# Patient Record
Sex: MALE | Race: WHITE | NOT HISPANIC OR LATINO | Employment: OTHER | URBAN - METROPOLITAN AREA
[De-identification: names, ages, dates, MRNs, and addresses within clinical notes are randomized per-mention and may not be internally consistent; named-entity substitution may affect disease eponyms.]

---

## 2018-01-03 ENCOUNTER — OFFICE VISIT CONVERTED (OUTPATIENT)
Dept: SURGERY | Facility: CLINIC | Age: 63
End: 2018-01-03
Attending: SURGERY

## 2018-12-12 ENCOUNTER — OFFICE VISIT CONVERTED (OUTPATIENT)
Dept: CARDIOLOGY | Facility: CLINIC | Age: 63
End: 2018-12-12
Attending: INTERNAL MEDICINE

## 2018-12-12 ENCOUNTER — CONVERSION ENCOUNTER (OUTPATIENT)
Dept: CARDIOLOGY | Facility: CLINIC | Age: 63
End: 2018-12-12

## 2018-12-17 ENCOUNTER — CONVERSION ENCOUNTER (OUTPATIENT)
Dept: CARDIOLOGY | Facility: CLINIC | Age: 63
End: 2018-12-17
Attending: INTERNAL MEDICINE

## 2018-12-24 RX ORDER — FUROSEMIDE 20 MG/1
20 TABLET ORAL EVERY OTHER DAY
COMMUNITY
End: 2019-01-02 | Stop reason: HOSPADM

## 2018-12-24 RX ORDER — ATORVASTATIN CALCIUM 40 MG/1
40 TABLET, FILM COATED ORAL DAILY
COMMUNITY
End: 2021-12-08 | Stop reason: SDUPTHER

## 2018-12-26 ENCOUNTER — PREP FOR SURGERY (OUTPATIENT)
Dept: OTHER | Facility: HOSPITAL | Age: 63
End: 2018-12-26

## 2018-12-26 ENCOUNTER — TELEPHONE (OUTPATIENT)
Dept: CARDIAC SURGERY | Facility: CLINIC | Age: 63
End: 2018-12-26

## 2018-12-26 ENCOUNTER — OFFICE VISIT (OUTPATIENT)
Dept: CARDIAC SURGERY | Facility: CLINIC | Age: 63
End: 2018-12-26

## 2018-12-26 ENCOUNTER — APPOINTMENT (OUTPATIENT)
Dept: CARDIOLOGY | Facility: HOSPITAL | Age: 63
End: 2018-12-26

## 2018-12-26 ENCOUNTER — HOSPITAL ENCOUNTER (INPATIENT)
Facility: HOSPITAL | Age: 63
LOS: 7 days | Discharge: HOME HEALTH | End: 2019-01-02
Attending: THORACIC SURGERY (CARDIOTHORACIC VASCULAR SURGERY) | Admitting: THORACIC SURGERY (CARDIOTHORACIC VASCULAR SURGERY)

## 2018-12-26 ENCOUNTER — APPOINTMENT (OUTPATIENT)
Dept: GENERAL RADIOLOGY | Facility: HOSPITAL | Age: 63
End: 2018-12-26

## 2018-12-26 ENCOUNTER — HOSPITAL ENCOUNTER (OUTPATIENT)
Facility: HOSPITAL | Age: 63
Setting detail: SURGERY ADMIT
End: 2018-12-26
Attending: THORACIC SURGERY (CARDIOTHORACIC VASCULAR SURGERY) | Admitting: THORACIC SURGERY (CARDIOTHORACIC VASCULAR SURGERY)

## 2018-12-26 VITALS
BODY MASS INDEX: 28.1 KG/M2 | DIASTOLIC BLOOD PRESSURE: 84 MMHG | OXYGEN SATURATION: 99 % | TEMPERATURE: 97.6 F | WEIGHT: 212 LBS | RESPIRATION RATE: 20 BRPM | HEIGHT: 73 IN | SYSTOLIC BLOOD PRESSURE: 150 MMHG | HEART RATE: 56 BPM

## 2018-12-26 DIAGNOSIS — I34.0 MITRAL VALVE INSUFFICIENCY, UNSPECIFIED ETIOLOGY: Primary | ICD-10-CM

## 2018-12-26 DIAGNOSIS — I34.0 NON-RHEUMATIC MITRAL REGURGITATION: Primary | ICD-10-CM

## 2018-12-26 DIAGNOSIS — I50.43 CHF (CONGESTIVE HEART FAILURE), NYHA CLASS III, ACUTE ON CHRONIC, COMBINED (HCC): ICD-10-CM

## 2018-12-26 DIAGNOSIS — Z98.890 S/P MVR (MITRAL VALVE REPAIR): ICD-10-CM

## 2018-12-26 DIAGNOSIS — R53.1 GENERALIZED WEAKNESS: Primary | ICD-10-CM

## 2018-12-26 DIAGNOSIS — I34.0 MITRAL VALVE INSUFFICIENCY, UNSPECIFIED ETIOLOGY: ICD-10-CM

## 2018-12-26 DIAGNOSIS — Z95.1 S/P CABG (CORONARY ARTERY BYPASS GRAFT): ICD-10-CM

## 2018-12-26 DIAGNOSIS — N18.30 CKD (CHRONIC KIDNEY DISEASE) STAGE 3, GFR 30-59 ML/MIN (HCC): ICD-10-CM

## 2018-12-26 DIAGNOSIS — I25.10 CORONARY ARTERY DISEASE INVOLVING NATIVE CORONARY ARTERY OF NATIVE HEART WITHOUT ANGINA PECTORIS: ICD-10-CM

## 2018-12-26 LAB
ABO GROUP BLD: NORMAL
ABO GROUP BLD: NORMAL
ALBUMIN SERPL-MCNC: 3.5 G/DL (ref 3.5–5.2)
ALBUMIN/GLOB SERPL: 1 G/DL
ALP SERPL-CCNC: 108 U/L (ref 39–117)
ALT SERPL W P-5'-P-CCNC: 18 U/L (ref 1–41)
ANION GAP SERPL CALCULATED.3IONS-SCNC: 10.8 MMOL/L
APTT PPP: 32.2 SECONDS (ref 22.7–35.4)
ARTERIAL PATENCY WRIST A: POSITIVE
AST SERPL-CCNC: 22 U/L (ref 1–40)
ATMOSPHERIC PRESS: 761.9 MMHG
BASE EXCESS BLDA CALC-SCNC: 4.1 MMOL/L (ref 0–2)
BASOPHILS # BLD AUTO: 0.04 10*3/MM3 (ref 0–0.2)
BASOPHILS NFR BLD AUTO: 0.4 % (ref 0–1.5)
BDY SITE: ABNORMAL
BH CV XLRA MEAS - DIST GSV CALF DIST LEFT: 0.25 CM
BH CV XLRA MEAS - DIST GSV CALF DIST RIGHT: 0.21 CM
BH CV XLRA MEAS - DIST GSV THIGH DIST LEFT: 0.34 CM
BH CV XLRA MEAS - DIST GSV THIGH DIST RIGHT: 0.33 CM
BH CV XLRA MEAS - GSV ANKLE DIST LEFT: 0.24 CM
BH CV XLRA MEAS - GSV ANKLE DIST RIGHT: 0.26 CM
BH CV XLRA MEAS - GSV KNEE DIST LEFT: 0.32 CM
BH CV XLRA MEAS - GSV KNEE DIST RIGHT: 0.33 CM
BH CV XLRA MEAS - GSV ORIGIN DIST LEFT: 0.63 CM
BH CV XLRA MEAS - GSV ORIGIN DIST RIGHT: 0.77 CM
BH CV XLRA MEAS - MID GSV CALF LEFT: 0.16 CM
BH CV XLRA MEAS - MID GSV CALF RIGHT: 0.19 CM
BH CV XLRA MEAS - MID GSV THIGH  LEFT: 0.37 CM
BH CV XLRA MEAS - MID GSV THIGH  RIGHT: 0.3 CM
BH CV XLRA MEAS - PROX GSV CALF DIST LEFT: 0.35 CM
BH CV XLRA MEAS - PROX GSV CALF DIST RIGHT: 0.19 CM
BH CV XLRA MEAS - PROX GSV THIGH  LEFT: 0.35 CM
BH CV XLRA MEAS - PROX GSV THIGH  RIGHT: 0.28 CM
BH CV XLRA MEAS LEFT DIST CCA EDV: -27.6 CM/SEC
BH CV XLRA MEAS LEFT DIST CCA PSV: -87.9 CM/SEC
BH CV XLRA MEAS LEFT DIST ICA EDV: -30.5 CM/SEC
BH CV XLRA MEAS LEFT DIST ICA PSV: -78 CM/SEC
BH CV XLRA MEAS LEFT ICA/CCA RATIO: 0.9
BH CV XLRA MEAS LEFT MID ICA EDV: -31.7 CM/SEC
BH CV XLRA MEAS LEFT MID ICA PSV: -86.8 CM/SEC
BH CV XLRA MEAS LEFT PROX CCA EDV: 22.9 CM/SEC
BH CV XLRA MEAS LEFT PROX CCA PSV: 89.1 CM/SEC
BH CV XLRA MEAS LEFT PROX ECA EDV: -9.4 CM/SEC
BH CV XLRA MEAS LEFT PROX ECA PSV: -52.6 CM/SEC
BH CV XLRA MEAS LEFT PROX ICA EDV: -27.1 CM/SEC
BH CV XLRA MEAS LEFT PROX ICA PSV: -58.9 CM/SEC
BH CV XLRA MEAS LEFT PROX SCLA PSV: 101 CM/SEC
BH CV XLRA MEAS LEFT VERTEBRAL A EDV: 14.9 CM/SEC
BH CV XLRA MEAS LEFT VERTEBRAL A PSV: 41.2 CM/SEC
BH CV XLRA MEAS RIGHT DIST CCA EDV: -31.7 CM/SEC
BH CV XLRA MEAS RIGHT DIST CCA PSV: -93.2 CM/SEC
BH CV XLRA MEAS RIGHT DIST ICA EDV: -31.3 CM/SEC
BH CV XLRA MEAS RIGHT DIST ICA PSV: -67.8 CM/SEC
BH CV XLRA MEAS RIGHT ICA/CCA RATIO: 0.7
BH CV XLRA MEAS RIGHT MID ICA EDV: -23.9 CM/SEC
BH CV XLRA MEAS RIGHT MID ICA PSV: -59 CM/SEC
BH CV XLRA MEAS RIGHT PROX CCA EDV: 19.9 CM/SEC
BH CV XLRA MEAS RIGHT PROX CCA PSV: 86.8 CM/SEC
BH CV XLRA MEAS RIGHT PROX ECA EDV: -14.7 CM/SEC
BH CV XLRA MEAS RIGHT PROX ECA PSV: -73.9 CM/SEC
BH CV XLRA MEAS RIGHT PROX ICA EDV: -19.6 CM/SEC
BH CV XLRA MEAS RIGHT PROX ICA PSV: -48.7 CM/SEC
BH CV XLRA MEAS RIGHT PROX SCLA PSV: 109 CM/SEC
BH CV XLRA MEAS RIGHT VERTEBRAL A EDV: 18.5 CM/SEC
BH CV XLRA MEAS RIGHT VERTEBRAL A PSV: 45.6 CM/SEC
BILIRUB SERPL-MCNC: 0.4 MG/DL (ref 0.1–1.2)
BILIRUB UR QL STRIP: NEGATIVE
BLD GP AB SCN SERPL QL: NEGATIVE
BUN BLD-MCNC: 24 MG/DL (ref 8–23)
BUN/CREAT SERPL: 18.5 (ref 7–25)
CALCIUM SPEC-SCNC: 9.1 MG/DL (ref 8.6–10.5)
CHLORIDE SERPL-SCNC: 107 MMOL/L (ref 98–107)
CHOLEST SERPL-MCNC: 108 MG/DL (ref 0–200)
CLARITY UR: CLEAR
CLOSE TME COLL+ADP + EPINEP PNL BLD: 99 % (ref 86–100)
CO2 SERPL-SCNC: 23.2 MMOL/L (ref 22–29)
COLOR UR: YELLOW
CREAT BLD-MCNC: 1.3 MG/DL (ref 0.76–1.27)
DEPRECATED RDW RBC AUTO: 41.7 FL (ref 37–54)
EOSINOPHIL # BLD AUTO: 0.36 10*3/MM3 (ref 0–0.7)
EOSINOPHIL NFR BLD AUTO: 3.7 % (ref 0.3–6.2)
ERYTHROCYTE [DISTWIDTH] IN BLOOD BY AUTOMATED COUNT: 12.9 % (ref 11.5–14.5)
GFR SERPL CREATININE-BSD FRML MDRD: 56 ML/MIN/1.73
GLOBULIN UR ELPH-MCNC: 3.4 GM/DL
GLUCOSE BLD-MCNC: 86 MG/DL (ref 65–99)
GLUCOSE UR STRIP-MCNC: NEGATIVE MG/DL
HBA1C MFR BLD: 4.9 % (ref 4.8–5.6)
HCO3 BLDA-SCNC: 28.3 MMOL/L (ref 22–28)
HCT VFR BLD AUTO: 40.2 % (ref 40.4–52.2)
HDLC SERPL-MCNC: 32 MG/DL (ref 40–60)
HGB BLD-MCNC: 13.8 G/DL (ref 13.7–17.6)
HGB UR QL STRIP.AUTO: NEGATIVE
IMM GRANULOCYTES # BLD AUTO: 0.03 10*3/MM3 (ref 0–0.03)
IMM GRANULOCYTES NFR BLD AUTO: 0.3 % (ref 0–0.5)
INR PPP: 1.08 (ref 0.9–1.1)
KETONES UR QL STRIP: NEGATIVE
LDLC SERPL CALC-MCNC: 46 MG/DL (ref 0–100)
LDLC/HDLC SERPL: 1.44 {RATIO}
LEFT ARM BP: NORMAL MMHG
LEUKOCYTE ESTERASE UR QL STRIP.AUTO: NEGATIVE
LYMPHOCYTES # BLD AUTO: 2.86 10*3/MM3 (ref 0.9–4.8)
LYMPHOCYTES NFR BLD AUTO: 29.5 % (ref 19.6–45.3)
MAGNESIUM SERPL-MCNC: 2.2 MG/DL (ref 1.6–2.4)
MCH RBC QN AUTO: 29.9 PG (ref 27–32.7)
MCHC RBC AUTO-ENTMCNC: 34.3 G/DL (ref 32.6–36.4)
MCV RBC AUTO: 87 FL (ref 79.8–96.2)
MODALITY: ABNORMAL
MONOCYTES # BLD AUTO: 0.94 10*3/MM3 (ref 0.2–1.2)
MONOCYTES NFR BLD AUTO: 9.7 % (ref 5–12)
NEUTROPHILS # BLD AUTO: 5.5 10*3/MM3 (ref 1.9–8.1)
NEUTROPHILS NFR BLD AUTO: 56.7 % (ref 42.7–76)
NITRITE UR QL STRIP: NEGATIVE
NT-PROBNP SERPL-MCNC: 208.9 PG/ML (ref 5–900)
PCO2 BLDA: 40 MM HG (ref 35–45)
PH BLDA: 7.46 PH UNITS (ref 7.35–7.45)
PH UR STRIP.AUTO: 6.5 [PH] (ref 5–8)
PLATELET # BLD AUTO: 237 10*3/MM3 (ref 140–500)
PMV BLD AUTO: 10.2 FL (ref 6–12)
PO2 BLDA: 74.9 MM HG (ref 80–100)
POTASSIUM BLD-SCNC: 3.8 MMOL/L (ref 3.5–5.2)
PROT SERPL-MCNC: 6.9 G/DL (ref 6–8.5)
PROT UR QL STRIP: NEGATIVE
PROTHROMBIN TIME: 13.8 SECONDS (ref 11.7–14.2)
RBC # BLD AUTO: 4.62 10*6/MM3 (ref 4.6–6)
RH BLD: POSITIVE
RH BLD: POSITIVE
RIGHT ARM BP: NORMAL MMHG
SAO2 % BLDCOA: 95.6 % (ref 92–99)
SODIUM BLD-SCNC: 141 MMOL/L (ref 136–145)
SP GR UR STRIP: 1.02 (ref 1–1.03)
T&S EXPIRATION DATE: NORMAL
TOTAL RATE: 18 BREATHS/MINUTE
TRIGL SERPL-MCNC: 150 MG/DL (ref 0–150)
UROBILINOGEN UR QL STRIP: NORMAL
VLDLC SERPL-MCNC: 30 MG/DL (ref 5–40)
WBC NRBC COR # BLD: 9.7 10*3/MM3 (ref 4.5–10.7)

## 2018-12-26 PROCEDURE — 85730 THROMBOPLASTIN TIME PARTIAL: CPT | Performed by: THORACIC SURGERY (CARDIOTHORACIC VASCULAR SURGERY)

## 2018-12-26 PROCEDURE — 80061 LIPID PANEL: CPT | Performed by: THORACIC SURGERY (CARDIOTHORACIC VASCULAR SURGERY)

## 2018-12-26 PROCEDURE — 80053 COMPREHEN METABOLIC PANEL: CPT | Performed by: THORACIC SURGERY (CARDIOTHORACIC VASCULAR SURGERY)

## 2018-12-26 PROCEDURE — 93005 ELECTROCARDIOGRAM TRACING: CPT | Performed by: THORACIC SURGERY (CARDIOTHORACIC VASCULAR SURGERY)

## 2018-12-26 PROCEDURE — 71046 X-RAY EXAM CHEST 2 VIEWS: CPT

## 2018-12-26 PROCEDURE — 85610 PROTHROMBIN TIME: CPT | Performed by: THORACIC SURGERY (CARDIOTHORACIC VASCULAR SURGERY)

## 2018-12-26 PROCEDURE — 99205 OFFICE O/P NEW HI 60 MIN: CPT | Performed by: THORACIC SURGERY (CARDIOTHORACIC VASCULAR SURGERY)

## 2018-12-26 PROCEDURE — 93010 ELECTROCARDIOGRAM REPORT: CPT | Performed by: INTERNAL MEDICINE

## 2018-12-26 PROCEDURE — 85576 BLOOD PLATELET AGGREGATION: CPT | Performed by: THORACIC SURGERY (CARDIOTHORACIC VASCULAR SURGERY)

## 2018-12-26 PROCEDURE — 93970 EXTREMITY STUDY: CPT

## 2018-12-26 PROCEDURE — 83036 HEMOGLOBIN GLYCOSYLATED A1C: CPT | Performed by: THORACIC SURGERY (CARDIOTHORACIC VASCULAR SURGERY)

## 2018-12-26 PROCEDURE — 82803 BLOOD GASES ANY COMBINATION: CPT

## 2018-12-26 PROCEDURE — 36600 WITHDRAWAL OF ARTERIAL BLOOD: CPT

## 2018-12-26 PROCEDURE — 86901 BLOOD TYPING SEROLOGIC RH(D): CPT | Performed by: THORACIC SURGERY (CARDIOTHORACIC VASCULAR SURGERY)

## 2018-12-26 PROCEDURE — 86901 BLOOD TYPING SEROLOGIC RH(D): CPT

## 2018-12-26 PROCEDURE — 81003 URINALYSIS AUTO W/O SCOPE: CPT | Performed by: THORACIC SURGERY (CARDIOTHORACIC VASCULAR SURGERY)

## 2018-12-26 PROCEDURE — 85025 COMPLETE CBC W/AUTO DIFF WBC: CPT | Performed by: THORACIC SURGERY (CARDIOTHORACIC VASCULAR SURGERY)

## 2018-12-26 PROCEDURE — 86920 COMPATIBILITY TEST SPIN: CPT

## 2018-12-26 PROCEDURE — 83735 ASSAY OF MAGNESIUM: CPT | Performed by: THORACIC SURGERY (CARDIOTHORACIC VASCULAR SURGERY)

## 2018-12-26 PROCEDURE — 86850 RBC ANTIBODY SCREEN: CPT | Performed by: THORACIC SURGERY (CARDIOTHORACIC VASCULAR SURGERY)

## 2018-12-26 PROCEDURE — 86900 BLOOD TYPING SEROLOGIC ABO: CPT | Performed by: THORACIC SURGERY (CARDIOTHORACIC VASCULAR SURGERY)

## 2018-12-26 PROCEDURE — 93880 EXTRACRANIAL BILAT STUDY: CPT

## 2018-12-26 PROCEDURE — 86900 BLOOD TYPING SEROLOGIC ABO: CPT

## 2018-12-26 PROCEDURE — 83880 ASSAY OF NATRIURETIC PEPTIDE: CPT | Performed by: THORACIC SURGERY (CARDIOTHORACIC VASCULAR SURGERY)

## 2018-12-26 RX ORDER — NITROGLYCERIN 0.4 MG/1
0.4 TABLET SUBLINGUAL
Status: CANCELLED | OUTPATIENT
Start: 2018-12-26

## 2018-12-26 RX ORDER — NITROGLYCERIN 0.4 MG/1
0.4 TABLET SUBLINGUAL
Status: DISCONTINUED | OUTPATIENT
Start: 2018-12-26 | End: 2018-12-27

## 2018-12-26 RX ORDER — ACETAMINOPHEN 325 MG/1
650 TABLET ORAL EVERY 4 HOURS PRN
Status: CANCELLED | OUTPATIENT
Start: 2018-12-26

## 2018-12-26 RX ORDER — CHLORHEXIDINE GLUCONATE 500 MG/1
1 CLOTH TOPICAL EVERY 12 HOURS
Status: CANCELLED | OUTPATIENT
Start: 2018-12-26 | End: 2018-12-27

## 2018-12-26 RX ORDER — TEMAZEPAM 15 MG/1
15 CAPSULE ORAL NIGHTLY PRN
Status: DISCONTINUED | OUTPATIENT
Start: 2018-12-26 | End: 2018-12-27

## 2018-12-26 RX ORDER — DIPHENHYDRAMINE HCL 25 MG
25 CAPSULE ORAL NIGHTLY PRN
Status: DISCONTINUED | OUTPATIENT
Start: 2018-12-26 | End: 2018-12-27

## 2018-12-26 RX ORDER — ALPRAZOLAM 0.25 MG/1
0.25 TABLET ORAL EVERY 8 HOURS PRN
Status: CANCELLED | OUTPATIENT
Start: 2018-12-26 | End: 2019-01-05

## 2018-12-26 RX ORDER — DIPHENHYDRAMINE HCL 25 MG
25 CAPSULE ORAL NIGHTLY PRN
Status: CANCELLED | OUTPATIENT
Start: 2018-12-26

## 2018-12-26 RX ORDER — CHLORHEXIDINE GLUCONATE 0.12 MG/ML
15 RINSE ORAL EVERY 12 HOURS SCHEDULED
Status: CANCELLED | OUTPATIENT
Start: 2018-12-26 | End: 2018-12-27

## 2018-12-26 RX ORDER — ACETAMINOPHEN 325 MG/1
650 TABLET ORAL EVERY 4 HOURS PRN
Status: DISCONTINUED | OUTPATIENT
Start: 2018-12-26 | End: 2018-12-27

## 2018-12-26 RX ORDER — CHLORHEXIDINE GLUCONATE 0.12 MG/ML
15 RINSE ORAL ONCE
Status: DISCONTINUED | OUTPATIENT
Start: 2018-12-26 | End: 2018-12-27 | Stop reason: HOSPADM

## 2018-12-26 RX ORDER — ALPRAZOLAM 0.25 MG/1
0.25 TABLET ORAL EVERY 8 HOURS PRN
Status: DISCONTINUED | OUTPATIENT
Start: 2018-12-26 | End: 2018-12-27

## 2018-12-26 RX ORDER — CHLORHEXIDINE GLUCONATE 500 MG/1
1 CLOTH TOPICAL EVERY 12 HOURS
Status: DISCONTINUED | OUTPATIENT
Start: 2018-12-26 | End: 2018-12-27

## 2018-12-26 RX ORDER — TEMAZEPAM 15 MG/1
15 CAPSULE ORAL NIGHTLY PRN
Status: CANCELLED | OUTPATIENT
Start: 2018-12-26 | End: 2019-01-05

## 2018-12-26 RX ORDER — CHLORHEXIDINE GLUCONATE 0.12 MG/ML
15 RINSE ORAL EVERY 12 HOURS SCHEDULED
Status: DISCONTINUED | OUTPATIENT
Start: 2018-12-26 | End: 2018-12-27

## 2018-12-26 RX ORDER — CHLORHEXIDINE GLUCONATE 500 MG/1
1 CLOTH TOPICAL EVERY 12 HOURS PRN
Status: DISCONTINUED | OUTPATIENT
Start: 2018-12-26 | End: 2018-12-27 | Stop reason: HOSPADM

## 2018-12-26 RX ADMIN — MUPIROCIN 1 APPLICATION: 20 OINTMENT TOPICAL at 21:49

## 2018-12-26 RX ADMIN — CHLORHEXIDINE GLUCONATE 15 ML: 1.2 RINSE ORAL at 21:48

## 2018-12-26 RX ADMIN — TEMAZEPAM 15 MG: 15 CAPSULE ORAL at 23:32

## 2018-12-26 NOTE — TELEPHONE ENCOUNTER
S/w Elizabeth in surgery scheduling to add CABG/MVR @ 0789 on 12/27/2018 for patient to [facility] schedule per Karin MAYA

## 2018-12-26 NOTE — PROGRESS NOTES
12/26/2018      Subjective:      Erick Jessica MD    Chief Complaint: Shortness of air and fatigue    History of Present Illness:       Dear Erick Ramirez MD and Colleagues,  It was nice to see Jw Read in consultation at your request. He is a 63 y.o. male with a history of mitral valve prolapse and mitral incompetence with single-vessel coronary disease who has developed class III symptoms of shortness of air. He denies angina. The ECHO that I reviewed personally shows severe mitral incompetence with a flail posterior leaflet.  There is a dilated LV with systolic function at 65% ejection fraction.  There is trace aortic incompetence with some aortic sclerosis.  The tricuspid is satisfactory. The Cardiac Cath that I reviewed personally shows an ostial lesion of the first diagonal branch of the LAD at about 60%.  The remainder the coronaries are normal.  There is mitral incompetence.    I reviewed a report of the CAT scan done that shows bilateral pleural effusions and fibrocalcific changes with granulomas that are small and warrants only follow-up.    His symptoms came on suddenly and have gotten worse over the last couple months.  He is a retired  and works on his farm and they noticed the symptoms while they're on a cruise.  He will be admitted today for surgery tomorrow.    His creatinine is 1.3 and he is not seen a nephrologist.  This is mildly reduced GFR and we'll recheck this today..    Patient Active Problem List   Diagnosis   • Non-rheumatic mitral regurgitation   • CKD (chronic kidney disease) stage 3, GFR 30-59 ml/min (CMS/HCC)   • Coronary artery disease involving native coronary artery of native heart without angina pectoris   • CHF (congestive heart failure), NYHA class III, acute on chronic, combined (CMS/HCC)       Past Medical History:   Diagnosis Date   • Chest pain due to CAD 12/2018   • CKD (chronic kidney disease), stage III (CMS/HCC)    • MCINTOSH (dyspnea on  exertion)    • Heart murmur    • Hyperlipidemia     Mild   • Hypertension    • Left atrial enlargement 12/24/2018    Noted on REINALDO   • Mild aortic valve regurgitation 12/24/2018    Noted on REINALDO   • Mitral valve prolapse 12/24/2018    Moderate to Severe Noted on REINALDO   • Mitral valve regurgitation 12/21/18-[facility]    Severe Noted on Cardiac Cath & REINALDO-12/24/18   • SOB (shortness of breath) 12/2018       Past Surgical History:   Procedure Laterality Date   • CARDIAC CATHETERIZATION Bilateral 12/21/2018-[facility]    w/L Ventriculography/Coronary Angiography--Single-Vessel CAD Noted w/Severe MVR   • CHOLECYSTECTOMY     • REINALDO  12/24/18-[facility]    Moderate-Severe MV Prolapse; Mild Calcific Change; L Atrial Enlargement; EF 65%; Mild AVR Noted; Severe MVR Noted       Allergies   Allergen Reactions   • Penicillins Unknown (See Comments)     Unknown           Current Outpatient Medications:   •  atorvastatin (LIPITOR) 40 MG tablet, Take 40 mg by mouth Daily., Disp: , Rfl:   •  furosemide (LASIX) 20 MG tablet, Take 20 mg by mouth Every Other Day., Disp: , Rfl:     Social History     Socioeconomic History   • Marital status:      Spouse name: Not on file   • Number of children: Not on file   • Years of education: Not on file   • Highest education level: Not on file   Social Needs   • Financial resource strain: Not on file   • Food insecurity - worry: Not on file   • Food insecurity - inability: Not on file   • Transportation needs - medical: Not on file   • Transportation needs - non-medical: Not on file   Occupational History   • Not on file   Tobacco Use   • Smoking status: Not on file   • Smokeless tobacco: Never Used   Substance and Sexual Activity   • Alcohol use: Not on file   • Drug use: No   • Sexual activity: Defer   Other Topics Concern   • Not on file   Social History Narrative   • Not on file       Family History   Problem Relation Age of Onset   • Valvular heart disease Father         In the 1970's           Review of  Systems:  Review of Systems   Constitutional: Positive for activity change and fatigue.   HENT: Negative.    Eyes: Negative.    Respiratory: Positive for cough, chest tightness and shortness of breath.    Cardiovascular: Negative.    Endocrine: Negative.    Genitourinary: Negative.    Musculoskeletal: Negative.    Skin: Negative.    Allergic/Immunologic: Negative.    Neurological: Negative.    Hematological: Negative.    Psychiatric/Behavioral: Negative.      Cardiovascular ROS: positive for - dyspnea on exertion, paroxysmal nocturnal dyspnea and shortness of breath  Physical Exam:    Vital Signs:  Weight: 96.2 kg (212 lb)   Body mass index is 27.97 kg/m².  Temp: 97.6 °F (36.4 °C)   Heart Rate: 56   BP: 150/84     Physical Exam   Constitutional: He is oriented to person, place, and time. He appears well-developed and well-nourished. No distress.   HENT:   Head: Normocephalic and atraumatic.   Right Ear: External ear normal.   Left Ear: External ear normal.   Nose: Nose normal.   Mouth/Throat: Oropharynx is clear and moist.   Eyes: Conjunctivae and EOM are normal. Pupils are equal, round, and reactive to light. Right eye exhibits no discharge. Left eye exhibits no discharge. No scleral icterus.   Neck: Normal range of motion. Neck supple. No JVD present. No tracheal deviation present. No thyromegaly present.   Cardiovascular: Normal rate, regular rhythm, intact distal pulses and normal pulses. PMI is displaced. Exam reveals no gallop and no friction rub.   Murmur heard.  High-pitched blowing holosystolic murmur is present with a grade of 4/6 at the apex radiating to the neck.  Pulses:       Carotid pulses are 2+ on the right side, and 2+ on the left side.       Radial pulses are 2+ on the right side, and 2+ on the left side.        Femoral pulses are 2+ on the right side, and 2+ on the left side.       Popliteal pulses are 2+ on the right side, and 2+ on the left side.        Dorsalis pedis pulses are 2+ on the right  side, and 2+ on the left side.        Posterior tibial pulses are 2+ on the right side, and 2+ on the left side.   Pulmonary/Chest: Effort normal and breath sounds normal. No stridor. No respiratory distress. He has no wheezes. He has no rales. He exhibits no tenderness.   Abdominal: Soft. Bowel sounds are normal. He exhibits no distension and no mass. There is no tenderness. There is no rebound and no guarding.   Musculoskeletal: Normal range of motion. He exhibits no edema, tenderness or deformity.   Lymphadenopathy:     He has no cervical adenopathy.   Neurological: He is alert and oriented to person, place, and time. He has normal reflexes. He displays normal reflexes. No cranial nerve deficit. He exhibits normal muscle tone. Coordination normal.   Skin: Skin is warm and dry. No rash noted. He is not diaphoretic. No erythema. No pallor.   Psychiatric: He has a normal mood and affect. His behavior is normal. Judgment and thought content normal.                  Recommendation/Plan:        he has developed class III symptoms of congestive heart failure the came on rather suddenly from a torn flail leaflet of the posterior leaflet of the mitral valve in a valve that has myxomatous changes.  He has an ostial diagonal lesion that will need a bypass.  Operation is advisable prolong life and relieve symptoms.  I think there is a 95% chance that this valve can be repaired either with resection of the flail leaflet or resuspension with Spencer-Luis Alberto tammie-cords.  He has a CT scan that shows granulomas and I think these can be followed up in 3-6 months with another CAT scan.  He has mild chronic kidney disease with mild elevation of his creatinine and a decrease in GFR and will follow-up with this in the hospital.    He'll be admitted today for surgery tomorrow.  I went through all the risks options and alternatives with the patient and his wife who is a nurse.          Thank you for allowing me to participate in his  care.    Regards,    Miguel Lobo MD

## 2018-12-26 NOTE — TELEPHONE ENCOUNTER
Patient is an office admit for chest pain, CAD and Severe Mitral Insuffiency. S/W Ita at [facility] department. Inpatient auth covers all services done while admitted including any surgical procedures per [facility]. Pending Ref # [id number]  Pending receipt of clinicals to fax # [phone]

## 2018-12-27 ENCOUNTER — ANESTHESIA EVENT (OUTPATIENT)
Dept: PERIOP | Facility: HOSPITAL | Age: 63
End: 2018-12-27

## 2018-12-27 ENCOUNTER — APPOINTMENT (OUTPATIENT)
Dept: GENERAL RADIOLOGY | Facility: HOSPITAL | Age: 63
End: 2018-12-27

## 2018-12-27 ENCOUNTER — ANESTHESIA (OUTPATIENT)
Dept: PERIOP | Facility: HOSPITAL | Age: 63
End: 2018-12-27

## 2018-12-27 ENCOUNTER — ANCILLARY PROCEDURE (OUTPATIENT)
Dept: PERIOP | Facility: HOSPITAL | Age: 63
End: 2018-12-27
Attending: ANESTHESIOLOGY

## 2018-12-27 LAB
ACT BLD: 103 SECONDS (ref 82–152)
ACT BLD: 136 SECONDS (ref 82–152)
ACT BLD: 296 SECONDS (ref 82–152)
ACT BLD: 318 SECONDS (ref 82–152)
ACT BLD: 422 SECONDS (ref 82–152)
ACT BLD: 461 SECONDS (ref 82–152)
ALBUMIN SERPL-MCNC: 3.5 G/DL (ref 3.5–5.2)
ALBUMIN SERPL-MCNC: 4.4 G/DL (ref 3.5–5.2)
ALBUMIN SERPL-MCNC: 4.4 G/DL (ref 3.5–5.2)
ANION GAP SERPL CALCULATED.3IONS-SCNC: 11.3 MMOL/L
ANION GAP SERPL CALCULATED.3IONS-SCNC: 11.9 MMOL/L
ANION GAP SERPL CALCULATED.3IONS-SCNC: 8.8 MMOL/L
ANION GAP SERPL CALCULATED.3IONS-SCNC: 9.6 MMOL/L
APTT PPP: 31.7 SECONDS (ref 22.7–35.4)
APTT PPP: 32.1 SECONDS (ref 22.7–35.4)
ARTERIAL PATENCY WRIST A: ABNORMAL
ARTERIAL PATENCY WRIST A: ABNORMAL
ATMOSPHERIC PRESS: 751.2 MMHG
ATMOSPHERIC PRESS: 754.4 MMHG
BASE EXCESS BLDA CALC-SCNC: -1 MMOL/L (ref -5–5)
BASE EXCESS BLDA CALC-SCNC: -1.1 MMOL/L (ref 0–2)
BASE EXCESS BLDA CALC-SCNC: -1.8 MMOL/L (ref 0–2)
BASE EXCESS BLDA CALC-SCNC: -2 MMOL/L (ref -5–5)
BASE EXCESS BLDA CALC-SCNC: -3 MMOL/L (ref -5–5)
BASE EXCESS BLDA CALC-SCNC: 0 MMOL/L (ref -5–5)
BASE EXCESS BLDA CALC-SCNC: 1 MMOL/L (ref -5–5)
BASOPHILS # BLD AUTO: 0.01 10*3/MM3 (ref 0–0.2)
BASOPHILS # BLD AUTO: 0.04 10*3/MM3 (ref 0–0.2)
BASOPHILS NFR BLD AUTO: 0.1 % (ref 0–1.5)
BASOPHILS NFR BLD AUTO: 0.4 % (ref 0–1.5)
BDY SITE: ABNORMAL
BDY SITE: ABNORMAL
BUN BLD-MCNC: 17 MG/DL (ref 8–23)
BUN BLD-MCNC: 17 MG/DL (ref 8–23)
BUN BLD-MCNC: 18 MG/DL (ref 8–23)
BUN BLD-MCNC: 19 MG/DL (ref 8–23)
BUN/CREAT SERPL: 12.6 (ref 7–25)
BUN/CREAT SERPL: 13.1 (ref 7–25)
BUN/CREAT SERPL: 13.5 (ref 7–25)
BUN/CREAT SERPL: 15.9 (ref 7–25)
CA-I BLD-MCNC: 4.6 MG/DL (ref 4.6–5.4)
CA-I SERPL ISE-MCNC: 1.16 MMOL/L (ref 1.15–1.35)
CALCIUM SPEC-SCNC: 7.9 MG/DL (ref 8.6–10.5)
CALCIUM SPEC-SCNC: 8.2 MG/DL (ref 8.6–10.5)
CALCIUM SPEC-SCNC: 8.6 MG/DL (ref 8.6–10.5)
CALCIUM SPEC-SCNC: 9.3 MG/DL (ref 8.6–10.5)
CHLORIDE SERPL-SCNC: 107 MMOL/L (ref 98–107)
CHLORIDE SERPL-SCNC: 109 MMOL/L (ref 98–107)
CHLORIDE SERPL-SCNC: 110 MMOL/L (ref 98–107)
CHLORIDE SERPL-SCNC: 110 MMOL/L (ref 98–107)
CO2 BLDA-SCNC: 24 MMOL/L (ref 24–29)
CO2 BLDA-SCNC: 25 MMOL/L (ref 24–29)
CO2 BLDA-SCNC: 27 MMOL/L (ref 24–29)
CO2 BLDA-SCNC: 27 MMOL/L (ref 24–29)
CO2 BLDA-SCNC: 28 MMOL/L (ref 24–29)
CO2 SERPL-SCNC: 21.2 MMOL/L (ref 22–29)
CO2 SERPL-SCNC: 22.1 MMOL/L (ref 22–29)
CO2 SERPL-SCNC: 22.7 MMOL/L (ref 22–29)
CO2 SERPL-SCNC: 23.4 MMOL/L (ref 22–29)
CREAT BLD-MCNC: 1.13 MG/DL (ref 0.76–1.27)
CREAT BLD-MCNC: 1.26 MG/DL (ref 0.76–1.27)
CREAT BLD-MCNC: 1.35 MG/DL (ref 0.76–1.27)
CREAT BLD-MCNC: 1.45 MG/DL (ref 0.76–1.27)
DEPRECATED RDW RBC AUTO: 41.7 FL (ref 37–54)
DEPRECATED RDW RBC AUTO: 42 FL (ref 37–54)
DEPRECATED RDW RBC AUTO: 43.1 FL (ref 37–54)
EOSINOPHIL # BLD AUTO: 0.07 10*3/MM3 (ref 0–0.7)
EOSINOPHIL # BLD AUTO: 0.43 10*3/MM3 (ref 0–0.7)
EOSINOPHIL NFR BLD AUTO: 0.4 % (ref 0.3–6.2)
EOSINOPHIL NFR BLD AUTO: 3.9 % (ref 0.3–6.2)
ERYTHROCYTE [DISTWIDTH] IN BLOOD BY AUTOMATED COUNT: 13 % (ref 11.5–14.5)
ERYTHROCYTE [DISTWIDTH] IN BLOOD BY AUTOMATED COUNT: 13.1 % (ref 11.5–14.5)
ERYTHROCYTE [DISTWIDTH] IN BLOOD BY AUTOMATED COUNT: 13.1 % (ref 11.5–14.5)
FIBRINOGEN PPP-MCNC: 302 MG/DL (ref 219–464)
GFR SERPL CREATININE-BSD FRML MDRD: 49 ML/MIN/1.73
GFR SERPL CREATININE-BSD FRML MDRD: 54 ML/MIN/1.73
GFR SERPL CREATININE-BSD FRML MDRD: 58 ML/MIN/1.73
GFR SERPL CREATININE-BSD FRML MDRD: 66 ML/MIN/1.73
GLUCOSE BLD-MCNC: 138 MG/DL (ref 65–99)
GLUCOSE BLD-MCNC: 139 MG/DL (ref 65–99)
GLUCOSE BLD-MCNC: 158 MG/DL (ref 65–99)
GLUCOSE BLD-MCNC: 92 MG/DL (ref 65–99)
GLUCOSE BLDC GLUCOMTR-MCNC: 101 MG/DL (ref 70–130)
GLUCOSE BLDC GLUCOMTR-MCNC: 122 MG/DL (ref 70–130)
GLUCOSE BLDC GLUCOMTR-MCNC: 125 MG/DL (ref 70–130)
GLUCOSE BLDC GLUCOMTR-MCNC: 130 MG/DL (ref 70–130)
GLUCOSE BLDC GLUCOMTR-MCNC: 138 MG/DL (ref 70–130)
GLUCOSE BLDC GLUCOMTR-MCNC: 141 MG/DL (ref 70–130)
GLUCOSE BLDC GLUCOMTR-MCNC: 158 MG/DL (ref 70–130)
GLUCOSE BLDC GLUCOMTR-MCNC: 161 MG/DL (ref 70–130)
HCO3 BLDA-SCNC: 23 MMOL/L (ref 22–26)
HCO3 BLDA-SCNC: 23.8 MMOL/L (ref 22–26)
HCO3 BLDA-SCNC: 24.6 MMOL/L (ref 22–28)
HCO3 BLDA-SCNC: 24.8 MMOL/L (ref 22–28)
HCO3 BLDA-SCNC: 25.8 MMOL/L (ref 22–26)
HCO3 BLDA-SCNC: 25.9 MMOL/L (ref 22–26)
HCO3 BLDA-SCNC: 26.1 MMOL/L (ref 22–26)
HCT VFR BLD AUTO: 35.6 % (ref 40.4–52.2)
HCT VFR BLD AUTO: 39.4 % (ref 40.4–52.2)
HCT VFR BLD AUTO: 44.6 % (ref 40.4–52.2)
HCT VFR BLDA CALC: 29 % (ref 38–51)
HCT VFR BLDA CALC: 31 % (ref 38–51)
HCT VFR BLDA CALC: 31 % (ref 38–51)
HCT VFR BLDA CALC: 33 % (ref 38–51)
HCT VFR BLDA CALC: 39 % (ref 38–51)
HGB BLD-MCNC: 12 G/DL (ref 13.7–17.6)
HGB BLD-MCNC: 13.3 G/DL (ref 13.7–17.6)
HGB BLD-MCNC: 14.6 G/DL (ref 13.7–17.6)
HGB BLDA-MCNC: 10.5 G/DL (ref 12–17)
HGB BLDA-MCNC: 10.5 G/DL (ref 12–17)
HGB BLDA-MCNC: 11.2 G/DL (ref 12–17)
HGB BLDA-MCNC: 13.3 G/DL (ref 12–17)
HGB BLDA-MCNC: 9.9 G/DL (ref 12–17)
HOROWITZ INDEX BLD+IHG-RTO: 100 %
HOROWITZ INDEX BLD+IHG-RTO: 40 %
IMM GRANULOCYTES # BLD AUTO: 0.05 10*3/MM3 (ref 0–0.03)
IMM GRANULOCYTES # BLD AUTO: 0.2 10*3/MM3 (ref 0–0.03)
IMM GRANULOCYTES NFR BLD AUTO: 0.4 % (ref 0–0.5)
IMM GRANULOCYTES NFR BLD AUTO: 1.2 % (ref 0–0.5)
INR PPP: 1.03 (ref 0.9–1.1)
INR PPP: 1.41 (ref 0.9–1.1)
LYMPHOCYTES # BLD AUTO: 1.07 10*3/MM3 (ref 0.9–4.8)
LYMPHOCYTES # BLD AUTO: 3.27 10*3/MM3 (ref 0.9–4.8)
LYMPHOCYTES NFR BLD AUTO: 29.4 % (ref 19.6–45.3)
LYMPHOCYTES NFR BLD AUTO: 6.2 % (ref 19.6–45.3)
MAGNESIUM SERPL-MCNC: 2.5 MG/DL (ref 1.6–2.4)
MAGNESIUM SERPL-MCNC: 2.6 MG/DL (ref 1.6–2.4)
MAGNESIUM SERPL-MCNC: 2.8 MG/DL (ref 1.6–2.4)
MCH RBC QN AUTO: 29.3 PG (ref 27–32.7)
MCH RBC QN AUTO: 29.4 PG (ref 27–32.7)
MCH RBC QN AUTO: 29.9 PG (ref 27–32.7)
MCHC RBC AUTO-ENTMCNC: 32.7 G/DL (ref 32.6–36.4)
MCHC RBC AUTO-ENTMCNC: 33.7 G/DL (ref 32.6–36.4)
MCHC RBC AUTO-ENTMCNC: 33.8 G/DL (ref 32.6–36.4)
MCV RBC AUTO: 87 FL (ref 79.8–96.2)
MCV RBC AUTO: 87 FL (ref 79.8–96.2)
MCV RBC AUTO: 91.4 FL (ref 79.8–96.2)
MODALITY: ABNORMAL
MODALITY: ABNORMAL
MONOCYTES # BLD AUTO: 0.3 10*3/MM3 (ref 0.2–1.2)
MONOCYTES # BLD AUTO: 0.9 10*3/MM3 (ref 0.2–1.2)
MONOCYTES NFR BLD AUTO: 1.7 % (ref 5–12)
MONOCYTES NFR BLD AUTO: 8.1 % (ref 5–12)
NEUTROPHILS # BLD AUTO: 15.82 10*3/MM3 (ref 1.9–8.1)
NEUTROPHILS # BLD AUTO: 6.44 10*3/MM3 (ref 1.9–8.1)
NEUTROPHILS NFR BLD AUTO: 57.8 % (ref 42.7–76)
NEUTROPHILS NFR BLD AUTO: 91.6 % (ref 42.7–76)
O2 A-A PPRESDIFF RESPIRATORY: 0.3 MMHG
O2 A-A PPRESDIFF RESPIRATORY: 0.4 MMHG
PCO2 BLDA: 37.1 MM HG (ref 35–45)
PCO2 BLDA: 44.9 MM HG (ref 35–45)
PCO2 BLDA: 45.8 MM HG (ref 35–45)
PCO2 BLDA: 47.5 MM HG (ref 35–45)
PCO2 BLDA: 50.5 MM HG (ref 35–45)
PCO2 BLDA: 52.4 MM HG (ref 35–45)
PCO2 BLDA: 52.9 MM HG (ref 35–45)
PEEP RESPIRATORY: 7.5 CM[H2O]
PEEP RESPIRATORY: 7.5 CM[H2O]
PH BLDA: 7.26 PH UNITS (ref 7.35–7.6)
PH BLDA: 7.3 PH UNITS (ref 7.35–7.6)
PH BLDA: 7.32 PH UNITS (ref 7.35–7.45)
PH BLDA: 7.32 PH UNITS (ref 7.35–7.6)
PH BLDA: 7.35 PH UNITS (ref 7.35–7.45)
PH BLDA: 7.36 PH UNITS (ref 7.35–7.6)
PH BLDA: 7.4 PH UNITS (ref 7.35–7.6)
PHOSPHATE SERPL-MCNC: 1.4 MG/DL (ref 2.5–4.5)
PHOSPHATE SERPL-MCNC: 2.1 MG/DL (ref 2.5–4.5)
PHOSPHATE SERPL-MCNC: 3.2 MG/DL (ref 2.5–4.5)
PLATELET # BLD AUTO: 137 10*3/MM3 (ref 140–500)
PLATELET # BLD AUTO: 145 10*3/MM3 (ref 140–500)
PLATELET # BLD AUTO: 243 10*3/MM3 (ref 140–500)
PMV BLD AUTO: 10.1 FL (ref 6–12)
PMV BLD AUTO: 10.2 FL (ref 6–12)
PMV BLD AUTO: 9.9 FL (ref 6–12)
PO2 BLDA: 212.2 MM HG (ref 80–100)
PO2 BLDA: 423 MMHG (ref 80–105)
PO2 BLDA: 444 MMHG (ref 80–105)
PO2 BLDA: 451 MMHG (ref 80–105)
PO2 BLDA: 55 MMHG (ref 80–105)
PO2 BLDA: 82 MMHG (ref 80–105)
PO2 BLDA: 86.8 MM HG (ref 80–100)
POTASSIUM BLD-SCNC: 3.8 MMOL/L (ref 3.5–5.2)
POTASSIUM BLD-SCNC: 3.8 MMOL/L (ref 3.5–5.2)
POTASSIUM BLD-SCNC: 4.6 MMOL/L (ref 3.5–5.2)
POTASSIUM BLD-SCNC: 5 MMOL/L (ref 3.5–5.2)
POTASSIUM BLD-SCNC: 5.2 MMOL/L (ref 3.5–5.2)
POTASSIUM BLDA-SCNC: 3.6 MMOL/L (ref 3.5–4.9)
POTASSIUM BLDA-SCNC: 4 MMOL/L (ref 3.5–4.9)
POTASSIUM BLDA-SCNC: 4.6 MMOL/L (ref 3.5–4.9)
POTASSIUM BLDA-SCNC: 4.7 MMOL/L (ref 3.5–4.9)
POTASSIUM BLDA-SCNC: 4.9 MMOL/L (ref 3.5–4.9)
PROTHROMBIN TIME: 13.3 SECONDS (ref 11.7–14.2)
PROTHROMBIN TIME: 17 SECONDS (ref 11.7–14.2)
RBC # BLD AUTO: 4.09 10*6/MM3 (ref 4.6–6)
RBC # BLD AUTO: 4.53 10*6/MM3 (ref 4.6–6)
RBC # BLD AUTO: 4.88 10*6/MM3 (ref 4.6–6)
SAO2 % BLDA: 100 % (ref 95–98)
SAO2 % BLDA: 83 % (ref 95–98)
SAO2 % BLDA: 96 % (ref 95–98)
SAO2 % BLDCOA: 96 % (ref 92–99)
SAO2 % BLDCOA: 99.7 % (ref 92–99)
SET MECH RESP RATE: 12
SODIUM BLD-SCNC: 139 MMOL/L (ref 136–145)
SODIUM BLD-SCNC: 140 MMOL/L (ref 136–145)
SODIUM BLD-SCNC: 144 MMOL/L (ref 136–145)
SODIUM BLD-SCNC: 144 MMOL/L (ref 136–145)
TOTAL RATE: 13 BREATHS/MINUTE
TOTAL RATE: 24 BREATHS/MINUTE
VENTILATOR MODE: ABNORMAL
VENTILATOR MODE: ABNORMAL
VT ON VENT VENT: 750 ML
WBC NRBC COR # BLD: 11.13 10*3/MM3 (ref 4.5–10.7)
WBC NRBC COR # BLD: 17.27 10*3/MM3 (ref 4.5–10.7)
WBC NRBC COR # BLD: 21 10*3/MM3 (ref 4.5–10.7)

## 2018-12-27 PROCEDURE — 94799 UNLISTED PULMONARY SVC/PX: CPT

## 2018-12-27 PROCEDURE — 25010000002 PHENYLEPHRINE PER 1 ML: Performed by: ANESTHESIOLOGY

## 2018-12-27 PROCEDURE — 25010000002 PAPAVERINE PER 60 MG: Performed by: THORACIC SURGERY (CARDIOTHORACIC VASCULAR SURGERY)

## 2018-12-27 PROCEDURE — A4648 IMPLANTABLE TISSUE MARKER: HCPCS | Performed by: THORACIC SURGERY (CARDIOTHORACIC VASCULAR SURGERY)

## 2018-12-27 PROCEDURE — 88305 TISSUE EXAM BY PATHOLOGIST: CPT | Performed by: THORACIC SURGERY (CARDIOTHORACIC VASCULAR SURGERY)

## 2018-12-27 PROCEDURE — 85025 COMPLETE CBC W/AUTO DIFF WBC: CPT | Performed by: THORACIC SURGERY (CARDIOTHORACIC VASCULAR SURGERY)

## 2018-12-27 PROCEDURE — 63710000001 INSULIN LISPRO (HUMAN) PER 5 UNITS: Performed by: THORACIC SURGERY (CARDIOTHORACIC VASCULAR SURGERY)

## 2018-12-27 PROCEDURE — 93005 ELECTROCARDIOGRAM TRACING: CPT | Performed by: THORACIC SURGERY (CARDIOTHORACIC VASCULAR SURGERY)

## 2018-12-27 PROCEDURE — 82803 BLOOD GASES ANY COMBINATION: CPT

## 2018-12-27 PROCEDURE — 25010000002 MIDAZOLAM PER 1 MG: Performed by: ANESTHESIOLOGY

## 2018-12-27 PROCEDURE — 25010000002 MAGNESIUM SULFATE PER 500 MG OF MAGNESIUM: Performed by: ANESTHESIOLOGY

## 2018-12-27 PROCEDURE — P9041 ALBUMIN (HUMAN),5%, 50ML: HCPCS | Performed by: THORACIC SURGERY (CARDIOTHORACIC VASCULAR SURGERY)

## 2018-12-27 PROCEDURE — 25010000002 PROPOFOL 10 MG/ML EMULSION: Performed by: ANESTHESIOLOGY

## 2018-12-27 PROCEDURE — 25010000002 HEPARIN (PORCINE) PER 1000 UNITS: Performed by: ANESTHESIOLOGY

## 2018-12-27 PROCEDURE — 33510 CABG VEIN SINGLE: CPT | Performed by: SPECIALIST/TECHNOLOGIST, OTHER

## 2018-12-27 PROCEDURE — C1751 CATH, INF, PER/CENT/MIDLINE: HCPCS | Performed by: ANESTHESIOLOGY

## 2018-12-27 PROCEDURE — B246ZZ4 ULTRASONOGRAPHY OF RIGHT AND LEFT HEART, TRANSESOPHAGEAL: ICD-10-PCS | Performed by: THORACIC SURGERY (CARDIOTHORACIC VASCULAR SURGERY)

## 2018-12-27 PROCEDURE — C1729 CATH, DRAINAGE: HCPCS | Performed by: THORACIC SURGERY (CARDIOTHORACIC VASCULAR SURGERY)

## 2018-12-27 PROCEDURE — 85384 FIBRINOGEN ACTIVITY: CPT | Performed by: THORACIC SURGERY (CARDIOTHORACIC VASCULAR SURGERY)

## 2018-12-27 PROCEDURE — 25010000002 ONDANSETRON PER 1 MG: Performed by: ANESTHESIOLOGY

## 2018-12-27 PROCEDURE — 33427 REPAIR OF MITRAL VALVE: CPT | Performed by: THORACIC SURGERY (CARDIOTHORACIC VASCULAR SURGERY)

## 2018-12-27 PROCEDURE — 33508 ENDOSCOPIC VEIN HARVEST: CPT | Performed by: SPECIALIST/TECHNOLOGIST, OTHER

## 2018-12-27 PROCEDURE — 94002 VENT MGMT INPAT INIT DAY: CPT

## 2018-12-27 PROCEDURE — 82947 ASSAY GLUCOSE BLOOD QUANT: CPT

## 2018-12-27 PROCEDURE — 25010000002 ALBUMIN HUMAN 5% PER 50 ML: Performed by: THORACIC SURGERY (CARDIOTHORACIC VASCULAR SURGERY)

## 2018-12-27 PROCEDURE — 02UG0JZ SUPPLEMENT MITRAL VALVE WITH SYNTHETIC SUBSTITUTE, OPEN APPROACH: ICD-10-PCS | Performed by: THORACIC SURGERY (CARDIOTHORACIC VASCULAR SURGERY)

## 2018-12-27 PROCEDURE — 25010000002 VANCOMYCIN 1 G RECONSTITUTED SOLUTION: Performed by: ANESTHESIOLOGY

## 2018-12-27 PROCEDURE — 25010000003 PROTAMINE SULFATE PER 10 MG: Performed by: THORACIC SURGERY (CARDIOTHORACIC VASCULAR SURGERY)

## 2018-12-27 PROCEDURE — 25010000002 FUROSEMIDE PER 20 MG

## 2018-12-27 PROCEDURE — 85347 COAGULATION TIME ACTIVATED: CPT

## 2018-12-27 PROCEDURE — 93010 ELECTROCARDIOGRAM REPORT: CPT | Performed by: INTERNAL MEDICINE

## 2018-12-27 PROCEDURE — 33510 CABG VEIN SINGLE: CPT | Performed by: THORACIC SURGERY (CARDIOTHORACIC VASCULAR SURGERY)

## 2018-12-27 PROCEDURE — 85027 COMPLETE CBC AUTOMATED: CPT | Performed by: THORACIC SURGERY (CARDIOTHORACIC VASCULAR SURGERY)

## 2018-12-27 PROCEDURE — 80069 RENAL FUNCTION PANEL: CPT | Performed by: THORACIC SURGERY (CARDIOTHORACIC VASCULAR SURGERY)

## 2018-12-27 PROCEDURE — 02BG0ZZ EXCISION OF MITRAL VALVE, OPEN APPROACH: ICD-10-PCS | Performed by: THORACIC SURGERY (CARDIOTHORACIC VASCULAR SURGERY)

## 2018-12-27 PROCEDURE — 25010000003 POTASSIUM CHLORIDE PER 2 MEQ: Performed by: THORACIC SURGERY (CARDIOTHORACIC VASCULAR SURGERY)

## 2018-12-27 PROCEDURE — 25010000002 MORPHINE (PF) 10 MG/ML SOLUTION: Performed by: THORACIC SURGERY (CARDIOTHORACIC VASCULAR SURGERY)

## 2018-12-27 PROCEDURE — 80048 BASIC METABOLIC PNL TOTAL CA: CPT | Performed by: THORACIC SURGERY (CARDIOTHORACIC VASCULAR SURGERY)

## 2018-12-27 PROCEDURE — 33427 REPAIR OF MITRAL VALVE: CPT | Performed by: SPECIALIST/TECHNOLOGIST, OTHER

## 2018-12-27 PROCEDURE — 25010000002 HEPARIN (PORCINE) PER 1000 UNITS: Performed by: THORACIC SURGERY (CARDIOTHORACIC VASCULAR SURGERY)

## 2018-12-27 PROCEDURE — 5A1221Z PERFORMANCE OF CARDIAC OUTPUT, CONTINUOUS: ICD-10-PCS | Performed by: THORACIC SURGERY (CARDIOTHORACIC VASCULAR SURGERY)

## 2018-12-27 PROCEDURE — 85018 HEMOGLOBIN: CPT

## 2018-12-27 PROCEDURE — 82962 GLUCOSE BLOOD TEST: CPT

## 2018-12-27 PROCEDURE — 021009W BYPASS CORONARY ARTERY, ONE ARTERY FROM AORTA WITH AUTOLOGOUS VENOUS TISSUE, OPEN APPROACH: ICD-10-PCS | Performed by: THORACIC SURGERY (CARDIOTHORACIC VASCULAR SURGERY)

## 2018-12-27 PROCEDURE — 25010000002 HEPARIN (PORCINE) PER 1000 UNITS

## 2018-12-27 PROCEDURE — 85014 HEMATOCRIT: CPT

## 2018-12-27 PROCEDURE — 25010000002 VANCOMYCIN 10 G RECONSTITUTED SOLUTION: Performed by: THORACIC SURGERY (CARDIOTHORACIC VASCULAR SURGERY)

## 2018-12-27 PROCEDURE — 25010000002 FENTANYL CITRATE (PF) 100 MCG/2ML SOLUTION: Performed by: ANESTHESIOLOGY

## 2018-12-27 PROCEDURE — 83735 ASSAY OF MAGNESIUM: CPT | Performed by: THORACIC SURGERY (CARDIOTHORACIC VASCULAR SURGERY)

## 2018-12-27 PROCEDURE — 25010000002 ALBUMIN HUMAN 25% PER 50 ML

## 2018-12-27 PROCEDURE — 33508 ENDOSCOPIC VEIN HARVEST: CPT | Performed by: THORACIC SURGERY (CARDIOTHORACIC VASCULAR SURGERY)

## 2018-12-27 PROCEDURE — 25010000002 MAGNESIUM SULFATE IN D5W 1G/100ML (PREMIX) 1-5 GM/100ML-% SOLUTION: Performed by: THORACIC SURGERY (CARDIOTHORACIC VASCULAR SURGERY)

## 2018-12-27 PROCEDURE — 93318 ECHO TRANSESOPHAGEAL INTRAOP: CPT | Performed by: ANESTHESIOLOGY

## 2018-12-27 PROCEDURE — 82330 ASSAY OF CALCIUM: CPT | Performed by: THORACIC SURGERY (CARDIOTHORACIC VASCULAR SURGERY)

## 2018-12-27 PROCEDURE — 06BQ4ZZ EXCISION OF LEFT SAPHENOUS VEIN, PERCUTANEOUS ENDOSCOPIC APPROACH: ICD-10-PCS | Performed by: THORACIC SURGERY (CARDIOTHORACIC VASCULAR SURGERY)

## 2018-12-27 PROCEDURE — 25010000002 METOCLOPRAMIDE PER 10 MG: Performed by: THORACIC SURGERY (CARDIOTHORACIC VASCULAR SURGERY)

## 2018-12-27 PROCEDURE — C1713 ANCHOR/SCREW BN/BN,TIS/BN: HCPCS | Performed by: THORACIC SURGERY (CARDIOTHORACIC VASCULAR SURGERY)

## 2018-12-27 PROCEDURE — P9047 ALBUMIN (HUMAN), 25%, 50ML: HCPCS

## 2018-12-27 PROCEDURE — 85730 THROMBOPLASTIN TIME PARTIAL: CPT | Performed by: THORACIC SURGERY (CARDIOTHORACIC VASCULAR SURGERY)

## 2018-12-27 PROCEDURE — 85610 PROTHROMBIN TIME: CPT | Performed by: THORACIC SURGERY (CARDIOTHORACIC VASCULAR SURGERY)

## 2018-12-27 PROCEDURE — 71045 X-RAY EXAM CHEST 1 VIEW: CPT

## 2018-12-27 DEVICE — IMPLANTABLE DEVICE: Type: IMPLANTABLE DEVICE | Site: HEART | Status: FUNCTIONAL

## 2018-12-27 DEVICE — SS SUTURE, 4 PER SLEEVE
Type: IMPLANTABLE DEVICE | Site: STERNUM | Status: FUNCTIONAL
Brand: MYO/WIRE II

## 2018-12-27 RX ORDER — POTASSIUM CHLORIDE 1.5 G/1.77G
40 POWDER, FOR SOLUTION ORAL AS NEEDED
Status: DISCONTINUED | OUTPATIENT
Start: 2018-12-27 | End: 2019-01-02 | Stop reason: HOSPADM

## 2018-12-27 RX ORDER — NALOXONE HCL 0.4 MG/ML
0.4 VIAL (ML) INJECTION
Status: DISCONTINUED | OUTPATIENT
Start: 2018-12-27 | End: 2019-01-02 | Stop reason: HOSPADM

## 2018-12-27 RX ORDER — ACETAMINOPHEN 325 MG/1
650 TABLET ORAL EVERY 4 HOURS PRN
Status: DISCONTINUED | OUTPATIENT
Start: 2018-12-27 | End: 2019-01-02 | Stop reason: HOSPADM

## 2018-12-27 RX ORDER — MORPHINE SULFATE 2 MG/ML
4 INJECTION, SOLUTION INTRAMUSCULAR; INTRAVENOUS
Status: DISCONTINUED | OUTPATIENT
Start: 2018-12-27 | End: 2019-01-02 | Stop reason: HOSPADM

## 2018-12-27 RX ORDER — DOPAMINE HYDROCHLORIDE 160 MG/100ML
2-20 INJECTION, SOLUTION INTRAVENOUS CONTINUOUS PRN
Status: DISCONTINUED | OUTPATIENT
Start: 2018-12-27 | End: 2018-12-28

## 2018-12-27 RX ORDER — BISACODYL 5 MG/1
10 TABLET, DELAYED RELEASE ORAL DAILY PRN
Status: DISCONTINUED | OUTPATIENT
Start: 2018-12-27 | End: 2019-01-02 | Stop reason: HOSPADM

## 2018-12-27 RX ORDER — NICOTINE POLACRILEX 4 MG
15 LOZENGE BUCCAL
Status: DISCONTINUED | OUTPATIENT
Start: 2018-12-27 | End: 2019-01-02 | Stop reason: HOSPADM

## 2018-12-27 RX ORDER — POTASSIUM CHLORIDE 7.45 MG/ML
10 INJECTION INTRAVENOUS
Status: DISCONTINUED | OUTPATIENT
Start: 2018-12-27 | End: 2019-01-02 | Stop reason: HOSPADM

## 2018-12-27 RX ORDER — HEPARIN SODIUM 5000 [USP'U]/ML
INJECTION, SOLUTION INTRAVENOUS; SUBCUTANEOUS AS NEEDED
Status: DISCONTINUED | OUTPATIENT
Start: 2018-12-27 | End: 2018-12-27 | Stop reason: HOSPADM

## 2018-12-27 RX ORDER — NITROGLYCERIN 20 MG/100ML
INJECTION INTRAVENOUS CONTINUOUS PRN
Status: DISCONTINUED | OUTPATIENT
Start: 2018-12-27 | End: 2018-12-27 | Stop reason: SURG

## 2018-12-27 RX ORDER — MAGNESIUM SULFATE HEPTAHYDRATE 500 MG/ML
INJECTION, SOLUTION INTRAMUSCULAR; INTRAVENOUS AS NEEDED
Status: DISCONTINUED | OUTPATIENT
Start: 2018-12-27 | End: 2018-12-27 | Stop reason: SURG

## 2018-12-27 RX ORDER — POTASSIUM CHLORIDE 29.8 MG/ML
20 INJECTION INTRAVENOUS
Status: COMPLETED | OUTPATIENT
Start: 2018-12-27 | End: 2018-12-27

## 2018-12-27 RX ORDER — VECURONIUM BROMIDE 1 MG/ML
INJECTION, POWDER, LYOPHILIZED, FOR SOLUTION INTRAVENOUS AS NEEDED
Status: DISCONTINUED | OUTPATIENT
Start: 2018-12-27 | End: 2018-12-27

## 2018-12-27 RX ORDER — AMINOCAPROIC ACID 250 MG/ML
INJECTION, SOLUTION INTRAVENOUS AS NEEDED
Status: DISCONTINUED | OUTPATIENT
Start: 2018-12-27 | End: 2018-12-27 | Stop reason: SURG

## 2018-12-27 RX ORDER — SUFENTANIL CITRATE 50 UG/ML
INJECTION EPIDURAL; INTRAVENOUS AS NEEDED
Status: DISCONTINUED | OUTPATIENT
Start: 2018-12-27 | End: 2018-12-27 | Stop reason: SURG

## 2018-12-27 RX ORDER — POTASSIUM CHLORIDE 29.8 MG/ML
20 INJECTION INTRAVENOUS
Status: DISCONTINUED | OUTPATIENT
Start: 2018-12-27 | End: 2019-01-02 | Stop reason: HOSPADM

## 2018-12-27 RX ORDER — MAGNESIUM SULFATE HEPTAHYDRATE 500 MG/ML
INJECTION, SOLUTION INTRAMUSCULAR; INTRAVENOUS AS NEEDED
Status: DISCONTINUED | OUTPATIENT
Start: 2018-12-27 | End: 2018-12-27

## 2018-12-27 RX ORDER — ATORVASTATIN CALCIUM 20 MG/1
40 TABLET, FILM COATED ORAL NIGHTLY
Status: DISCONTINUED | OUTPATIENT
Start: 2018-12-27 | End: 2019-01-02 | Stop reason: HOSPADM

## 2018-12-27 RX ORDER — PROPOFOL 10 MG/ML
VIAL (ML) INTRAVENOUS CONTINUOUS PRN
Status: DISCONTINUED | OUTPATIENT
Start: 2018-12-27 | End: 2018-12-27 | Stop reason: SURG

## 2018-12-27 RX ORDER — VANCOMYCIN HYDROCHLORIDE 1 G/20ML
INJECTION, POWDER, LYOPHILIZED, FOR SOLUTION INTRAVENOUS AS NEEDED
Status: DISCONTINUED | OUTPATIENT
Start: 2018-12-27 | End: 2018-12-27 | Stop reason: SURG

## 2018-12-27 RX ORDER — ONDANSETRON 2 MG/ML
INJECTION INTRAMUSCULAR; INTRAVENOUS AS NEEDED
Status: DISCONTINUED | OUTPATIENT
Start: 2018-12-27 | End: 2018-12-27 | Stop reason: SURG

## 2018-12-27 RX ORDER — ACETAMINOPHEN 650 MG/1
650 SUPPOSITORY RECTAL EVERY 4 HOURS PRN
Status: DISCONTINUED | OUTPATIENT
Start: 2018-12-27 | End: 2019-01-02 | Stop reason: HOSPADM

## 2018-12-27 RX ORDER — PROTAMINE SULFATE 10 MG/ML
INJECTION, SOLUTION INTRAVENOUS AS NEEDED
Status: DISCONTINUED | OUTPATIENT
Start: 2018-12-27 | End: 2018-12-27 | Stop reason: HOSPADM

## 2018-12-27 RX ORDER — MAGNESIUM SULFATE 1 G/100ML
1 INJECTION INTRAVENOUS EVERY 8 HOURS
Status: DISCONTINUED | OUTPATIENT
Start: 2018-12-27 | End: 2018-12-28

## 2018-12-27 RX ORDER — ALPRAZOLAM 0.25 MG/1
0.25 TABLET ORAL EVERY 8 HOURS PRN
Status: DISCONTINUED | OUTPATIENT
Start: 2018-12-27 | End: 2019-01-02 | Stop reason: HOSPADM

## 2018-12-27 RX ORDER — MIDAZOLAM HYDROCHLORIDE 1 MG/ML
INJECTION INTRAMUSCULAR; INTRAVENOUS AS NEEDED
Status: DISCONTINUED | OUTPATIENT
Start: 2018-12-27 | End: 2018-12-27 | Stop reason: SURG

## 2018-12-27 RX ORDER — POTASSIUM CHLORIDE 750 MG/1
40 CAPSULE, EXTENDED RELEASE ORAL AS NEEDED
Status: DISCONTINUED | OUTPATIENT
Start: 2018-12-27 | End: 2019-01-02 | Stop reason: HOSPADM

## 2018-12-27 RX ORDER — PROPOFOL 10 MG/ML
VIAL (ML) INTRAVENOUS AS NEEDED
Status: DISCONTINUED | OUTPATIENT
Start: 2018-12-27 | End: 2018-12-27 | Stop reason: SURG

## 2018-12-27 RX ORDER — ASPIRIN 81 MG/1
81 TABLET ORAL DAILY
Status: DISCONTINUED | OUTPATIENT
Start: 2018-12-28 | End: 2019-01-02 | Stop reason: HOSPADM

## 2018-12-27 RX ORDER — CHLORHEXIDINE GLUCONATE 0.12 MG/ML
15 RINSE ORAL EVERY 12 HOURS
Status: DISCONTINUED | OUTPATIENT
Start: 2018-12-27 | End: 2018-12-28

## 2018-12-27 RX ORDER — FENTANYL CITRATE 50 UG/ML
INJECTION, SOLUTION INTRAMUSCULAR; INTRAVENOUS AS NEEDED
Status: DISCONTINUED | OUTPATIENT
Start: 2018-12-27 | End: 2018-12-27 | Stop reason: SURG

## 2018-12-27 RX ORDER — NITROGLYCERIN 5 MG/ML
INJECTION, SOLUTION INTRAVENOUS AS NEEDED
Status: DISCONTINUED | OUTPATIENT
Start: 2018-12-27 | End: 2018-12-27 | Stop reason: SURG

## 2018-12-27 RX ORDER — METOCLOPRAMIDE HYDROCHLORIDE 5 MG/ML
10 INJECTION INTRAMUSCULAR; INTRAVENOUS EVERY 6 HOURS
Status: DISCONTINUED | OUTPATIENT
Start: 2018-12-27 | End: 2018-12-28

## 2018-12-27 RX ORDER — PROMETHAZINE HYDROCHLORIDE 25 MG/1
12.5 TABLET ORAL EVERY 6 HOURS PRN
Status: DISCONTINUED | OUTPATIENT
Start: 2018-12-27 | End: 2019-01-02 | Stop reason: HOSPADM

## 2018-12-27 RX ORDER — NITROGLYCERIN 20 MG/100ML
5-200 INJECTION INTRAVENOUS
Status: DISCONTINUED | OUTPATIENT
Start: 2018-12-27 | End: 2018-12-28

## 2018-12-27 RX ORDER — DEXTROSE MONOHYDRATE 25 G/50ML
25 INJECTION, SOLUTION INTRAVENOUS
Status: DISCONTINUED | OUTPATIENT
Start: 2018-12-27 | End: 2019-01-02 | Stop reason: HOSPADM

## 2018-12-27 RX ORDER — VECURONIUM BROMIDE 1 MG/ML
INJECTION, POWDER, LYOPHILIZED, FOR SOLUTION INTRAVENOUS AS NEEDED
Status: DISCONTINUED | OUTPATIENT
Start: 2018-12-27 | End: 2018-12-27 | Stop reason: SURG

## 2018-12-27 RX ORDER — EPHEDRINE SULFATE 50 MG/ML
INJECTION, SOLUTION INTRAVENOUS AS NEEDED
Status: DISCONTINUED | OUTPATIENT
Start: 2018-12-27 | End: 2018-12-27 | Stop reason: SURG

## 2018-12-27 RX ORDER — PANTOPRAZOLE SODIUM 40 MG/1
40 TABLET, DELAYED RELEASE ORAL DAILY
Status: DISCONTINUED | OUTPATIENT
Start: 2018-12-28 | End: 2019-01-02 | Stop reason: HOSPADM

## 2018-12-27 RX ORDER — PROMETHAZINE HYDROCHLORIDE 25 MG/ML
12.5 INJECTION, SOLUTION INTRAMUSCULAR; INTRAVENOUS EVERY 6 HOURS PRN
Status: DISCONTINUED | OUTPATIENT
Start: 2018-12-27 | End: 2019-01-02 | Stop reason: HOSPADM

## 2018-12-27 RX ORDER — PAPAVERINE HYDROCHLORIDE 30 MG/ML
INJECTION INTRAMUSCULAR; INTRAVENOUS AS NEEDED
Status: DISCONTINUED | OUTPATIENT
Start: 2018-12-27 | End: 2018-12-27 | Stop reason: HOSPADM

## 2018-12-27 RX ORDER — SODIUM CHLORIDE 9 MG/ML
INJECTION, SOLUTION INTRAVENOUS CONTINUOUS PRN
Status: DISCONTINUED | OUTPATIENT
Start: 2018-12-27 | End: 2018-12-27 | Stop reason: SURG

## 2018-12-27 RX ORDER — MEPERIDINE HYDROCHLORIDE 25 MG/ML
25 INJECTION INTRAMUSCULAR; INTRAVENOUS; SUBCUTANEOUS EVERY 4 HOURS PRN
Status: ACTIVE | OUTPATIENT
Start: 2018-12-27 | End: 2018-12-27

## 2018-12-27 RX ORDER — HEPARIN SODIUM 1000 [USP'U]/ML
INJECTION, SOLUTION INTRAVENOUS; SUBCUTANEOUS AS NEEDED
Status: DISCONTINUED | OUTPATIENT
Start: 2018-12-27 | End: 2018-12-27 | Stop reason: SURG

## 2018-12-27 RX ORDER — MILRINONE LACTATE 0.2 MG/ML
.25-.75 INJECTION, SOLUTION INTRAVENOUS CONTINUOUS PRN
Status: DISCONTINUED | OUTPATIENT
Start: 2018-12-27 | End: 2018-12-28

## 2018-12-27 RX ORDER — SODIUM CHLORIDE 9 MG/ML
30 INJECTION, SOLUTION INTRAVENOUS CONTINUOUS PRN
Status: DISCONTINUED | OUTPATIENT
Start: 2018-12-27 | End: 2019-01-02 | Stop reason: HOSPADM

## 2018-12-27 RX ORDER — BISACODYL 10 MG
10 SUPPOSITORY, RECTAL RECTAL DAILY PRN
Status: DISCONTINUED | OUTPATIENT
Start: 2018-12-28 | End: 2019-01-02 | Stop reason: HOSPADM

## 2018-12-27 RX ORDER — CYCLOBENZAPRINE HCL 10 MG
10 TABLET ORAL EVERY 8 HOURS PRN
Status: DISCONTINUED | OUTPATIENT
Start: 2018-12-28 | End: 2019-01-02 | Stop reason: HOSPADM

## 2018-12-27 RX ORDER — LIDOCAINE HYDROCHLORIDE 40 MG/ML
SOLUTION TOPICAL AS NEEDED
Status: DISCONTINUED | OUTPATIENT
Start: 2018-12-27 | End: 2018-12-27 | Stop reason: SURG

## 2018-12-27 RX ORDER — SODIUM CHLORIDE 9 MG/ML
30 INJECTION, SOLUTION INTRAVENOUS CONTINUOUS
Status: DISCONTINUED | OUTPATIENT
Start: 2018-12-27 | End: 2019-01-02 | Stop reason: HOSPADM

## 2018-12-27 RX ORDER — SODIUM CHLORIDE 0.9 % (FLUSH) 0.9 %
30 SYRINGE (ML) INJECTION ONCE AS NEEDED
Status: DISCONTINUED | OUTPATIENT
Start: 2018-12-27 | End: 2019-01-02 | Stop reason: HOSPADM

## 2018-12-27 RX ORDER — MIDAZOLAM HYDROCHLORIDE 1 MG/ML
2 INJECTION INTRAMUSCULAR; INTRAVENOUS
Status: DISCONTINUED | OUTPATIENT
Start: 2018-12-27 | End: 2018-12-28

## 2018-12-27 RX ORDER — NICARDIPINE HYDROCHLORIDE 2.5 MG/ML
INJECTION INTRAVENOUS AS NEEDED
Status: DISCONTINUED | OUTPATIENT
Start: 2018-12-27 | End: 2018-12-27 | Stop reason: SURG

## 2018-12-27 RX ORDER — FUROSEMIDE 10 MG/ML
40 INJECTION INTRAMUSCULAR; INTRAVENOUS EVERY 6 HOURS PRN
Status: DISCONTINUED | OUTPATIENT
Start: 2018-12-27 | End: 2019-01-02 | Stop reason: HOSPADM

## 2018-12-27 RX ORDER — OXYCODONE HYDROCHLORIDE 5 MG/1
10 TABLET ORAL EVERY 4 HOURS PRN
Status: DISCONTINUED | OUTPATIENT
Start: 2018-12-27 | End: 2019-01-02 | Stop reason: HOSPADM

## 2018-12-27 RX ORDER — LIDOCAINE HYDROCHLORIDE 20 MG/ML
INJECTION, SOLUTION INFILTRATION; PERINEURAL AS NEEDED
Status: DISCONTINUED | OUTPATIENT
Start: 2018-12-27 | End: 2018-12-27 | Stop reason: SURG

## 2018-12-27 RX ORDER — ONDANSETRON 2 MG/ML
4 INJECTION INTRAMUSCULAR; INTRAVENOUS EVERY 6 HOURS PRN
Status: DISCONTINUED | OUTPATIENT
Start: 2018-12-27 | End: 2019-01-02 | Stop reason: HOSPADM

## 2018-12-27 RX ORDER — NOREPINEPHRINE BIT/0.9 % NACL 8 MG/250ML
.02-.3 INFUSION BOTTLE (ML) INTRAVENOUS CONTINUOUS PRN
Status: DISCONTINUED | OUTPATIENT
Start: 2018-12-27 | End: 2018-12-28

## 2018-12-27 RX ORDER — ALBUMIN, HUMAN INJ 5% 5 %
1500 SOLUTION INTRAVENOUS AS NEEDED
Status: DISPENSED | OUTPATIENT
Start: 2018-12-27 | End: 2018-12-28

## 2018-12-27 RX ORDER — MORPHINE SULFATE 2 MG/ML
1 INJECTION, SOLUTION INTRAMUSCULAR; INTRAVENOUS EVERY 4 HOURS PRN
Status: DISCONTINUED | OUTPATIENT
Start: 2018-12-27 | End: 2019-01-02 | Stop reason: HOSPADM

## 2018-12-27 RX ORDER — SENNA AND DOCUSATE SODIUM 50; 8.6 MG/1; MG/1
2 TABLET, FILM COATED ORAL NIGHTLY
Status: DISCONTINUED | OUTPATIENT
Start: 2018-12-28 | End: 2019-01-02 | Stop reason: HOSPADM

## 2018-12-27 RX ORDER — HYDROCODONE BITARTRATE AND ACETAMINOPHEN 5; 325 MG/1; MG/1
2 TABLET ORAL EVERY 4 HOURS PRN
Status: DISCONTINUED | OUTPATIENT
Start: 2018-12-27 | End: 2019-01-02 | Stop reason: HOSPADM

## 2018-12-27 RX ADMIN — EPHEDRINE SULFATE 20 MG: 50 INJECTION INTRAMUSCULAR; INTRAVENOUS; SUBCUTANEOUS at 07:10

## 2018-12-27 RX ADMIN — CHLORHEXIDINE GLUCONATE 15 ML: 1.2 RINSE ORAL at 23:10

## 2018-12-27 RX ADMIN — Medication 1 MG: at 06:53

## 2018-12-27 RX ADMIN — LIDOCAINE HYDROCHLORIDE 1 EACH: 40 SOLUTION TOPICAL at 06:58

## 2018-12-27 RX ADMIN — SUFENTANIL CITRATE 50 MCG: 50 INJECTION, SOLUTION EPIDURAL; INTRAVENOUS at 08:00

## 2018-12-27 RX ADMIN — ATORVASTATIN CALCIUM 40 MG: 20 TABLET, FILM COATED ORAL at 20:06

## 2018-12-27 RX ADMIN — METOCLOPRAMIDE 10 MG: 5 INJECTION, SOLUTION INTRAMUSCULAR; INTRAVENOUS at 23:10

## 2018-12-27 RX ADMIN — MAGNESIUM SULFATE HEPTAHYDRATE 1 G: 1 INJECTION, SOLUTION INTRAVENOUS at 12:16

## 2018-12-27 RX ADMIN — SODIUM CHLORIDE: 9 INJECTION, SOLUTION INTRAVENOUS at 06:35

## 2018-12-27 RX ADMIN — METOPROLOL TARTRATE 12.5 MG: 25 TABLET ORAL at 05:43

## 2018-12-27 RX ADMIN — EPHEDRINE SULFATE 20 MG: 50 INJECTION INTRAMUSCULAR; INTRAVENOUS; SUBCUTANEOUS at 07:00

## 2018-12-27 RX ADMIN — EPHEDRINE SULFATE 10 MG: 50 INJECTION INTRAMUSCULAR; INTRAVENOUS; SUBCUTANEOUS at 07:25

## 2018-12-27 RX ADMIN — SODIUM CHLORIDE 5 MG/HR: 9 INJECTION, SOLUTION INTRAVENOUS at 10:00

## 2018-12-27 RX ADMIN — POTASSIUM CHLORIDE 20 MEQ: 29.8 INJECTION, SOLUTION INTRAVENOUS at 14:44

## 2018-12-27 RX ADMIN — MAGNESIUM SULFATE HEPTAHYDRATE 2 G: 500 INJECTION, SOLUTION INTRAMUSCULAR; INTRAVENOUS at 09:01

## 2018-12-27 RX ADMIN — AMINOCAPROIC ACID 10 G: 250 INJECTION, SOLUTION INTRAVENOUS at 07:29

## 2018-12-27 RX ADMIN — PHENYLEPHRINE HYDROCHLORIDE 100 MCG: 10 INJECTION INTRAVENOUS at 06:56

## 2018-12-27 RX ADMIN — PHENYLEPHRINE HYDROCHLORIDE 100 MCG: 10 INJECTION INTRAVENOUS at 07:10

## 2018-12-27 RX ADMIN — ALBUMIN (HUMAN) 250 ML: 12.5 SOLUTION INTRAVENOUS at 13:33

## 2018-12-27 RX ADMIN — OXYCODONE HYDROCHLORIDE 10 MG: 5 TABLET ORAL at 15:51

## 2018-12-27 RX ADMIN — EPHEDRINE SULFATE 20 MG: 50 INJECTION INTRAMUSCULAR; INTRAVENOUS; SUBCUTANEOUS at 07:16

## 2018-12-27 RX ADMIN — PROPOFOL 50 MCG/KG/MIN: 10 INJECTION, EMULSION INTRAVENOUS at 07:54

## 2018-12-27 RX ADMIN — PROPOFOL 80 MG: 10 INJECTION, EMULSION INTRAVENOUS at 07:40

## 2018-12-27 RX ADMIN — ALBUMIN (HUMAN) 250 ML: 12.5 SOLUTION INTRAVENOUS at 12:31

## 2018-12-27 RX ADMIN — SUFENTANIL CITRATE 50 MCG: 50 INJECTION, SOLUTION EPIDURAL; INTRAVENOUS at 07:40

## 2018-12-27 RX ADMIN — MORPHINE SULFATE 1 MG: 10 INJECTION INTRAVENOUS at 20:54

## 2018-12-27 RX ADMIN — VECURONIUM BROMIDE 5 MG: 1 INJECTION, POWDER, LYOPHILIZED, FOR SOLUTION INTRAVENOUS at 08:00

## 2018-12-27 RX ADMIN — NITROGLYCERIN 0.25 MCG/KG/MIN: 20 INJECTION INTRAVENOUS at 07:50

## 2018-12-27 RX ADMIN — INSULIN LISPRO 2 UNITS: 100 INJECTION, SOLUTION INTRAVENOUS; SUBCUTANEOUS at 20:13

## 2018-12-27 RX ADMIN — NITROGLYCERIN 100 MCG: 5 INJECTION, SOLUTION INTRAVENOUS at 07:58

## 2018-12-27 RX ADMIN — SUFENTANIL CITRATE 50 MCG: 50 INJECTION, SOLUTION EPIDURAL; INTRAVENOUS at 06:53

## 2018-12-27 RX ADMIN — AMINOCAPROIC ACID 10 G: 250 INJECTION, SOLUTION INTRAVENOUS at 09:32

## 2018-12-27 RX ADMIN — HEPARIN SODIUM 29000 UNITS: 1000 INJECTION, SOLUTION INTRAVENOUS; SUBCUTANEOUS at 07:53

## 2018-12-27 RX ADMIN — PHENYLEPHRINE HYDROCHLORIDE 50 MCG: 10 INJECTION INTRAVENOUS at 10:19

## 2018-12-27 RX ADMIN — MUPIROCIN 10 APPLICATION: 20 OINTMENT TOPICAL at 20:06

## 2018-12-27 RX ADMIN — ACETAMINOPHEN 650 MG: 325 TABLET, FILM COATED ORAL at 17:30

## 2018-12-27 RX ADMIN — SODIUM CHLORIDE 0.4 MCG/KG/HR: 900 INJECTION, SOLUTION INTRAVENOUS at 07:20

## 2018-12-27 RX ADMIN — NICARDIPINE HYDROCHLORIDE 0.1 MG: 25 INJECTION INTRAVENOUS at 09:36

## 2018-12-27 RX ADMIN — FENTANYL CITRATE 50 MCG: 50 INJECTION INTRAMUSCULAR; INTRAVENOUS at 06:43

## 2018-12-27 RX ADMIN — NITROGLYCERIN 200 MCG: 5 INJECTION, SOLUTION INTRAVENOUS at 07:48

## 2018-12-27 RX ADMIN — VANCOMYCIN HYDROCHLORIDE 1500 MG: 10 INJECTION, POWDER, LYOPHILIZED, FOR SOLUTION INTRAVENOUS at 18:38

## 2018-12-27 RX ADMIN — CYCLOBENZAPRINE 10 MG: 10 TABLET, FILM COATED ORAL at 17:36

## 2018-12-27 RX ADMIN — MORPHINE SULFATE 2 MG: 10 INJECTION INTRAVENOUS at 15:15

## 2018-12-27 RX ADMIN — SODIUM CHLORIDE 0.25 MCG/KG/MIN: 0.9 INJECTION, SOLUTION INTRAVENOUS at 08:31

## 2018-12-27 RX ADMIN — PHENYLEPHRINE HYDROCHLORIDE 100 MCG: 10 INJECTION INTRAVENOUS at 07:00

## 2018-12-27 RX ADMIN — NITROGLYCERIN 100 MCG: 5 INJECTION, SOLUTION INTRAVENOUS at 07:41

## 2018-12-27 RX ADMIN — SUGAMMADEX 400 MG: 100 INJECTION, SOLUTION INTRAVENOUS at 10:43

## 2018-12-27 RX ADMIN — VECURONIUM BROMIDE 5 MG: 1 INJECTION, POWDER, LYOPHILIZED, FOR SOLUTION INTRAVENOUS at 09:01

## 2018-12-27 RX ADMIN — LIDOCAINE HYDROCHLORIDE 60 MG: 20 INJECTION, SOLUTION INFILTRATION; PERINEURAL at 06:53

## 2018-12-27 RX ADMIN — Medication 2 MG: at 06:43

## 2018-12-27 RX ADMIN — PHENYLEPHRINE HYDROCHLORIDE 1 MCG/KG/MIN: 10 INJECTION, SOLUTION INTRAMUSCULAR; INTRAVENOUS; SUBCUTANEOUS at 08:10

## 2018-12-27 RX ADMIN — NICARDIPINE HYDROCHLORIDE 0.2 MG: 25 INJECTION INTRAVENOUS at 10:00

## 2018-12-27 RX ADMIN — MORPHINE SULFATE 2 MG: 10 INJECTION INTRAVENOUS at 14:53

## 2018-12-27 RX ADMIN — NITROGLYCERIN 100 MCG: 5 INJECTION, SOLUTION INTRAVENOUS at 07:50

## 2018-12-27 RX ADMIN — VANCOMYCIN HYDROCHLORIDE 1.5 G: 1 INJECTION, POWDER, LYOPHILIZED, FOR SOLUTION INTRAVENOUS at 07:17

## 2018-12-27 RX ADMIN — EPHEDRINE SULFATE 10 MG: 50 INJECTION INTRAMUSCULAR; INTRAVENOUS; SUBCUTANEOUS at 06:56

## 2018-12-27 RX ADMIN — SUFENTANIL CITRATE 50 MCG: 50 INJECTION, SOLUTION EPIDURAL; INTRAVENOUS at 09:02

## 2018-12-27 RX ADMIN — FENTANYL CITRATE 50 MCG: 50 INJECTION INTRAMUSCULAR; INTRAVENOUS at 06:46

## 2018-12-27 RX ADMIN — ALBUMIN (HUMAN) 500 ML: 12.5 SOLUTION INTRAVENOUS at 11:05

## 2018-12-27 RX ADMIN — PROPOFOL 120 MG: 10 INJECTION, EMULSION INTRAVENOUS at 06:53

## 2018-12-27 RX ADMIN — ONDANSETRON 4 MG: 2 INJECTION INTRAMUSCULAR; INTRAVENOUS at 09:21

## 2018-12-27 RX ADMIN — OXYCODONE HYDROCHLORIDE 10 MG: 5 TABLET ORAL at 20:07

## 2018-12-27 RX ADMIN — PHENYLEPHRINE HYDROCHLORIDE 100 MCG: 10 INJECTION INTRAVENOUS at 07:25

## 2018-12-27 RX ADMIN — VECURONIUM BROMIDE 10 MG: 1 INJECTION, POWDER, LYOPHILIZED, FOR SOLUTION INTRAVENOUS at 06:53

## 2018-12-27 RX ADMIN — NITROGLYCERIN 100 MCG: 5 INJECTION, SOLUTION INTRAVENOUS at 07:54

## 2018-12-27 NOTE — ANESTHESIA PREPROCEDURE EVALUATION
Anesthesia Evaluation     Patient summary reviewed and Nursing notes reviewed                Airway   Mallampati: II  TM distance: <3 FB  Neck ROM: full  Possible difficult intubation  Dental - normal exam     Pulmonary - normal exam   (+) shortness of breath,   Cardiovascular - normal exam    (+) hypertension, valvular problems/murmurs MR, AI and MVP, CAD, CHF, MCINTOSH, murmur, hyperlipidemia,       Neuro/Psych  GI/Hepatic/Renal/Endo    (+)   renal disease CRI,     Musculoskeletal     Abdominal  - normal exam   Substance History      OB/GYN          Other                      Anesthesia Plan    ASA 4     general   (Art line/cordis/SGC/REINALDO)  intravenous induction   Anesthetic plan, all risks, benefits, and alternatives have been provided, discussed and informed consent has been obtained with: patient.

## 2018-12-27 NOTE — ANESTHESIA PROCEDURE NOTES
Central Line      Patient location during procedure: OR  Start time: 12/27/2018 7:07 AM  Stop Time:12/27/2018 7:13 AM  Indications: vascular access  Staff  Anesthesiologist: Nasir Shaw MD  Preanesthetic Checklist  Completed: patient identified, site marked, surgical consent, pre-op evaluation, timeout performed, IV checked, risks and benefits discussed and monitors and equipment checked  Central Line Prep  Sterile Tech:cap, gloves, gown, mask and sterile barriers  Prep: chloraprep  Patient monitoring: blood pressure monitoring, continuous pulse oximetry and EKG  Central Line Procedure  Laterality:right  Location:internal jugular  Catheter Type:[place]-Vee, Cordis and single lumen  Catheter Size:9 Fr  Guidance:landmark technique  Assessment  Post procedure:biopatch applied, line sutured and occlusive dressing applied  Assessement:blood return through all ports, free fluid flow and Alejandro Test  Complications:no  Patient Tolerance:patient tolerated the procedure well with no apparent complications

## 2018-12-27 NOTE — ANESTHESIA PROCEDURE NOTES
Arterial Line      Patient location during procedure: OR  Start time: 12/27/2018 6:46 AM  Stop Time:12/27/2018 6:48 AM       Line placed for hemodynamic monitoring.  Performed By   Anesthesiologist: Nasir Shaw MD  Preanesthetic Checklist  Completed: patient identified, site marked, surgical consent, pre-op evaluation, timeout performed, IV checked, risks and benefits discussed and monitors and equipment checked  Arterial Line Prep   Sterile Tech: cap, gloves and mask  Prep: ChloraPrep  Patient monitoring: blood pressure monitoring, continuous pulse oximetry and EKG  Arterial Line Procedure   Laterality:left  Location:  radial artery  Catheter size: 20 G   Guidance: palpation technique  Number of attempts: 1  Successful placement: yes          Post Assessment   Dressing Type: biopatch applied, occlusive dressing applied, secured with tape and wrist guard applied.   Complications no  Circ/Move/Sens Assessment: normal and unchanged.   Patient Tolerance: patient tolerated the procedure well with no apparent complications

## 2018-12-27 NOTE — ANESTHESIA PROCEDURE NOTES
ANESTHESIA INTUBATION  Urgency: elective    Airway not difficult    General Information and Staff    Patient location during procedure: OR  Anesthesiologist: Nasir Shaw MD    Indications and Patient Condition  Indications for airway management: airway protection    Preoxygenated: yes  MILS maintained throughout  Mask difficulty assessment: 1 - vent by mask    Final Airway Details  Final airway type: endotracheal airway      Successful airway: ETT  Cuffed: yes   Successful intubation technique: direct laryngoscopy  Endotracheal tube insertion site: oral  Blade: Cachorro  Blade size: 3  ETT size (mm): 8.0  Cormack-Lehane Classification: grade IIa - partial view of glottis  Placement verified by: chest auscultation and capnometry   Measured from: lips  ETT to lips (cm): 22  Number of attempts at approach: 1

## 2018-12-27 NOTE — ANESTHESIA POSTPROCEDURE EVALUATION
Patient: Jw Lazcano    Procedure Summary     Date:  12/27/18 Room / Location:  [facility] OR [address] [place] [address] MAIN OR    Anesthesia Start:  0635 Anesthesia Stop:  1048    Procedure:  REINALDO STERNOTOMY CORONARY ARTERY BYPASS GRAFT TIMES 1 USING LEFT INTERNAL MAMMARY ARTERY AND LEFT GREATER SAPHENOUS VEIN GRAFT PER ENDOSCOPIC VEIN HARVESTING, MITRAL VALVE REPAIR AND PRP (N/A Chest) Diagnosis:       Mitral valve insufficiency, unspecified etiology      (Mitral valve insufficiency, unspecified etiology [I34.0])    Surgeon:  Miguel Lobo MD Provider:  Nasir Shaw MD    Anesthesia Type:  general ASA Status:  4          Anesthesia Type: general  Last vitals  BP   139/91 (12/27/18 0543)   Temp   36.7 °C (98.1 °F) (12/27/18 0350)   Pulse   78 (12/27/18 1043)   Resp   12 (12/27/18 1043)     SpO2   100 % (12/27/18 1043)     Post Anesthesia Care and Evaluation    Patient location during evaluation: PACU  Patient participation: complete - patient cannot participate  Level of consciousness: obtunded/minimal responses  Pain management: adequate  Airway patency: patent  Anesthetic complications: No anesthetic complications  PONV Status: NA  Cardiovascular status: acceptable  Respiratory status: acceptable, ETT, intubated and ventilator  Hydration status: acceptable

## 2018-12-27 NOTE — ANESTHESIA PROCEDURE NOTES
Procedure Performed: Emergent/Open-Heart Anesthesia REINALDO     Start Time:        End Time:        General Procedure Information  REINALDO Placed for monitoring purposes only -- This is not a diagnostic REINALDO

## 2018-12-28 ENCOUNTER — APPOINTMENT (OUTPATIENT)
Dept: GENERAL RADIOLOGY | Facility: HOSPITAL | Age: 63
End: 2018-12-28

## 2018-12-28 LAB
ANION GAP SERPL CALCULATED.3IONS-SCNC: 12 MMOL/L
BASOPHILS # BLD AUTO: 0.01 10*3/MM3 (ref 0–0.2)
BASOPHILS NFR BLD AUTO: 0.1 % (ref 0–1.5)
BUN BLD-MCNC: 21 MG/DL (ref 8–23)
BUN/CREAT SERPL: 17.2 (ref 7–25)
CALCIUM SPEC-SCNC: 8.4 MG/DL (ref 8.6–10.5)
CHLORIDE SERPL-SCNC: 114 MMOL/L (ref 98–107)
CO2 SERPL-SCNC: 20 MMOL/L (ref 22–29)
CREAT BLD-MCNC: 1.22 MG/DL (ref 0.76–1.27)
CYTO UR: NORMAL
DEPRECATED RDW RBC AUTO: 43.2 FL (ref 37–54)
EOSINOPHIL # BLD AUTO: 0.01 10*3/MM3 (ref 0–0.7)
EOSINOPHIL NFR BLD AUTO: 0.1 % (ref 0.3–6.2)
ERYTHROCYTE [DISTWIDTH] IN BLOOD BY AUTOMATED COUNT: 13.2 % (ref 11.5–14.5)
GFR SERPL CREATININE-BSD FRML MDRD: 60 ML/MIN/1.73
GLUCOSE BLD-MCNC: 144 MG/DL (ref 65–99)
GLUCOSE BLDC GLUCOMTR-MCNC: 106 MG/DL (ref 70–130)
GLUCOSE BLDC GLUCOMTR-MCNC: 116 MG/DL (ref 70–130)
GLUCOSE BLDC GLUCOMTR-MCNC: 117 MG/DL (ref 70–130)
GLUCOSE BLDC GLUCOMTR-MCNC: 134 MG/DL (ref 70–130)
HCT VFR BLD AUTO: 32.9 % (ref 40.4–52.2)
HGB BLD-MCNC: 10.9 G/DL (ref 13.7–17.6)
IMM GRANULOCYTES # BLD AUTO: 0.06 10*3/MM3 (ref 0–0.03)
IMM GRANULOCYTES NFR BLD AUTO: 0.4 % (ref 0–0.5)
INR PPP: 1.34 (ref 0.9–1.1)
LAB AP CASE REPORT: NORMAL
LYMPHOCYTES # BLD AUTO: 1.35 10*3/MM3 (ref 0.9–4.8)
LYMPHOCYTES NFR BLD AUTO: 8.4 % (ref 19.6–45.3)
MAGNESIUM SERPL-MCNC: 2.5 MG/DL (ref 1.6–2.4)
MCH RBC QN AUTO: 29.5 PG (ref 27–32.7)
MCHC RBC AUTO-ENTMCNC: 33.1 G/DL (ref 32.6–36.4)
MCV RBC AUTO: 88.9 FL (ref 79.8–96.2)
MONOCYTES # BLD AUTO: 1.35 10*3/MM3 (ref 0.2–1.2)
MONOCYTES NFR BLD AUTO: 8.4 % (ref 5–12)
NEUTROPHILS # BLD AUTO: 13.26 10*3/MM3 (ref 1.9–8.1)
NEUTROPHILS NFR BLD AUTO: 83 % (ref 42.7–76)
PATH REPORT.FINAL DX SPEC: NORMAL
PATH REPORT.GROSS SPEC: NORMAL
PLATELET # BLD AUTO: 140 10*3/MM3 (ref 140–500)
PMV BLD AUTO: 10.4 FL (ref 6–12)
POTASSIUM BLD-SCNC: 4.4 MMOL/L (ref 3.5–5.2)
PROTHROMBIN TIME: 16.4 SECONDS (ref 11.7–14.2)
RBC # BLD AUTO: 3.7 10*6/MM3 (ref 4.6–6)
SODIUM BLD-SCNC: 146 MMOL/L (ref 136–145)
WBC NRBC COR # BLD: 15.98 10*3/MM3 (ref 4.5–10.7)

## 2018-12-28 PROCEDURE — 97162 PT EVAL MOD COMPLEX 30 MIN: CPT

## 2018-12-28 PROCEDURE — P9041 ALBUMIN (HUMAN),5%, 50ML: HCPCS | Performed by: THORACIC SURGERY (CARDIOTHORACIC VASCULAR SURGERY)

## 2018-12-28 PROCEDURE — 25010000002 ENOXAPARIN PER 10 MG: Performed by: THORACIC SURGERY (CARDIOTHORACIC VASCULAR SURGERY)

## 2018-12-28 PROCEDURE — 93005 ELECTROCARDIOGRAM TRACING: CPT | Performed by: THORACIC SURGERY (CARDIOTHORACIC VASCULAR SURGERY)

## 2018-12-28 PROCEDURE — 82962 GLUCOSE BLOOD TEST: CPT

## 2018-12-28 PROCEDURE — 93010 ELECTROCARDIOGRAM REPORT: CPT | Performed by: INTERNAL MEDICINE

## 2018-12-28 PROCEDURE — 25010000002 METOCLOPRAMIDE PER 10 MG: Performed by: THORACIC SURGERY (CARDIOTHORACIC VASCULAR SURGERY)

## 2018-12-28 PROCEDURE — 83735 ASSAY OF MAGNESIUM: CPT | Performed by: THORACIC SURGERY (CARDIOTHORACIC VASCULAR SURGERY)

## 2018-12-28 PROCEDURE — 25010000002 FUROSEMIDE PER 20 MG: Performed by: NURSE PRACTITIONER

## 2018-12-28 PROCEDURE — 25010000002 VANCOMYCIN 10 G RECONSTITUTED SOLUTION: Performed by: THORACIC SURGERY (CARDIOTHORACIC VASCULAR SURGERY)

## 2018-12-28 PROCEDURE — 94799 UNLISTED PULMONARY SVC/PX: CPT

## 2018-12-28 PROCEDURE — 85610 PROTHROMBIN TIME: CPT | Performed by: THORACIC SURGERY (CARDIOTHORACIC VASCULAR SURGERY)

## 2018-12-28 PROCEDURE — 85025 COMPLETE CBC W/AUTO DIFF WBC: CPT | Performed by: THORACIC SURGERY (CARDIOTHORACIC VASCULAR SURGERY)

## 2018-12-28 PROCEDURE — 97110 THERAPEUTIC EXERCISES: CPT

## 2018-12-28 PROCEDURE — 71045 X-RAY EXAM CHEST 1 VIEW: CPT

## 2018-12-28 PROCEDURE — 80048 BASIC METABOLIC PNL TOTAL CA: CPT | Performed by: THORACIC SURGERY (CARDIOTHORACIC VASCULAR SURGERY)

## 2018-12-28 PROCEDURE — 25010000002 ALBUMIN HUMAN 5% PER 50 ML: Performed by: THORACIC SURGERY (CARDIOTHORACIC VASCULAR SURGERY)

## 2018-12-28 RX ORDER — DEXTROSE MONOHYDRATE 25 G/50ML
25 INJECTION, SOLUTION INTRAVENOUS
Status: DISCONTINUED | OUTPATIENT
Start: 2018-12-28 | End: 2019-01-02 | Stop reason: HOSPADM

## 2018-12-28 RX ORDER — POTASSIUM CHLORIDE 750 MG/1
20 CAPSULE, EXTENDED RELEASE ORAL DAILY
Status: DISCONTINUED | OUTPATIENT
Start: 2018-12-28 | End: 2019-01-02 | Stop reason: HOSPADM

## 2018-12-28 RX ORDER — AMIODARONE HYDROCHLORIDE 200 MG/1
400 TABLET ORAL EVERY 12 HOURS SCHEDULED
Status: DISCONTINUED | OUTPATIENT
Start: 2018-12-28 | End: 2018-12-30

## 2018-12-28 RX ORDER — NICOTINE POLACRILEX 4 MG
15 LOZENGE BUCCAL
Status: DISCONTINUED | OUTPATIENT
Start: 2018-12-28 | End: 2019-01-02 | Stop reason: HOSPADM

## 2018-12-28 RX ORDER — FUROSEMIDE 10 MG/ML
40 INJECTION INTRAMUSCULAR; INTRAVENOUS ONCE
Status: COMPLETED | OUTPATIENT
Start: 2018-12-28 | End: 2018-12-28

## 2018-12-28 RX ADMIN — MUPIROCIN: 20 OINTMENT TOPICAL at 21:00

## 2018-12-28 RX ADMIN — AMIODARONE HYDROCHLORIDE 400 MG: 200 TABLET ORAL at 11:50

## 2018-12-28 RX ADMIN — HYDROCODONE BITARTRATE AND ACETAMINOPHEN 2 TABLET: 5; 325 TABLET ORAL at 20:07

## 2018-12-28 RX ADMIN — AMIODARONE HYDROCHLORIDE 400 MG: 200 TABLET ORAL at 20:07

## 2018-12-28 RX ADMIN — HYDROCODONE BITARTRATE AND ACETAMINOPHEN 2 TABLET: 5; 325 TABLET ORAL at 11:47

## 2018-12-28 RX ADMIN — OXYCODONE HYDROCHLORIDE 10 MG: 5 TABLET ORAL at 05:06

## 2018-12-28 RX ADMIN — VANCOMYCIN HYDROCHLORIDE 1500 MG: 10 INJECTION, POWDER, LYOPHILIZED, FOR SOLUTION INTRAVENOUS at 18:41

## 2018-12-28 RX ADMIN — ACETAMINOPHEN 650 MG: 325 TABLET, FILM COATED ORAL at 23:47

## 2018-12-28 RX ADMIN — POTASSIUM CHLORIDE 20 MEQ: 750 CAPSULE, EXTENDED RELEASE ORAL at 15:58

## 2018-12-28 RX ADMIN — OXYCODONE HYDROCHLORIDE 10 MG: 5 TABLET ORAL at 23:47

## 2018-12-28 RX ADMIN — MUPIROCIN 10 APPLICATION: 20 OINTMENT TOPICAL at 08:28

## 2018-12-28 RX ADMIN — ATORVASTATIN CALCIUM 40 MG: 20 TABLET, FILM COATED ORAL at 20:07

## 2018-12-28 RX ADMIN — HYDROCODONE BITARTRATE AND ACETAMINOPHEN 2 TABLET: 5; 325 TABLET ORAL at 15:55

## 2018-12-28 RX ADMIN — FUROSEMIDE 40 MG: 10 INJECTION, SOLUTION INTRAMUSCULAR; INTRAVENOUS at 08:27

## 2018-12-28 RX ADMIN — SENNOSIDES AND DOCUSATE SODIUM 2 TABLET: 8.6; 5 TABLET ORAL at 20:07

## 2018-12-28 RX ADMIN — ASPIRIN 81 MG: 81 TABLET, DELAYED RELEASE ORAL at 08:27

## 2018-12-28 RX ADMIN — CYCLOBENZAPRINE 10 MG: 10 TABLET, FILM COATED ORAL at 23:47

## 2018-12-28 RX ADMIN — CYCLOBENZAPRINE 10 MG: 10 TABLET, FILM COATED ORAL at 15:55

## 2018-12-28 RX ADMIN — PANTOPRAZOLE SODIUM 40 MG: 40 TABLET, DELAYED RELEASE ORAL at 08:28

## 2018-12-28 RX ADMIN — HYDROCODONE BITARTRATE AND ACETAMINOPHEN 2 TABLET: 5; 325 TABLET ORAL at 08:05

## 2018-12-28 RX ADMIN — ALBUMIN (HUMAN) 250 ML: 12.5 SOLUTION INTRAVENOUS at 00:01

## 2018-12-28 RX ADMIN — ENOXAPARIN SODIUM 40 MG: 40 INJECTION SUBCUTANEOUS at 17:50

## 2018-12-28 RX ADMIN — VANCOMYCIN HYDROCHLORIDE 1500 MG: 10 INJECTION, POWDER, LYOPHILIZED, FOR SOLUTION INTRAVENOUS at 06:04

## 2018-12-28 RX ADMIN — CYCLOBENZAPRINE 10 MG: 10 TABLET, FILM COATED ORAL at 08:05

## 2018-12-28 RX ADMIN — METOCLOPRAMIDE 10 MG: 5 INJECTION, SOLUTION INTRAMUSCULAR; INTRAVENOUS at 05:10

## 2018-12-29 ENCOUNTER — APPOINTMENT (OUTPATIENT)
Dept: GENERAL RADIOLOGY | Facility: HOSPITAL | Age: 63
End: 2018-12-29

## 2018-12-29 LAB
ANION GAP SERPL CALCULATED.3IONS-SCNC: 10.8 MMOL/L
BUN BLD-MCNC: 26 MG/DL (ref 8–23)
BUN/CREAT SERPL: 25 (ref 7–25)
CALCIUM SPEC-SCNC: 8.9 MG/DL (ref 8.6–10.5)
CHLORIDE SERPL-SCNC: 110 MMOL/L (ref 98–107)
CO2 SERPL-SCNC: 23.2 MMOL/L (ref 22–29)
CREAT BLD-MCNC: 1.04 MG/DL (ref 0.76–1.27)
DEPRECATED RDW RBC AUTO: 46.9 FL (ref 37–54)
ERYTHROCYTE [DISTWIDTH] IN BLOOD BY AUTOMATED COUNT: 13.6 % (ref 11.5–14.5)
GFR SERPL CREATININE-BSD FRML MDRD: 73 ML/MIN/1.73
GLUCOSE BLD-MCNC: 120 MG/DL (ref 65–99)
GLUCOSE BLDC GLUCOMTR-MCNC: 120 MG/DL (ref 70–130)
GLUCOSE BLDC GLUCOMTR-MCNC: 131 MG/DL (ref 70–130)
GLUCOSE BLDC GLUCOMTR-MCNC: 132 MG/DL (ref 70–130)
GLUCOSE BLDC GLUCOMTR-MCNC: 94 MG/DL (ref 70–130)
HCT VFR BLD AUTO: 35.2 % (ref 40.4–52.2)
HGB BLD-MCNC: 11.1 G/DL (ref 13.7–17.6)
MCH RBC QN AUTO: 29.8 PG (ref 27–32.7)
MCHC RBC AUTO-ENTMCNC: 31.5 G/DL (ref 32.6–36.4)
MCV RBC AUTO: 94.6 FL (ref 79.8–96.2)
PLATELET # BLD AUTO: 143 10*3/MM3 (ref 140–500)
PMV BLD AUTO: 10.2 FL (ref 6–12)
POTASSIUM BLD-SCNC: 3.8 MMOL/L (ref 3.5–5.2)
RBC # BLD AUTO: 3.72 10*6/MM3 (ref 4.6–6)
SODIUM BLD-SCNC: 144 MMOL/L (ref 136–145)
WBC NRBC COR # BLD: 18.08 10*3/MM3 (ref 4.5–10.7)

## 2018-12-29 PROCEDURE — 93010 ELECTROCARDIOGRAM REPORT: CPT | Performed by: INTERNAL MEDICINE

## 2018-12-29 PROCEDURE — 97110 THERAPEUTIC EXERCISES: CPT

## 2018-12-29 PROCEDURE — 25010000002 FUROSEMIDE PER 20 MG: Performed by: NURSE PRACTITIONER

## 2018-12-29 PROCEDURE — 82962 GLUCOSE BLOOD TEST: CPT

## 2018-12-29 PROCEDURE — 93005 ELECTROCARDIOGRAM TRACING: CPT | Performed by: THORACIC SURGERY (CARDIOTHORACIC VASCULAR SURGERY)

## 2018-12-29 PROCEDURE — 99024 POSTOP FOLLOW-UP VISIT: CPT | Performed by: THORACIC SURGERY (CARDIOTHORACIC VASCULAR SURGERY)

## 2018-12-29 PROCEDURE — 80048 BASIC METABOLIC PNL TOTAL CA: CPT | Performed by: THORACIC SURGERY (CARDIOTHORACIC VASCULAR SURGERY)

## 2018-12-29 PROCEDURE — 71045 X-RAY EXAM CHEST 1 VIEW: CPT

## 2018-12-29 PROCEDURE — 25010000002 ENOXAPARIN PER 10 MG: Performed by: THORACIC SURGERY (CARDIOTHORACIC VASCULAR SURGERY)

## 2018-12-29 PROCEDURE — 85027 COMPLETE CBC AUTOMATED: CPT | Performed by: THORACIC SURGERY (CARDIOTHORACIC VASCULAR SURGERY)

## 2018-12-29 RX ORDER — LISINOPRIL 5 MG/1
5 TABLET ORAL
Status: DISCONTINUED | OUTPATIENT
Start: 2018-12-29 | End: 2019-01-02 | Stop reason: HOSPADM

## 2018-12-29 RX ORDER — FUROSEMIDE 10 MG/ML
40 INJECTION INTRAMUSCULAR; INTRAVENOUS ONCE
Status: COMPLETED | OUTPATIENT
Start: 2018-12-29 | End: 2018-12-29

## 2018-12-29 RX ORDER — FUROSEMIDE 40 MG/1
40 TABLET ORAL DAILY
Status: DISCONTINUED | OUTPATIENT
Start: 2018-12-30 | End: 2018-12-30

## 2018-12-29 RX ORDER — AMLODIPINE BESYLATE 5 MG/1
5 TABLET ORAL
Status: DISCONTINUED | OUTPATIENT
Start: 2018-12-29 | End: 2018-12-29

## 2018-12-29 RX ADMIN — HYDROCODONE BITARTRATE AND ACETAMINOPHEN 2 TABLET: 5; 325 TABLET ORAL at 23:39

## 2018-12-29 RX ADMIN — AMIODARONE HYDROCHLORIDE 400 MG: 200 TABLET ORAL at 09:09

## 2018-12-29 RX ADMIN — CYCLOBENZAPRINE 10 MG: 10 TABLET, FILM COATED ORAL at 07:45

## 2018-12-29 RX ADMIN — ATORVASTATIN CALCIUM 40 MG: 20 TABLET, FILM COATED ORAL at 21:06

## 2018-12-29 RX ADMIN — SENNOSIDES AND DOCUSATE SODIUM 2 TABLET: 8.6; 5 TABLET ORAL at 21:06

## 2018-12-29 RX ADMIN — POTASSIUM CHLORIDE 20 MEQ: 750 CAPSULE, EXTENDED RELEASE ORAL at 09:09

## 2018-12-29 RX ADMIN — ACETAMINOPHEN 650 MG: 325 TABLET, FILM COATED ORAL at 10:17

## 2018-12-29 RX ADMIN — HYDROCODONE BITARTRATE AND ACETAMINOPHEN 2 TABLET: 5; 325 TABLET ORAL at 17:24

## 2018-12-29 RX ADMIN — PANTOPRAZOLE SODIUM 40 MG: 40 TABLET, DELAYED RELEASE ORAL at 09:09

## 2018-12-29 RX ADMIN — FUROSEMIDE 40 MG: 10 INJECTION, SOLUTION INTRAMUSCULAR; INTRAVENOUS at 09:09

## 2018-12-29 RX ADMIN — LISINOPRIL 5 MG: 5 TABLET ORAL at 10:31

## 2018-12-29 RX ADMIN — OXYCODONE HYDROCHLORIDE 10 MG: 5 TABLET ORAL at 10:17

## 2018-12-29 RX ADMIN — OXYCODONE HYDROCHLORIDE 10 MG: 5 TABLET ORAL at 03:48

## 2018-12-29 RX ADMIN — MUPIROCIN 10 APPLICATION: 20 OINTMENT TOPICAL at 09:09

## 2018-12-29 RX ADMIN — ENOXAPARIN SODIUM 40 MG: 40 INJECTION SUBCUTANEOUS at 17:24

## 2018-12-29 RX ADMIN — ASPIRIN 81 MG: 81 TABLET, DELAYED RELEASE ORAL at 09:09

## 2018-12-29 RX ADMIN — ACETAMINOPHEN 650 MG: 325 TABLET, FILM COATED ORAL at 03:49

## 2018-12-29 RX ADMIN — MUPIROCIN 10 APPLICATION: 20 OINTMENT TOPICAL at 21:06

## 2018-12-29 RX ADMIN — AMIODARONE HYDROCHLORIDE 400 MG: 200 TABLET ORAL at 21:06

## 2018-12-30 ENCOUNTER — APPOINTMENT (OUTPATIENT)
Dept: GENERAL RADIOLOGY | Facility: HOSPITAL | Age: 63
End: 2018-12-30

## 2018-12-30 LAB
ABO + RH BLD: NORMAL
ABO + RH BLD: NORMAL
ANION GAP SERPL CALCULATED.3IONS-SCNC: 10.5 MMOL/L
BH BB BLOOD EXPIRATION DATE: NORMAL
BH BB BLOOD EXPIRATION DATE: NORMAL
BH BB BLOOD TYPE BARCODE: 5100
BH BB BLOOD TYPE BARCODE: 5100
BH BB DISPENSE STATUS: NORMAL
BH BB DISPENSE STATUS: NORMAL
BH BB PRODUCT CODE: NORMAL
BH BB PRODUCT CODE: NORMAL
BH BB UNIT NUMBER: NORMAL
BH BB UNIT NUMBER: NORMAL
BUN BLD-MCNC: 27 MG/DL (ref 8–23)
BUN/CREAT SERPL: 23.5 (ref 7–25)
CALCIUM SPEC-SCNC: 8.5 MG/DL (ref 8.6–10.5)
CHLORIDE SERPL-SCNC: 105 MMOL/L (ref 98–107)
CO2 SERPL-SCNC: 24.5 MMOL/L (ref 22–29)
CREAT BLD-MCNC: 1.15 MG/DL (ref 0.76–1.27)
CROSSMATCH INTERPRETATION: NORMAL
CROSSMATCH INTERPRETATION: NORMAL
DEPRECATED RDW RBC AUTO: 46.4 FL (ref 37–54)
ERYTHROCYTE [DISTWIDTH] IN BLOOD BY AUTOMATED COUNT: 13.5 % (ref 11.5–14.5)
GFR SERPL CREATININE-BSD FRML MDRD: 65 ML/MIN/1.73
GLUCOSE BLD-MCNC: 101 MG/DL (ref 65–99)
GLUCOSE BLDC GLUCOMTR-MCNC: 96 MG/DL (ref 70–130)
GLUCOSE BLDC GLUCOMTR-MCNC: 97 MG/DL (ref 70–130)
GLUCOSE BLDC GLUCOMTR-MCNC: 99 MG/DL (ref 70–130)
GLUCOSE BLDC GLUCOMTR-MCNC: 99 MG/DL (ref 70–130)
HCT VFR BLD AUTO: 35 % (ref 40.4–52.2)
HGB BLD-MCNC: 10.9 G/DL (ref 13.7–17.6)
MCH RBC QN AUTO: 29.5 PG (ref 27–32.7)
MCHC RBC AUTO-ENTMCNC: 31.1 G/DL (ref 32.6–36.4)
MCV RBC AUTO: 94.6 FL (ref 79.8–96.2)
PLATELET # BLD AUTO: 160 10*3/MM3 (ref 140–500)
PMV BLD AUTO: 10 FL (ref 6–12)
POTASSIUM BLD-SCNC: 3.8 MMOL/L (ref 3.5–5.2)
RBC # BLD AUTO: 3.7 10*6/MM3 (ref 4.6–6)
SODIUM BLD-SCNC: 140 MMOL/L (ref 136–145)
UNIT  ABO: NORMAL
UNIT  ABO: NORMAL
UNIT  RH: NORMAL
UNIT  RH: NORMAL
WBC NRBC COR # BLD: 16.29 10*3/MM3 (ref 4.5–10.7)

## 2018-12-30 PROCEDURE — 85027 COMPLETE CBC AUTOMATED: CPT | Performed by: THORACIC SURGERY (CARDIOTHORACIC VASCULAR SURGERY)

## 2018-12-30 PROCEDURE — 93005 ELECTROCARDIOGRAM TRACING: CPT | Performed by: THORACIC SURGERY (CARDIOTHORACIC VASCULAR SURGERY)

## 2018-12-30 PROCEDURE — 71046 X-RAY EXAM CHEST 2 VIEWS: CPT

## 2018-12-30 PROCEDURE — 25010000002 FUROSEMIDE PER 20 MG: Performed by: NURSE PRACTITIONER

## 2018-12-30 PROCEDURE — 25010000002 ENOXAPARIN PER 10 MG: Performed by: THORACIC SURGERY (CARDIOTHORACIC VASCULAR SURGERY)

## 2018-12-30 PROCEDURE — 82962 GLUCOSE BLOOD TEST: CPT

## 2018-12-30 PROCEDURE — 80048 BASIC METABOLIC PNL TOTAL CA: CPT | Performed by: THORACIC SURGERY (CARDIOTHORACIC VASCULAR SURGERY)

## 2018-12-30 PROCEDURE — 93010 ELECTROCARDIOGRAM REPORT: CPT | Performed by: INTERNAL MEDICINE

## 2018-12-30 PROCEDURE — 99024 POSTOP FOLLOW-UP VISIT: CPT | Performed by: THORACIC SURGERY (CARDIOTHORACIC VASCULAR SURGERY)

## 2018-12-30 PROCEDURE — 97110 THERAPEUTIC EXERCISES: CPT

## 2018-12-30 RX ORDER — FUROSEMIDE 10 MG/ML
40 INJECTION INTRAMUSCULAR; INTRAVENOUS ONCE
Status: COMPLETED | OUTPATIENT
Start: 2018-12-30 | End: 2018-12-30

## 2018-12-30 RX ORDER — AMIODARONE HYDROCHLORIDE 200 MG/1
200 TABLET ORAL EVERY 12 HOURS SCHEDULED
Status: DISCONTINUED | OUTPATIENT
Start: 2018-12-30 | End: 2018-12-31

## 2018-12-30 RX ORDER — WARFARIN SODIUM 2 MG/1
2 TABLET ORAL
Status: DISCONTINUED | OUTPATIENT
Start: 2018-12-30 | End: 2019-01-02 | Stop reason: HOSPADM

## 2018-12-30 RX ORDER — FUROSEMIDE 40 MG/1
40 TABLET ORAL DAILY
Status: DISCONTINUED | OUTPATIENT
Start: 2018-12-31 | End: 2019-01-02 | Stop reason: HOSPADM

## 2018-12-30 RX ADMIN — ASPIRIN 81 MG: 81 TABLET, DELAYED RELEASE ORAL at 08:31

## 2018-12-30 RX ADMIN — MUPIROCIN 10 APPLICATION: 20 OINTMENT TOPICAL at 08:32

## 2018-12-30 RX ADMIN — METOPROLOL TARTRATE 25 MG: 25 TABLET ORAL at 20:26

## 2018-12-30 RX ADMIN — FUROSEMIDE 40 MG: 10 INJECTION, SOLUTION INTRAMUSCULAR; INTRAVENOUS at 08:31

## 2018-12-30 RX ADMIN — AMIODARONE HYDROCHLORIDE 200 MG: 200 TABLET ORAL at 13:48

## 2018-12-30 RX ADMIN — LISINOPRIL 5 MG: 5 TABLET ORAL at 11:55

## 2018-12-30 RX ADMIN — ENOXAPARIN SODIUM 40 MG: 40 INJECTION SUBCUTANEOUS at 17:23

## 2018-12-30 RX ADMIN — AMIODARONE HYDROCHLORIDE 200 MG: 200 TABLET ORAL at 20:26

## 2018-12-30 RX ADMIN — POTASSIUM CHLORIDE 20 MEQ: 750 CAPSULE, EXTENDED RELEASE ORAL at 08:32

## 2018-12-30 RX ADMIN — SENNOSIDES AND DOCUSATE SODIUM 2 TABLET: 8.6; 5 TABLET ORAL at 20:26

## 2018-12-30 RX ADMIN — HYDROCODONE BITARTRATE AND ACETAMINOPHEN 2 TABLET: 5; 325 TABLET ORAL at 18:57

## 2018-12-30 RX ADMIN — WARFARIN SODIUM 2 MG: 2 TABLET ORAL at 17:23

## 2018-12-30 RX ADMIN — CYCLOBENZAPRINE 10 MG: 10 TABLET, FILM COATED ORAL at 16:16

## 2018-12-30 RX ADMIN — METOPROLOL TARTRATE 12.5 MG: 25 TABLET ORAL at 08:32

## 2018-12-30 RX ADMIN — HYDROCODONE BITARTRATE AND ACETAMINOPHEN 2 TABLET: 5; 325 TABLET ORAL at 04:15

## 2018-12-30 RX ADMIN — ATORVASTATIN CALCIUM 40 MG: 20 TABLET, FILM COATED ORAL at 20:25

## 2018-12-30 RX ADMIN — MUPIROCIN 1 APPLICATION: 20 OINTMENT TOPICAL at 20:26

## 2018-12-30 RX ADMIN — HYDROCODONE BITARTRATE AND ACETAMINOPHEN 1 TABLET: 5; 325 TABLET ORAL at 13:53

## 2018-12-30 RX ADMIN — PANTOPRAZOLE SODIUM 40 MG: 40 TABLET, DELAYED RELEASE ORAL at 08:31

## 2018-12-30 RX ADMIN — HYDROCODONE BITARTRATE AND ACETAMINOPHEN 2 TABLET: 5; 325 TABLET ORAL at 09:58

## 2018-12-31 LAB
ANION GAP SERPL CALCULATED.3IONS-SCNC: 9 MMOL/L
BUN BLD-MCNC: 30 MG/DL (ref 8–23)
BUN/CREAT SERPL: 24.6 (ref 7–25)
CALCIUM SPEC-SCNC: 8.9 MG/DL (ref 8.6–10.5)
CHLORIDE SERPL-SCNC: 99 MMOL/L (ref 98–107)
CO2 SERPL-SCNC: 27 MMOL/L (ref 22–29)
CREAT BLD-MCNC: 1.22 MG/DL (ref 0.76–1.27)
DEPRECATED RDW RBC AUTO: 46 FL (ref 37–54)
ERYTHROCYTE [DISTWIDTH] IN BLOOD BY AUTOMATED COUNT: 13.4 % (ref 11.5–14.5)
GFR SERPL CREATININE-BSD FRML MDRD: 60 ML/MIN/1.73
GLUCOSE BLD-MCNC: 90 MG/DL (ref 65–99)
GLUCOSE BLDC GLUCOMTR-MCNC: 100 MG/DL (ref 70–130)
GLUCOSE BLDC GLUCOMTR-MCNC: 66 MG/DL (ref 70–130)
GLUCOSE BLDC GLUCOMTR-MCNC: 73 MG/DL (ref 70–130)
HCT VFR BLD AUTO: 33.7 % (ref 40.4–52.2)
HGB BLD-MCNC: 10.6 G/DL (ref 13.7–17.6)
INR PPP: 1.17 (ref 0.9–1.1)
MCH RBC QN AUTO: 29.3 PG (ref 27–32.7)
MCHC RBC AUTO-ENTMCNC: 31.5 G/DL (ref 32.6–36.4)
MCV RBC AUTO: 93.1 FL (ref 79.8–96.2)
PLATELET # BLD AUTO: 204 10*3/MM3 (ref 140–500)
PMV BLD AUTO: 9.9 FL (ref 6–12)
POTASSIUM BLD-SCNC: 3.5 MMOL/L (ref 3.5–5.2)
POTASSIUM BLD-SCNC: 3.9 MMOL/L (ref 3.5–5.2)
PROTHROMBIN TIME: 14.7 SECONDS (ref 11.7–14.2)
RBC # BLD AUTO: 3.62 10*6/MM3 (ref 4.6–6)
SODIUM BLD-SCNC: 135 MMOL/L (ref 136–145)
WBC NRBC COR # BLD: 11.42 10*3/MM3 (ref 4.5–10.7)

## 2018-12-31 PROCEDURE — 85027 COMPLETE CBC AUTOMATED: CPT | Performed by: THORACIC SURGERY (CARDIOTHORACIC VASCULAR SURGERY)

## 2018-12-31 PROCEDURE — 25010000002 ENOXAPARIN PER 10 MG: Performed by: THORACIC SURGERY (CARDIOTHORACIC VASCULAR SURGERY)

## 2018-12-31 PROCEDURE — 80048 BASIC METABOLIC PNL TOTAL CA: CPT | Performed by: THORACIC SURGERY (CARDIOTHORACIC VASCULAR SURGERY)

## 2018-12-31 PROCEDURE — 82962 GLUCOSE BLOOD TEST: CPT

## 2018-12-31 PROCEDURE — 97110 THERAPEUTIC EXERCISES: CPT

## 2018-12-31 PROCEDURE — 99255 IP/OBS CONSLTJ NEW/EST HI 80: CPT | Performed by: INTERNAL MEDICINE

## 2018-12-31 PROCEDURE — 84132 ASSAY OF SERUM POTASSIUM: CPT | Performed by: THORACIC SURGERY (CARDIOTHORACIC VASCULAR SURGERY)

## 2018-12-31 PROCEDURE — 85610 PROTHROMBIN TIME: CPT | Performed by: THORACIC SURGERY (CARDIOTHORACIC VASCULAR SURGERY)

## 2018-12-31 RX ADMIN — POTASSIUM CHLORIDE 40 MEQ: 750 CAPSULE, EXTENDED RELEASE ORAL at 06:08

## 2018-12-31 RX ADMIN — FUROSEMIDE 40 MG: 40 TABLET ORAL at 09:43

## 2018-12-31 RX ADMIN — HYDROCODONE BITARTRATE AND ACETAMINOPHEN 2 TABLET: 5; 325 TABLET ORAL at 12:54

## 2018-12-31 RX ADMIN — MUPIROCIN 10 APPLICATION: 20 OINTMENT TOPICAL at 21:13

## 2018-12-31 RX ADMIN — POTASSIUM CHLORIDE 40 MEQ: 750 CAPSULE, EXTENDED RELEASE ORAL at 09:43

## 2018-12-31 RX ADMIN — HYDROCODONE BITARTRATE AND ACETAMINOPHEN 2 TABLET: 5; 325 TABLET ORAL at 17:44

## 2018-12-31 RX ADMIN — PANTOPRAZOLE SODIUM 40 MG: 40 TABLET, DELAYED RELEASE ORAL at 09:43

## 2018-12-31 RX ADMIN — ENOXAPARIN SODIUM 40 MG: 40 INJECTION SUBCUTANEOUS at 17:44

## 2018-12-31 RX ADMIN — LISINOPRIL 5 MG: 5 TABLET ORAL at 09:43

## 2018-12-31 RX ADMIN — SENNOSIDES AND DOCUSATE SODIUM 2 TABLET: 8.6; 5 TABLET ORAL at 21:13

## 2018-12-31 RX ADMIN — MUPIROCIN 10 APPLICATION: 20 OINTMENT TOPICAL at 09:43

## 2018-12-31 RX ADMIN — WARFARIN SODIUM 2 MG: 2 TABLET ORAL at 17:44

## 2018-12-31 RX ADMIN — HYDROCODONE BITARTRATE AND ACETAMINOPHEN 2 TABLET: 5; 325 TABLET ORAL at 03:47

## 2018-12-31 RX ADMIN — ATORVASTATIN CALCIUM 40 MG: 20 TABLET, FILM COATED ORAL at 21:13

## 2018-12-31 RX ADMIN — HYDROCODONE BITARTRATE AND ACETAMINOPHEN 2 TABLET: 5; 325 TABLET ORAL at 22:27

## 2018-12-31 RX ADMIN — ASPIRIN 81 MG: 81 TABLET, DELAYED RELEASE ORAL at 09:43

## 2019-01-01 LAB
ANION GAP SERPL CALCULATED.3IONS-SCNC: 11.6 MMOL/L
BUN BLD-MCNC: 22 MG/DL (ref 8–23)
BUN/CREAT SERPL: 20.6 (ref 7–25)
CALCIUM SPEC-SCNC: 9.1 MG/DL (ref 8.6–10.5)
CHLORIDE SERPL-SCNC: 101 MMOL/L (ref 98–107)
CO2 SERPL-SCNC: 25.4 MMOL/L (ref 22–29)
CREAT BLD-MCNC: 1.07 MG/DL (ref 0.76–1.27)
GFR SERPL CREATININE-BSD FRML MDRD: 70 ML/MIN/1.73
GLUCOSE BLD-MCNC: 92 MG/DL (ref 65–99)
INR PPP: 1.12 (ref 0.9–1.1)
POTASSIUM BLD-SCNC: 3.7 MMOL/L (ref 3.5–5.2)
PROTHROMBIN TIME: 14.2 SECONDS (ref 11.7–14.2)
SODIUM BLD-SCNC: 138 MMOL/L (ref 136–145)

## 2019-01-01 PROCEDURE — 97110 THERAPEUTIC EXERCISES: CPT

## 2019-01-01 PROCEDURE — 80048 BASIC METABOLIC PNL TOTAL CA: CPT | Performed by: THORACIC SURGERY (CARDIOTHORACIC VASCULAR SURGERY)

## 2019-01-01 PROCEDURE — 85610 PROTHROMBIN TIME: CPT | Performed by: THORACIC SURGERY (CARDIOTHORACIC VASCULAR SURGERY)

## 2019-01-01 PROCEDURE — 25010000002 ENOXAPARIN PER 10 MG: Performed by: THORACIC SURGERY (CARDIOTHORACIC VASCULAR SURGERY)

## 2019-01-01 RX ORDER — BUMETANIDE 0.25 MG/ML
1 INJECTION INTRAMUSCULAR; INTRAVENOUS ONCE
Status: COMPLETED | OUTPATIENT
Start: 2019-01-01 | End: 2019-01-01

## 2019-01-01 RX ORDER — POTASSIUM CHLORIDE 750 MG/1
20 CAPSULE, EXTENDED RELEASE ORAL ONCE
Status: DISCONTINUED | OUTPATIENT
Start: 2019-01-01 | End: 2019-01-02 | Stop reason: HOSPADM

## 2019-01-01 RX ORDER — BUMETANIDE 0.25 MG/ML
0.5 INJECTION INTRAMUSCULAR; INTRAVENOUS ONCE
Status: DISCONTINUED | OUTPATIENT
Start: 2019-01-01 | End: 2019-01-01

## 2019-01-01 RX ADMIN — ENOXAPARIN SODIUM 40 MG: 40 INJECTION SUBCUTANEOUS at 17:58

## 2019-01-01 RX ADMIN — ASPIRIN 81 MG: 81 TABLET, DELAYED RELEASE ORAL at 09:21

## 2019-01-01 RX ADMIN — WARFARIN SODIUM 2 MG: 2 TABLET ORAL at 17:58

## 2019-01-01 RX ADMIN — HYDROCODONE BITARTRATE AND ACETAMINOPHEN 2 TABLET: 5; 325 TABLET ORAL at 17:58

## 2019-01-01 RX ADMIN — HYDROCODONE BITARTRATE AND ACETAMINOPHEN 2 TABLET: 5; 325 TABLET ORAL at 13:37

## 2019-01-01 RX ADMIN — SENNOSIDES AND DOCUSATE SODIUM 2 TABLET: 8.6; 5 TABLET ORAL at 20:20

## 2019-01-01 RX ADMIN — PANTOPRAZOLE SODIUM 40 MG: 40 TABLET, DELAYED RELEASE ORAL at 09:21

## 2019-01-01 RX ADMIN — MAGNESIUM HYDROXIDE 10 ML: 2400 SUSPENSION ORAL at 18:02

## 2019-01-01 RX ADMIN — HYDROCODONE BITARTRATE AND ACETAMINOPHEN 2 TABLET: 5; 325 TABLET ORAL at 03:41

## 2019-01-01 RX ADMIN — LISINOPRIL 5 MG: 5 TABLET ORAL at 09:21

## 2019-01-01 RX ADMIN — ATORVASTATIN CALCIUM 40 MG: 20 TABLET, FILM COATED ORAL at 20:20

## 2019-01-01 RX ADMIN — POTASSIUM CHLORIDE 20 MEQ: 750 CAPSULE, EXTENDED RELEASE ORAL at 09:21

## 2019-01-01 RX ADMIN — MUPIROCIN 10 APPLICATION: 20 OINTMENT TOPICAL at 09:21

## 2019-01-01 RX ADMIN — BUMETANIDE 1 MG: 0.25 INJECTION INTRAMUSCULAR; INTRAVENOUS at 12:00

## 2019-01-01 RX ADMIN — HYDROCODONE BITARTRATE AND ACETAMINOPHEN 2 TABLET: 5; 325 TABLET ORAL at 09:21

## 2019-01-01 RX ADMIN — MUPIROCIN 1 APPLICATION: 20 OINTMENT TOPICAL at 20:20

## 2019-01-01 NOTE — PLAN OF CARE
Problem: Patient Care Overview  Goal: Plan of Care Review  Outcome: Ongoing (interventions implemented as appropriate)   01/01/19 2706   Coping/Psychosocial   Plan of Care Reviewed With patient   Plan of Care Review   Progress improving   OTHER   Outcome Summary AV wires removed. VSS throughout shift. remains in junctional rhythm. coumadin continued. pain controlled with PO pain medication. will continue to monitor.      Goal: Individualization and Mutuality  Outcome: Ongoing (interventions implemented as appropriate)    Goal: Discharge Needs Assessment  Outcome: Ongoing (interventions implemented as appropriate)      Problem: Cardiac Surgery (Adult)  Goal: Signs and Symptoms of Listed Potential Problems Will be Absent, Minimized or Managed (Cardiac Surgery)  Outcome: Ongoing (interventions implemented as appropriate)    Goal: Anesthesia/Sedation Recovery  Outcome: Ongoing (interventions implemented as appropriate)      Problem: Cardiac: Heart Failure (Adult)  Goal: Signs and Symptoms of Listed Potential Problems Will be Absent, Minimized or Managed (Cardiac: Heart Failure)  Outcome: Ongoing (interventions implemented as appropriate)      Problem: Skin Injury Risk (Adult)  Goal: Identify Related Risk Factors and Signs and Symptoms  Outcome: Ongoing (interventions implemented as appropriate)    Goal: Skin Health and Integrity  Outcome: Ongoing (interventions implemented as appropriate)      Problem: Fall Risk (Adult)  Goal: Identify Related Risk Factors and Signs and Symptoms  Outcome: Ongoing (interventions implemented as appropriate)

## 2019-01-01 NOTE — PLAN OF CARE
Problem: Patient Care Overview  Goal: Plan of Care Review   01/01/19 0947   Coping/Psychosocial   Plan of Care Reviewed With patient   Plan of Care Review   Progress improving   OTHER   Outcome Summary Pt demos improved ambulation distance today, and continues to improve functional strength and mobility. Consider trial of steps tomorrow.

## 2019-01-01 NOTE — THERAPY TREATMENT NOTE
Acute Care - Physical Therapy Treatment Note  [facility]     Patient Name: Jw Lazcano  : 1955  MRN: [id number]  Today's Date: 2019  Onset of Illness/Injury or Date of Surgery: 18          Admit Date: 2018    Visit Dx:    ICD-10-CM ICD-9-CM   1. Generalized weakness R53.1 780.79   2. Mitral valve insufficiency, unspecified etiology I34.0 424.0     Patient Active Problem List   Diagnosis   • Non-rheumatic mitral regurgitation   • CKD (chronic kidney disease) stage 3, GFR 30-59 ml/min (CMS/[facility])   • Coronary artery disease involving native coronary artery of native heart without angina pectoris   • CHF (congestive heart failure), NYHA class III, acute on chronic, combined (CMS/HCC)   • Mitral valve insufficiency   • Mitral incompetence       Therapy Treatment    Rehabilitation Treatment Summary     Row Name 1934             Treatment Time/Intention    Discipline  physical therapist  -CA      Document Type  therapy note (daily note)  -CA      Subjective Information  no complaints  -CA      Mode of Treatment  physical therapy  -CA      Patient/Family Observations  UIC, NAD  -CA      Patient Effort  good  -CA      Existing Precautions/Restrictions  cardiac;fall;sternal  -CA      Recorded by [CA] Nicole Greenberg, PT 1992      Row Name 19             Cognitive Assessment/Intervention- PT/OT    Orientation Status (Cognition)  oriented x 4  -CA      Follows Commands (Cognition)  WFL  -CA      Recorded by [CA] Nicole Greenberg, PT 19      Row Name 1914             Bed Mobility Assessment/Treatment    Comment (Bed Mobility)  NT, UIC start of session, BR end of session  -CA      Recorded by [CA] Nicole Greenberg, PT 1900      Row Name 1984             Sit-Stand Transfer    Sit-Stand [place] (Transfers)  stand by assist;contact guard;verbal cues  -CA      Recorded by [CA] Nicole Greenberg, PT 1988      Row Name 19  0945             Stand-Sit Transfer    Stand-Sit [place] (Transfers)  stand by assist;verbal cues  -CA      Recorded by [CA] Nicole Greenberg, PT 01/01/19 0947      Row Name 01/01/19 0945             Gait/Stairs Assessment/Training    [place] Level (Gait)  contact guard;verbal cues  -CA      Distance in Feet (Gait)  250  -CA      Pattern (Gait)  step-through  -CA      Deviations/Abnormal Patterns (Gait)  base of support, narrow;esa decreased;gait speed decreased  -CA      Bilateral Gait Deviations  forward flexed posture  -CA      Recorded by [CA] Nicole Greenberg, PT 01/01/19 0947      Row Name 01/01/19 0945             Positioning and Restraints    Pre-Treatment Position  sitting in chair/recliner  -CA      Post Treatment Position  bathroom  -CA      Bathroom  sitting;call light within reach;encouraged to call for assist;with family/caregiver notified nsg aide  -CA      Recorded by [CA] Nicole Greenberg, PT 01/01/19 0947      Row Name 01/01/19 0945             Pain Scale: Numbers Pre/Post-Treatment    Pain Scale: Numbers, Pretreatment  4/10  -CA      Pain Location - Orientation  incisional  -CA      Pain Location  chest  -CA      Pain Intervention(s)  Repositioned;Ambulation/increased activity  -CA      Recorded by [CA] Nicole Greenberg, PT 01/01/19 0947      Row Name                Wound 12/27/18 0931 chest incision    Wound - Properties Group Date first assessed: 12/27/18 [SR] Time first assessed: 0931 [SR] Location: chest [SR] Type: incision [SR] Recorded by:  [SR] Fabienne Flores RN 12/27/18 0931    Row Name                Wound 12/27/18 0931 Left leg incision    Wound - Properties Group Date first assessed: 12/27/18 [SR] Time first assessed: 0931 [SR] Side: Left [SR] Location: leg [SR] Type: incision [SR] Recorded by:  [SR] Fabienne Flores RN 12/27/18 0931      User Key  (r) = Recorded By, (t) = Taken By, (c) = Cosigned By    Initials Name Effective Dates Discipline    SR Fabienne Flores RN 06/16/16 -   Nurse    Nicole Saldivar, PT 06/13/18 -  PT          Wound 12/27/18 0931 chest incision (Active)   Dressing Appearance open to air 1/1/2019  8:46 AM   Closure Approximated 1/1/2019  8:46 AM   Base dry;clean 1/1/2019  8:46 AM   Drainage Amount none 1/1/2019  8:46 AM   Dressing Care, Wound open to air 12/31/2018  8:00 PM       Wound 12/27/18 0931 Left leg incision (Active)   Dressing Appearance open to air 1/1/2019  8:46 AM   Closure Liquid skin adhesive 1/1/2019  8:46 AM   Base dry;pink 1/1/2019  8:46 AM   Drainage Amount none 1/1/2019  8:46 AM           Physical Therapy Education     Title: PT OT SLP Therapies (Done)     Topic: Physical Therapy (Done)     Point: Mobility training (Done)     Learning Progress Summary           Patient Acceptance, E, VU by CA at 1/1/2019  9:47 AM    Acceptance, E, VU by MA at 12/31/2018 10:19 AM    Acceptance, E, VU by MA at 12/31/2018 10:18 AM    Acceptance, E, VU by MA at 12/30/2018  9:50 AM    Acceptance, E,TB,D, VU,NR by  at 12/29/2018  3:55 PM    Acceptance, E,TB,D, VU,NR by  at 12/28/2018 10:00 AM                   Point: Home exercise program (Done)     Learning Progress Summary           Patient Acceptance, E, VU by CA at 1/1/2019  9:47 AM    Acceptance, E, VU by MA at 12/31/2018 10:19 AM    Acceptance, E,TB,D, VU,NR by  at 12/29/2018  3:55 PM    Acceptance, E,TB,D, VU,NR by  at 12/28/2018 10:00 AM                   Point: Body mechanics (Done)     Learning Progress Summary           Patient Acceptance, E, VU by CA at 1/1/2019  9:47 AM    Acceptance, E, VU by MA at 12/31/2018 10:19 AM    Acceptance, E, VU by MA at 12/31/2018 10:18 AM    Acceptance, E, VU by MA at 12/30/2018  9:50 AM    Acceptance, E,TB,D, VU,NR by  at 12/29/2018  3:55 PM    Acceptance, E,TB,D, VU,NR by  at 12/28/2018 10:00 AM                   Point: Precautions (Done)     Learning Progress Summary           Patient AcceptanceNOEMY VU by CA at 1/1/2019  9:47 AM    AcceptanceNOEMY VU by MA at  12/31/2018 10:19 AM    Acceptance, E, VU by MA at 12/31/2018 10:18 AM    Acceptance, E, VU by MA at 12/30/2018  9:50 AM    Acceptance, E,TB,D, VU,NR by  at 12/29/2018  3:55 PM    Acceptance, E,TB,D, VU,NR by  at 12/28/2018 10:00 AM                               User Key     Initials Effective Dates Name Provider Type Discipline     04/03/18 -  Jasmina Neely, PT Physical Therapist PT    MA 04/03/18 -  Mai Rodriguez, PT Physical Therapist PT    CA 06/13/18 -  Nicole Greenberg, PT Physical Therapist PT                PT Recommendation and Plan     Plan of Care Reviewed With: patient  Progress: improving  Outcome Summary: Pt natanael improved ambulation distance today, and continues to improve functional strength and mobility. Consider trial of steps tomorrow.  Outcome Measures     Row Name 01/01/19 0900 12/31/18 1000 12/30/18 0900       How much help from another person do you currently need...    Turning from your back to your side while in flat bed without using bedrails?  3  -CA  3  -MA  3  -MA    Moving from lying on back to sitting on the side of a flat bed without bedrails?  3  -CA  3  -MA  3  -MA    Moving to and from a bed to a chair (including a wheelchair)?  3  -CA  3  -MA  3  -MA    Standing up from a chair using your arms (e.g., wheelchair, bedside chair)?  3  -CA  3  -MA  3  -MA    Climbing 3-5 steps with a railing?  3  -CA  2  -MA  2  -MA    To walk in hospital room?  3  -CA  3  -MA  3  -MA    AM-PAC 6 Clicks Score  18  -CA  17  -MA  17  -MA       Functional Assessment    Outcome Measure Options  AM-PAC 6 Clicks Basic Mobility (PT)  -CA  AM-PAC 6 Clicks Basic Mobility (PT)  -MA  AM-PAC 6 Clicks Basic Mobility (PT)  -MA    Row Name 12/29/18 1500             How much help from another person do you currently need...    Turning from your back to your side while in flat bed without using bedrails?  3  -CH      Moving from lying on back to sitting on the side of a flat bed without bedrails?  3   -CH      Moving to and from a bed to a chair (including a wheelchair)?  3  -CH      Standing up from a chair using your arms (e.g., wheelchair, bedside chair)?  3  -CH      Climbing 3-5 steps with a railing?  1  -CH      To walk in hospital room?  3  -CH      AM-PAC 6 Clicks Score  16  -CH         Functional Assessment    Outcome Measure Options  AM-PAC 6 Clicks Basic Mobility (PT)  -CH        User Key  (r) = Recorded By, (t) = Taken By, (c) = Cosigned By    Initials Name Provider Type     Jasmina Neely, PT Physical Therapist    Mai Wang, PT Physical Therapist    Nicole Saldivar, PT Physical Therapist         Time Calculation:   PT Charges     Row Name 01/01/19 0948             Time Calculation    Start Time  0933  -CA      Stop Time  0945  -CA      Time Calculation (min)  12 min  -CA      PT Received On  01/01/19  -CA      PT - Next Appointment  01/02/19  -CA         Time Calculation- PT    Total Timed Code Minutes- PT  12 minute(s)  -CA        User Key  (r) = Recorded By, (t) = Taken By, (c) = Cosigned By    Initials Name Provider Type    Nicole Saldivar, PT Physical Therapist        Therapy Suggested Charges     Code   Minutes Charges    None           Therapy Charges for Today     Code Description Service Date Service Provider Modifiers Qty    [id number]  PT THER PROC EA 15 MIN 1/1/2019 Nicole Greenberg, PT GP 1          PT G-Codes  Outcome Measure Options: AM-PAC 6 Clicks Basic Mobility (PT)  AM-PAC 6 Clicks Score: 18    Nicole Greenberg, RINKU  1/1/2019

## 2019-01-01 NOTE — PROGRESS NOTES
LOS: 6 days   Patient Care Team:  Erick Jessica MD as PCP - General (Family Medicine)    Chief Complaint: post op    Subjective:  Symptoms:  No shortness of breath.    Diet:  Adequate intake.    Activity level: Returning to normal.    Pain:  He reports no pain.          Vital Signs  Temp:  [97.4 °F (36.3 °C)-98.4 °F (36.9 °C)] 98.4 °F (36.9 °C)  Heart Rate:  [57-72] 57  Resp:  [16] 16  BP: (116-133)/(62-91) 123/74  Body mass index is 28.63 kg/m².    Intake/Output Summary (Last 24 hours) at 1/1/2019 0841  Last data filed at 1/1/2019 0838  Gross per 24 hour   Intake 598 ml   Output 1450 ml   Net -852 ml     I/O this shift:  In: 240 [P.O.:240]  Out: 275 [Urine:275]            12/29/18  0703 12/30/18  0500 01/01/19  0647   Weight: 97.5 kg (215 lb) 98.5 kg (217 lb 3.2 oz) 98.4 kg (217 lb)         Objective    Results Review:        WBC WBC   Date Value Ref Range Status   12/31/2018 11.42 (H) 4.50 - 10.70 10*3/mm3 Final   12/30/2018 16.29 (H) 4.50 - 10.70 10*3/mm3 Final      HGB Hemoglobin   Date Value Ref Range Status   12/31/2018 10.6 (L) 13.7 - 17.6 g/dL Final   12/30/2018 10.9 (L) 13.7 - 17.6 g/dL Final      HCT Hematocrit   Date Value Ref Range Status   12/31/2018 33.7 (L) 40.4 - 52.2 % Final   12/30/2018 35.0 (L) 40.4 - 52.2 % Final      Platelets Platelets   Date Value Ref Range Status   12/31/2018 204 140 - 500 10*3/mm3 Final   12/30/2018 160 140 - 500 10*3/mm3 Final        PT/INR:    Protime   Date Value Ref Range Status   01/01/2019 14.2 11.7 - 14.2 Seconds Final   12/31/2018 14.7 (H) 11.7 - 14.2 Seconds Final   /  INR   Date Value Ref Range Status   01/01/2019 1.12 (H) 0.90 - 1.10 Final   12/31/2018 1.17 (H) 0.90 - 1.10 Final       Sodium Sodium   Date Value Ref Range Status   01/01/2019 138 136 - 145 mmol/L Final   12/31/2018 135 (L) 136 - 145 mmol/L Final   12/30/2018 140 136 - 145 mmol/L Final      Potassium Potassium   Date Value Ref Range Status   01/01/2019 3.7 3.5 - 5.2 mmol/L Final   12/31/2018  3.9 3.5 - 5.2 mmol/L Final   12/31/2018 3.5 3.5 - 5.2 mmol/L Final   12/30/2018 3.8 3.5 - 5.2 mmol/L Final      Chloride Chloride   Date Value Ref Range Status   01/01/2019 101 98 - 107 mmol/L Final   12/31/2018 99 98 - 107 mmol/L Final   12/30/2018 105 98 - 107 mmol/L Final      Bicarbonate CO2   Date Value Ref Range Status   01/01/2019 25.4 22.0 - 29.0 mmol/L Final   12/31/2018 27.0 22.0 - 29.0 mmol/L Final   12/30/2018 24.5 22.0 - 29.0 mmol/L Final      BUN BUN   Date Value Ref Range Status   01/01/2019 22 8 - 23 mg/dL Final   12/31/2018 30 (H) 8 - 23 mg/dL Final   12/30/2018 27 (H) 8 - 23 mg/dL Final      Creatinine Creatinine   Date Value Ref Range Status   01/01/2019 1.07 0.76 - 1.27 mg/dL Final   12/31/2018 1.22 0.76 - 1.27 mg/dL Final   12/30/2018 1.15 0.76 - 1.27 mg/dL Final      Calcium Calcium   Date Value Ref Range Status   01/01/2019 9.1 8.6 - 10.5 mg/dL Final   12/31/2018 8.9 8.6 - 10.5 mg/dL Final   12/30/2018 8.5 (L) 8.6 - 10.5 mg/dL Final      Magnesium No results found for: MG         aspirin 81 mg Oral Daily   atorvastatin 40 mg Oral Nightly   enoxaparin 40 mg Subcutaneous Daily   furosemide 40 mg Oral Daily   lisinopril 5 mg Oral Q24H   mupirocin  Each Nare BID   pantoprazole 40 mg Oral Daily   potassium chloride 20 mEq Oral Daily   sennosides-docusate sodium 2 tablet Oral Nightly   warfarin 2 mg Oral Daily       Sodium chloride 30 mL/hr   Sodium chloride 30 mL/hr           Patient Active Problem List   Diagnosis Code   • Non-rheumatic mitral regurgitation I34.0   • CKD (chronic kidney disease) stage 3, GFR 30-59 ml/min (CMS/HCC) N18.3   • Coronary artery disease involving native coronary artery of native heart without angina pectoris I25.10   • CHF (congestive heart failure), NYHA class III, acute on chronic, combined (CMS/[facility]) I50.43   • Mitral valve insufficiency I34.0   • Mitral incompetence I34.0       Assessment & Plan    -Severe mitral incompetence-s/p Mitral valve repair, CABGx1, left svg  EVH- POD#5 [place]  -CAD  -Dilated cardiomyopathy, EF 50%  -Moderate pulmonary hypertension  -CHF class III, started lisinopril today  -Post op anemia- expected acute blood loss  -leukocytosis- probable reactive    EP consulted for junctional rhythm, they are hopeful his sinus node will recover. Holding beta blocker and amiodarone.  Continue with coumadin.  Looks great.  Once rhythm improves he will be ready for discharge.    Karin Garcia, ZULMA  01/01/19  8:41 AM

## 2019-01-02 ENCOUNTER — APPOINTMENT (OUTPATIENT)
Dept: CARDIOLOGY | Facility: HOSPITAL | Age: 64
End: 2019-01-02

## 2019-01-02 VITALS
BODY MASS INDEX: 28.63 KG/M2 | TEMPERATURE: 97.9 F | SYSTOLIC BLOOD PRESSURE: 112 MMHG | DIASTOLIC BLOOD PRESSURE: 94 MMHG | RESPIRATION RATE: 18 BRPM | WEIGHT: 216 LBS | HEART RATE: 58 BPM | HEIGHT: 73 IN | OXYGEN SATURATION: 96 %

## 2019-01-02 LAB
ANION GAP SERPL CALCULATED.3IONS-SCNC: 13.5 MMOL/L
BASOPHILS # BLD AUTO: 0.05 10*3/MM3 (ref 0–0.2)
BASOPHILS NFR BLD AUTO: 0.4 % (ref 0–1.5)
BUN BLD-MCNC: 21 MG/DL (ref 8–23)
BUN/CREAT SERPL: 16.9 (ref 7–25)
CALCIUM SPEC-SCNC: 9.2 MG/DL (ref 8.6–10.5)
CHLORIDE SERPL-SCNC: 100 MMOL/L (ref 98–107)
CO2 SERPL-SCNC: 25.5 MMOL/L (ref 22–29)
CREAT BLD-MCNC: 1.24 MG/DL (ref 0.76–1.27)
DEPRECATED RDW RBC AUTO: 45.6 FL (ref 37–54)
EOSINOPHIL # BLD AUTO: 0.57 10*3/MM3 (ref 0–0.7)
EOSINOPHIL NFR BLD AUTO: 4.6 % (ref 0.3–6.2)
ERYTHROCYTE [DISTWIDTH] IN BLOOD BY AUTOMATED COUNT: 13.5 % (ref 11.5–14.5)
GFR SERPL CREATININE-BSD FRML MDRD: 59 ML/MIN/1.73
GLUCOSE BLD-MCNC: 98 MG/DL (ref 65–99)
HCT VFR BLD AUTO: 39.1 % (ref 40.4–52.2)
HGB BLD-MCNC: 12.4 G/DL (ref 13.7–17.6)
IMM GRANULOCYTES # BLD AUTO: 0.09 10*3/MM3 (ref 0–0.03)
IMM GRANULOCYTES NFR BLD AUTO: 0.7 % (ref 0–0.5)
INR PPP: 1.19 (ref 0.9–1.1)
LYMPHOCYTES # BLD AUTO: 2.22 10*3/MM3 (ref 0.9–4.8)
LYMPHOCYTES NFR BLD AUTO: 17.7 % (ref 19.6–45.3)
MCH RBC QN AUTO: 29.5 PG (ref 27–32.7)
MCHC RBC AUTO-ENTMCNC: 31.7 G/DL (ref 32.6–36.4)
MCV RBC AUTO: 93.1 FL (ref 79.8–96.2)
MONOCYTES # BLD AUTO: 1.31 10*3/MM3 (ref 0.2–1.2)
MONOCYTES NFR BLD AUTO: 10.5 % (ref 5–12)
NEUTROPHILS # BLD AUTO: 8.28 10*3/MM3 (ref 1.9–8.1)
NEUTROPHILS NFR BLD AUTO: 66.1 % (ref 42.7–76)
PLATELET # BLD AUTO: 339 10*3/MM3 (ref 140–500)
PMV BLD AUTO: 9.3 FL (ref 6–12)
POTASSIUM BLD-SCNC: 3.7 MMOL/L (ref 3.5–5.2)
PROTHROMBIN TIME: 14.9 SECONDS (ref 11.7–14.2)
RBC # BLD AUTO: 4.2 10*6/MM3 (ref 4.6–6)
SODIUM BLD-SCNC: 139 MMOL/L (ref 136–145)
WBC NRBC COR # BLD: 12.52 10*3/MM3 (ref 4.5–10.7)

## 2019-01-02 PROCEDURE — 99232 SBSQ HOSP IP/OBS MODERATE 35: CPT | Performed by: NURSE PRACTITIONER

## 2019-01-02 PROCEDURE — 94799 UNLISTED PULMONARY SVC/PX: CPT

## 2019-01-02 PROCEDURE — 85610 PROTHROMBIN TIME: CPT | Performed by: THORACIC SURGERY (CARDIOTHORACIC VASCULAR SURGERY)

## 2019-01-02 PROCEDURE — 97110 THERAPEUTIC EXERCISES: CPT

## 2019-01-02 PROCEDURE — 25010000002 ONDANSETRON PER 1 MG: Performed by: THORACIC SURGERY (CARDIOTHORACIC VASCULAR SURGERY)

## 2019-01-02 PROCEDURE — 85025 COMPLETE CBC W/AUTO DIFF WBC: CPT | Performed by: NURSE PRACTITIONER

## 2019-01-02 PROCEDURE — 80048 BASIC METABOLIC PNL TOTAL CA: CPT | Performed by: THORACIC SURGERY (CARDIOTHORACIC VASCULAR SURGERY)

## 2019-01-02 PROCEDURE — 0296T HC EXT ECG > 48HR TO 21 DAY RCRD W/CONECT INTL RCRD: CPT

## 2019-01-02 RX ORDER — ASPIRIN 81 MG/1
81 TABLET ORAL DAILY
Qty: 90 TABLET | Refills: 0 | Status: SHIPPED | OUTPATIENT
Start: 2019-01-03 | End: 2019-04-03

## 2019-01-02 RX ORDER — LISINOPRIL 5 MG/1
5 TABLET ORAL
Qty: 30 TABLET | Refills: 3 | Status: SHIPPED | OUTPATIENT
Start: 2019-01-03 | End: 2021-12-08 | Stop reason: ALTCHOICE

## 2019-01-02 RX ORDER — FUROSEMIDE 40 MG/1
40 TABLET ORAL DAILY
Qty: 30 TABLET | Refills: 0 | Status: SHIPPED | OUTPATIENT
Start: 2019-01-03 | End: 2021-12-08 | Stop reason: ALTCHOICE

## 2019-01-02 RX ORDER — CYCLOBENZAPRINE HCL 10 MG
10 TABLET ORAL EVERY 8 HOURS PRN
Qty: 90 TABLET | Refills: 0 | Status: SHIPPED | OUTPATIENT
Start: 2019-01-02 | End: 2019-02-14

## 2019-01-02 RX ORDER — HYDROCODONE BITARTRATE AND ACETAMINOPHEN 5; 325 MG/1; MG/1
1 TABLET ORAL EVERY 4 HOURS PRN
Qty: 60 TABLET | Refills: 0 | Status: SHIPPED | OUTPATIENT
Start: 2019-01-02 | End: 2019-01-06

## 2019-01-02 RX ORDER — POTASSIUM CHLORIDE 750 MG/1
20 CAPSULE, EXTENDED RELEASE ORAL DAILY
Qty: 30 CAPSULE | Refills: 0 | Status: SHIPPED | OUTPATIENT
Start: 2019-01-03 | End: 2019-02-14

## 2019-01-02 RX ORDER — PANTOPRAZOLE SODIUM 40 MG/1
40 TABLET, DELAYED RELEASE ORAL DAILY
Qty: 30 TABLET | Refills: 3 | Status: SHIPPED | OUTPATIENT
Start: 2019-01-03 | End: 2019-02-14

## 2019-01-02 RX ORDER — WARFARIN SODIUM 2 MG/1
TABLET ORAL
Qty: 30 TABLET | Refills: 2 | Status: SHIPPED | OUTPATIENT
Start: 2019-01-02 | End: 2019-02-14

## 2019-01-02 RX ADMIN — FUROSEMIDE 40 MG: 40 TABLET ORAL at 08:47

## 2019-01-02 RX ADMIN — ASPIRIN 81 MG: 81 TABLET, DELAYED RELEASE ORAL at 08:47

## 2019-01-02 RX ADMIN — LISINOPRIL 5 MG: 5 TABLET ORAL at 08:47

## 2019-01-02 RX ADMIN — POTASSIUM CHLORIDE 20 MEQ: 750 CAPSULE, EXTENDED RELEASE ORAL at 08:46

## 2019-01-02 RX ADMIN — PANTOPRAZOLE SODIUM 40 MG: 40 TABLET, DELAYED RELEASE ORAL at 08:47

## 2019-01-02 RX ADMIN — HYDROCODONE BITARTRATE AND ACETAMINOPHEN 1 TABLET: 5; 325 TABLET ORAL at 07:17

## 2019-01-02 RX ADMIN — ONDANSETRON HYDROCHLORIDE 4 MG: 2 SOLUTION INTRAMUSCULAR; INTRAVENOUS at 03:25

## 2019-01-02 RX ADMIN — MUPIROCIN 10 APPLICATION: 20 OINTMENT TOPICAL at 08:47

## 2019-01-02 NOTE — DISCHARGE SUMMARY
Date of Admission:   Date of Discharge:  1/2/2019    Discharge Diagnosis:   -Severe mitral incompetence-s/p Mitral valve repair, CABGx1, left svg EVH-   -CAD  -Dilated cardiomyopathy, EF 50%  -Moderate pulmonary hypertension  -CHF class III, started lisinopril today  -Post op anemia- expected acute blood loss  -leukocytosis  -post op junctional bradycardia        Presenting Problem/History of Present Illness  Mitral incompetence [I34.0]     Hospital Course  Patient is a 63 y.o. male presented for mitral valve repair and CABGx1 by Dr. Lobo on 12/27.  Post operatively he did well.  We were unable to start a beta blocker due to his post operatively junctional rhythm, he briefly went into controlled atrial fibrillation however converted after a short time.  We consulted EP for their advice on his junctional rhythm.  Due to his rhythm changes Dr. Lobo would like him to be on coumadin for a few months to decrease the risk for valve thrombosis.   He was asymptomatic from this, blood pressure maintained, no dizziness or light headedness.   Dr. Cheng is hopeful that his sinus node will return.  On post op day #6 he was deemed ready for discharge home with home health. He will be sent home with a holter monitor and will need to follow up with Dr. Cheng in 2 weeks.  He will need to follow up with Dr. Bhagat in 2 weeks and his PCP in 1 week.  Post op appointment with Dr. Lobo on 2/6 at 11.  Sternal precautions reviewed.  Signs and symptoms of sternal instability and infection reviewed.  Patient was instructed to call with any and all questions or concerns.     Procedures Performed  Procedure(s):  REINALDO STERNOTOMY CORONARY ARTERY BYPASS GRAFT TIMES 1 USING LEFT INTERNAL MAMMARY ARTERY AND LEFT GREATER SAPHENOUS VEIN GRAFT PER ENDOSCOPIC VEIN HARVESTING, MITRAL VALVE REPAIR AND PRP       Consults:   Consults     Date and Time Order Name Status Description    12/31/2018 0905 Cardiac Electrophysiologist Inpatient Consult  Completed           Pertinent Test Results:    Lab Results   Component Value Date    WBC 12.52 (H) 01/02/2019    HGB 12.4 (L) 01/02/2019    HCT 39.1 (L) 01/02/2019    MCV 93.1 01/02/2019     01/02/2019      Lab Results   Component Value Date    GLUCOSE 98 01/02/2019    CALCIUM 9.2 01/02/2019     01/02/2019    K 3.7 01/02/2019    CO2 25.5 01/02/2019     01/02/2019    BUN 21 01/02/2019    CREATININE 1.24 01/02/2019    EGFRIFNONA 59 (L) 01/02/2019    BCR 16.9 01/02/2019    ANIONGAP 13.5 01/02/2019     Lab Results   Component Value Date    INR 1.19 (H) 01/02/2019    PROTIME 14.9 (H) 01/02/2019         Condition on Discharge: good    Vital Signs  Temp:  [97.6 °F (36.4 °C)-98.4 °F (36.9 °C)] 97.9 °F (36.6 °C)  Heart Rate:  [57-61] 58  Resp:  [18] 18  BP: (112-127)/(78-94) 112/94      Discharge Disposition  Home-Health Care c    Discharge Medications     Discharge Medications      New Medications      Instructions Start Date   aspirin 81 MG EC tablet   81 mg, Oral, Daily      cyclobenzaprine 10 MG tablet  Commonly known as:  FLEXERIL   10 mg, Oral, Every 8 Hours PRN      HYDROcodone-acetaminophen 5-325 MG per tablet  Commonly known as:  NORCO   1 tablet, Oral, Every 4 Hours PRN      lisinopril 5 MG tablet  Commonly known as:  PRINIVIL,ZESTRIL   5 mg, Oral, Every 24 Hours Scheduled      pantoprazole 40 MG EC tablet  Commonly known as:  PROTONIX   40 mg, Oral, Daily      potassium chloride 10 MEQ CR capsule  Commonly known as:  MICRO-K   20 mEq, Oral, Daily      warfarin 2 MG tablet  Commonly known as:  COUMADIN   1 tablet daily         Changes to Medications      Instructions Start Date   furosemide 40 MG tablet  Commonly known as:  LASIX  What changed:    · medication strength  · how much to take  · when to take this   40 mg, Oral, Daily         Continue These Medications      Instructions Start Date   atorvastatin 40 MG tablet  Commonly known as:  LIPITOR   40 mg, Oral, Daily             Discharge  Diet: heart healthy    Activity at Discharge:   Activity Instructions     Discharge Activity Restrictions      1) No driving for 2 weeks and no longer taking narcotics, ride in the back seat for 2 weeks.   2) May shower, no tub bathing, no immersion in pools or hot tubs   3) Do not lift / push / pull more then 10 lbs.  4) Walk at least 10 minutes at least 3 times daily          Follow-up Appointments  Future Appointments   Date Time Provider Department Center   2/6/2019 11:15 AM Miguel Lobo MD [facility] None     Additional Instructions for the Follow-ups that You Need to Schedule     Call MD With Problems / Concerns   As directed      Instructions:  Call office at [phone] for any drainage, increased redness, or fever over 100.5    Order Comments:  Instructions:  Call office at [phone] for any drainage, increased redness, or fever over 100.5          Discharge Follow-up with PCP   As directed       Currently Documented PCP:    Erick Jessica MD    PCP Phone Number:    [phone]     Follow Up Details:  in 1 week         Discharge Follow-up with Specialty: Dr. Murphy; 2 Weeks   As directed      Specialty:  Dr. Murphy    Follow Up:  2 Weeks    Follow Up Details:  bring all prescription bottles to appointment, call for appointment         Discharge Follow-up with Specified Provider: Cardiologist; 2 Weeks   As directed      To:  Cardiologist    Follow Up:  2 Weeks    Follow Up Details:  call for appointment, bring all medication bottles to appointment         Discharge Follow-up with Specified Provider: Dr. Lobo   As directed      To:  Dr. Lobo    Follow Up Details:  on 2/6 at 11 bring all current medications to appointment         Referral to Home Health   As directed      Draw INR on 1/7 and call results to Dr. Bhagat's office for management [phone]    Order Comments:  Draw INR on 1/7 and call results to Dr. Bhagat's office for management [phone]     Face to Face Visit Date:   1/2/2019    Follow-up Provider for Plan of Care?:  I will be treating the patient on an ongoing basis.  Please send me the Plan of Care for signature.    Follow-up Provider:  JUAN PINEDO [[id number]]    Reason/Clinical Findings:  post op open heart    Describe mobility limitations that make leaving home difficult:  weakness    Nursing/Therapeutic Services Requested:  Skilled Nursing    Skilled nursing orders:  Post CABG care    Frequency:  1 Week 1         Additional information on Labs and Follow-ups:      Please follow up with [facility] in 4 weeks when discharged. [phone]                    Test Results Pending at Discharge       ZULMA Mccoy  01/02/19  12:47 PM

## 2019-01-02 NOTE — PROGRESS NOTES
Adult Nutrition  Assessment/PES    Patient Name:  Jw Lazcano  YOB: 1955  MRN: [id number]  Admit Date:  12/26/2018    Assessment Date:  1/2/2019    Comments:  LOS day 7 screen; he is post-op day 5 from CABG and valve repair. Intake adequate. Wt stable.     Reason for Assessment     Row Name 01/02/19 1024          Reason for Assessment    Reason For Assessment  other (see comments) LOS day 7     Diagnosis  cardiac disease     Identified At Risk by Screening Criteria  no indicators present         Nutrition/Diet History     Row Name 01/02/19 1025          Nutrition/Diet History    Typical Food/Fluid Intake  Intake relatively good         Anthropometrics     Row Name 01/02/19 1025          Admit Weight    Admit Weight  97.5 kg (215 lb)        Body Mass Index (BMI)    BMI Assessment  BMI 25-29.9: overweight         Labs/Tests/Procedures/Meds     Row Name 01/02/19 1025          Labs/Procedures/Meds    Lab Results Reviewed  reviewed     Lab Results Comments  wnl        Diagnostic Tests/Procedures    Diagnostic Test/Procedure Reviewed  reviewed     Diagnostic Test/Procedures Comments  POD 5 CABG and MVr        Medications    Pertinent Medications Reviewed  reviewed     Pertinent Medications Comments  diuretic, coumadin, ppi         Physical Findings     Row Name 01/02/19 1025          Physical Findings    Skin  other (see comments) leg & chest incisions           Nutrition Prescription Ordered     Row Name 01/02/19 1026          Nutrition Prescription PO    Common Modifiers  Cardiac         Evaluation of Received Nutrient/Fluid Intake     Row Name 01/02/19 1026          PO Evaluation    Number of Days PO Intake Evaluated  2 days     % PO Intake  50-75%               Problem/Interventions:  Problem 1     Row Name 01/02/19 1026          Nutrition Diagnoses Problem 1    Problem 1  Nutrition Appropriate for Condition at this Time     Etiology (related to)  MNT for Treatment/Condition                  Intervention Goal     Row Name 01/02/19 1027          Intervention Goal    General  Maintain nutrition     PO  Maintain intake         Nutrition Intervention     Row Name 01/02/19 1027          Nutrition Intervention    RD/Tech Action  Interview for preference;Encourage intake;Follow Tx progress           Education/Evaluation     Row Name 01/02/19 1027          Education    Education  No discharge needs identified at this time        Monitor/Evaluation    Monitor  Per protocol           Electronically signed by:  Karin Ackerman RD  01/02/19 10:27 AM

## 2019-01-02 NOTE — PROGRESS NOTES
Electrophysiology Follow-Up Note      Patient Name: Jw Lazcano  Age/Sex: 63 y.o. male  : 1955  MRN: [id number]      Day of Service: 19       Chief Complaint/Follow-up: Post op PAF and junctional rhythm/bradycardia    S: Incisional soreness but otherwise doing okay.       Temp:  [97.6 °F (36.4 °C)-98.7 °F (37.1 °C)] 97.6 °F (36.4 °C)  Heart Rate:  [53-61] 57  Resp:  [18] 18  BP: (114-130)/(78-89) 114/80     PHYSICAL EXAM:    General Appearance: No acute distress, well developed and well nourished.   Eyes: Conjunctiva and lids: No erythema, swelling, or discharge. Sclera non-icteric.   HENT: Atraumatic, normocephalic. External eyes, ears, and nose normal.   Respiratory: No signs of respiratory distress. Respiration rhythm and depth normal.   Clear to auscultation. No rales, crackles, rhonchi, or wheezing auscultated.   Cardiovascular:  Jugular Venous Pressure: Normal  Heart Rate and Rhythm: Normal, Heart Sounds: Normal S1 and S2. No S3 or S4 noted.  Murmurs: No murmurs noted. No rubs, thrills, or gallops.   Arterial Pulses: Posterior tibialis and dorsalis pedis pulses normal.   Lower Extremities: No edema noted.  Gastrointestinal:  Abdomen soft, non-distended, non-tender.  Musculoskeletal: Normal movement of extremities  Skin: Warm and dry.   Psychiatric: Patient alert and oriented to person, place, and time. Speech and behavior appropriate. Normal mood and affect.       ECG/TELE:                         Results from last 7 days   Lab Units  19   0335  19   0602  18   1354  18   0324   SODIUM mmol/L  139  138   --   135*   POTASSIUM mmol/L  3.7  3.7  3.9  3.5   CHLORIDE mmol/L  100  101   --   99   CO2 mmol/L  25.5  25.4   --   27.0   BUN mg/dL  21  22   --   30*   CREATININE mg/dL  1.24  1.07   --   1.22   GLUCOSE mg/dL  98  92   --   90   CALCIUM mg/dL  9.2  9.1   --   8.9     Results from last 7 days   Lab Units  19   0335  18   0324  18   0344    WBC 10*3/mm3  12.52*  11.42*  16.29*   HEMOGLOBIN g/dL  12.4*  10.6*  10.9*   HEMATOCRIT %  39.1*  33.7*  35.0*   PLATELETS 10*3/mm3  339  204  160     Results from last 7 days   Lab Units  01/02/19   0336  01/01/19   0602  12/31/18   0324   INR   1.19*  1.12*  1.17*                   Current Medications:   Scheduled Meds:  aspirin 81 mg Oral Daily   atorvastatin 40 mg Oral Nightly   enoxaparin 40 mg Subcutaneous Daily   furosemide 40 mg Oral Daily   lisinopril 5 mg Oral Q24H   mupirocin  Each Nare BID   pantoprazole 40 mg Oral Daily   potassium chloride 20 mEq Oral Daily   potassium chloride 20 mEq Oral Once   sennosides-docusate sodium 2 tablet Oral Nightly   warfarin 2 mg Oral Daily           Mitral valve insufficiency    Mitral incompetence       Plan:     -POD#5 MV repair and CABG x 1  -EF 50%  -Post op PAF, was put on amio and BB>junctional rhythm, HR 50's>amio and BB stopped, continues with junctional rhythm, hemodynamically stable, on warfarin    Dr. Murphy and I saw Mr. Lazcano. Rhythm continues to be mostly junctional bradycardia, HR 50's but has had times of sinus. Recommend staying off of BB and amio for now, continue AC and send home with a Luiso and see how he does. If continues to be junctional and/or has symptomatic bradycardia then can consider pacemaker. Follow up with Dr. Murphy in 4-6 weeks.     Yadira Carlson, ZULMA  01/02/19  11:03 AM

## 2019-01-02 NOTE — PLAN OF CARE
Problem: Patient Care Overview  Goal: Plan of Care Review  Outcome: Ongoing (interventions implemented as appropriate)   01/02/19 5018   Coping/Psychosocial   Plan of Care Reviewed With patient;spouse   Plan of Care Review   Progress improving   OTHER   Outcome Summary Pt agreeable to PT, just c/o stiffness. He is ambulating well with CGA/SBA. He was also able to negotiate a flight of steps today without difficulty. He is hopeful to go home today or tomorrow. Will continue to progress as tolerated.

## 2019-01-02 NOTE — THERAPY TREATMENT NOTE
Acute Care - Physical Therapy Treatment Note  [facility]     Patient Name: Jw Lazcano  : 1955  MRN: [id number]  Today's Date: 2019  Onset of Illness/Injury or Date of Surgery: 18          Admit Date: 2018    Visit Dx:    ICD-10-CM ICD-9-CM   1. Generalized weakness R53.1 780.79   2. Mitral valve insufficiency, unspecified etiology I34.0 424.0     Patient Active Problem List   Diagnosis   • Non-rheumatic mitral regurgitation   • CKD (chronic kidney disease) stage 3, GFR 30-59 ml/min (CMS/[facility])   • Coronary artery disease involving native coronary artery of native heart without angina pectoris   • CHF (congestive heart failure), NYHA class III, acute on chronic, combined (CMS/HCC)   • Mitral valve insufficiency   • Mitral incompetence       Therapy Treatment    Rehabilitation Treatment Summary     Row Name 19 0855             Treatment Time/Intention    Discipline  physical therapist  -EJ      Document Type  therapy note (daily note)  -EJ      Subjective Information  -- c/o stiffness  -EJ      Mode of Treatment  physical therapy  -EJ      Patient/Family Observations  up in chair, no acute distress  -EJ      Patient Effort  excellent  -EJ      Existing Precautions/Restrictions  fall;cardiac  -EJ      Recorded by [EJ] La Smith, PT 19      Row Name 19 9944             Cognitive Assessment/Intervention- PT/OT    Personal Safety Interventions  fall prevention program maintained;nonskid shoes/slippers when out of bed;supervised activity  -EJ      Recorded by [EJ] La Smith, PT 19      Row Name 19 0855             Bed Mobility Assessment/Treatment    Supine-Sit [place] (Bed Mobility)  not tested  -EJ      Sit-Supine [place] (Bed Mobility)  not tested  -EJ      Comment (Bed Mobility)  up in chair, then sitting EOB  -EJ      Recorded by [EJ] La Smith, PT 19      Row Name 1955             Sit-Stand  Transfer    Sit-Stand [place] (Transfers)  supervision  -EJ      Recorded by [EJ] La Smith, PT 01/02/19 0915      Row Name 01/02/19 0855             Stand-Sit Transfer    Stand-Sit [place] (Transfers)  supervision  -EJ      Recorded by [EJ] La Smith, PT 01/02/19 0915      Row Name 01/02/19 0855             Gait/Stairs Assessment/Training    [place] Level (Gait)  verbal cues;stand by assist;contact guard  -EJ      Distance in Feet (Gait)  160  -EJ      Deviations/Abnormal Patterns (Gait)  antalgic;esa decreased  -EJ      Bilateral Gait Deviations  forward flexed posture  -EJ      [place] Level (Stairs)  verbal cues;contact guard  -EJ      Handrail Location (Stairs)  both sides  -EJ      Number of Steps (Stairs)  8  -EJ      Ascending Technique (Stairs)  step-over-step  -EJ      Descending Technique (Stairs)  step-over-step  -EJ      Recorded by [EJ] La Smith, PT 01/02/19 0915      Row Name 01/02/19 0855             Positioning and Restraints    Pre-Treatment Position  sitting in chair/recliner  -EJ      Post Treatment Position  bed  -EJ      In Bed  notified nsg;sitting EOB;call light within reach;encouraged to call for assist;with family/caregiver  -EJ      Recorded by [EJ] La Smith, PT 01/02/19 0915      Row Name 01/02/19 0855             Pain Scale: Numbers Pre/Post-Treatment    Pain Scale: Numbers, Pretreatment  4/10  -EJ      Pain Scale: Numbers, Post-Treatment  4/10  -EJ      Pain Location - Orientation  incisional  -EJ      Pain Location  chest  -EJ      Recorded by [EJ] La Smith, PT 01/02/19 0915      Row Name                Wound 12/27/18 0931 chest incision    Wound - Properties Group Date first assessed: 12/27/18 [SR] Time first assessed: 0931 [SR] Location: chest [SR] Type: incision [SR] Recorded by:  [SR] Fabienne Flores RN 12/27/18 0931    Row Name                Wound 12/27/18 0931 Left leg incision    Wound - Properties Group Date  first assessed: 12/27/18 [SR] Time first assessed: 0931 [SR] Side: Left [SR] Location: leg [SR] Type: incision [SR] Recorded by:  [SR] Fabienne Flores RN 12/27/18 0931      User Key  (r) = Recorded By, (t) = Taken By, (c) = Cosigned By    Initials Name Effective Dates Discipline    SR Fabienne Flores RN 06/16/16 -  Nurse    La Greer PT 04/03/18 -  PT          Wound 12/27/18 0931 chest incision (Active)   Dressing Appearance open to air 1/2/2019  4:15 AM   Closure Approximated 1/2/2019  4:15 AM   Base dry;clean 1/2/2019  4:15 AM   Drainage Amount none 1/2/2019  4:15 AM   Care, Wound cleansed with;antimicrobial agent applied 1/1/2019 12:10 PM   Dressing Care, Wound open to air 1/2/2019  4:15 AM       Wound 12/27/18 0931 Left leg incision (Active)   Dressing Appearance open to air 1/2/2019  4:15 AM   Closure Liquid skin adhesive 1/2/2019  4:15 AM   Base dry;pink 1/2/2019  4:15 AM   Drainage Amount none 1/2/2019  4:15 AM   Dressing Care, Wound open to air 1/2/2019  4:15 AM           Physical Therapy Education     Title: PT OT SLP Therapies (Done)     Topic: Physical Therapy (Done)     Point: Mobility training (Done)     Learning Progress Summary           Patient Acceptance, E,TB,D, VU,NR by HERMINIO at 1/2/2019  9:15 AM    Acceptance, E, VU by CA at 1/1/2019  9:47 AM    Acceptance, E, VU by MA at 12/31/2018 10:19 AM    Acceptance, E, VU by MA at 12/31/2018 10:18 AM    Acceptance, E, VU by MA at 12/30/2018  9:50 AM    Acceptance, E,TB,D, VU,NR by MARIANELA at 12/29/2018  3:55 PM    Acceptance, E,TB,D, VU,NR by MARIANELA at 12/28/2018 10:00 AM   Family Acceptance, E,TB,D, VU,NR by HERMINIO at 1/2/2019  9:15 AM                   Point: Home exercise program (Done)     Learning Progress Summary           Patient Acceptance, E, VU by CA at 1/1/2019  9:47 AM    AcceptanceNOEMY VU by MA at 12/31/2018 10:19 AM    AcceptanceNOEMY TB, D, VU,NR by  at 12/29/2018  3:55 PM    NOEMY Interiano TB, D, VU,NR by  at 12/28/2018 10:00 AM                    Point: Body mechanics (Done)     Learning Progress Summary           Patient Acceptance, E, VU by CA at 1/1/2019  9:47 AM    Acceptance, E, VU by MA at 12/31/2018 10:19 AM    Acceptance, E, VU by MA at 12/31/2018 10:18 AM    Acceptance, E, VU by MA at 12/30/2018  9:50 AM    Acceptance, E,TB,D, VU,NR by  at 12/29/2018  3:55 PM    Acceptance, E,TB,D, VU,NR by  at 12/28/2018 10:00 AM                   Point: Precautions (Done)     Learning Progress Summary           Patient Acceptance, E, VU by CA at 1/1/2019  9:47 AM    Acceptance, E, VU by MA at 12/31/2018 10:19 AM    Acceptance, E, VU by MA at 12/31/2018 10:18 AM    Acceptance, E, VU by MA at 12/30/2018  9:50 AM    Acceptance, E,TB,D, VU,NR by  at 12/29/2018  3:55 PM    Acceptance, E,TB,D, VU,NR by  at 12/28/2018 10:00 AM                               User Key     Initials Effective Dates Name Provider Type Discipline     04/03/18 -  Jasmina Neely, PT Physical Therapist PT    EJ 04/03/18 -  La Smith, PT Physical Therapist PT    MA 04/03/18 -  Mai Rodriguez, PT Physical Therapist PT    CA 06/13/18 -  Nicole Greenberg, PT Physical Therapist PT                PT Recommendation and Plan     Plan of Care Reviewed With: patient, spouse  Progress: improving  Outcome Summary: Pt agreeable to PT, just c/o stiffness. He is ambulating well with CGA/SBA. He was also able to negotiate a flight of steps today without difficulty. He is hopeful to go home today or tomorrow. Will continue to progress as tolerated.  Outcome Measures     Row Name 01/02/19 0900 01/01/19 0900 12/31/18 1000       How much help from another person do you currently need...    Turning from your back to your side while in flat bed without using bedrails?  3  -EJ  3  -CA  3  -MA    Moving from lying on back to sitting on the side of a flat bed without bedrails?  3  -EJ  3  -CA  3  -MA    Moving to and from a bed to a chair (including a wheelchair)?  3  -EJ  3  -CA  3  -MA     Standing up from a chair using your arms (e.g., wheelchair, bedside chair)?  3  -EJ  3  -CA  3  -MA    Climbing 3-5 steps with a railing?  3  -EJ  3  -CA  2  -MA    To walk in hospital room?  3  -EJ  3  -CA  3  -MA    AM-PAC 6 Clicks Score  18  -EJ  18  -CA  17  -MA       Functional Assessment    Outcome Measure Options  AM-PAC 6 Clicks Basic Mobility (PT)  -EJ  AM-PAC 6 Clicks Basic Mobility (PT)  -CA  AM-PAC 6 Clicks Basic Mobility (PT)  -MA      User Key  (r) = Recorded By, (t) = Taken By, (c) = Cosigned By    Initials Name Provider Type    La Greer, PT Physical Therapist    Mai Wang, PT Physical Therapist    Nicole Saldivar, PT Physical Therapist         Time Calculation:   PT Charges     Row Name 01/02/19 0917             Time Calculation    Start Time  0855  -EJ      Stop Time  0913  -EJ      Time Calculation (min)  18 min  -EJ      PT Received On  01/02/19  -EJ      PT - Next Appointment  01/03/19  -EJ        User Key  (r) = Recorded By, (t) = Taken By, (c) = Cosigned By    Initials Name Provider Type    La Greer, PT Physical Therapist        Therapy Suggested Charges     Code   Minutes Charges    None           Therapy Charges for Today     Code Description Service Date Service Provider Modifiers Qty    33411085263  PT THER PROC EA 15 MIN 1/2/2019 La Smith, PT GP 1          PT G-Codes  Outcome Measure Options: AM-PAC 6 Clicks Basic Mobility (PT)  AM-PAC 6 Clicks Score: 18    La Smith, PT  1/2/2019

## 2019-01-02 NOTE — PROGRESS NOTES
" LOS: 7 days   Patient Care Team:  Erick Jessica MD as PCP - General (Family Medicine)    Chief Complaint:  Post op    Subjective:  Symptoms:  No shortness of breath.    Diet:  No nausea.    Activity level: Returning to normal.          Vital Signs  Temp:  [97.6 °F (36.4 °C)-98.7 °F (37.1 °C)] 97.6 °F (36.4 °C)  Heart Rate:  [52-61] 57  Resp:  [16-18] 18  BP: (114-144)/(74-94) 114/80  Body mass index is 28.5 kg/m².    Intake/Output Summary (Last 24 hours) at 1/2/2019 0817  Last data filed at 1/2/2019 0749  Gross per 24 hour   Intake 840 ml   Output 1075 ml   Net -235 ml     I/O this shift:  In: 240 [P.O.:240]  Out: -             12/30/18  0500 01/01/19  0647 01/01/19  2201   Weight: 98.5 kg (217 lb 3.2 oz) 98.4 kg (217 lb) 98 kg (216 lb)         Objective:  General Appearance:  In no acute distress.    Vital signs: (most recent): Blood pressure 114/80, pulse 57, temperature 97.6 °F (36.4 °C), temperature source Oral, resp. rate 18, height 185.4 cm (73\"), weight 98 kg (216 lb), SpO2 95 %.    Output: Producing urine and producing stool.    HEENT: Normal HEENT exam.    Lungs:  Normal effort and normal respiratory rate.    Heart: Bradycardia.              Results Review:        WBC WBC   Date Value Ref Range Status   01/02/2019 12.52 (H) 4.50 - 10.70 10*3/mm3 Final   12/31/2018 11.42 (H) 4.50 - 10.70 10*3/mm3 Final      HGB Hemoglobin   Date Value Ref Range Status   01/02/2019 12.4 (L) 13.7 - 17.6 g/dL Final   12/31/2018 10.6 (L) 13.7 - 17.6 g/dL Final      HCT Hematocrit   Date Value Ref Range Status   01/02/2019 39.1 (L) 40.4 - 52.2 % Final   12/31/2018 33.7 (L) 40.4 - 52.2 % Final      Platelets Platelets   Date Value Ref Range Status   01/02/2019 339 140 - 500 10*3/mm3 Final   12/31/2018 204 140 - 500 10*3/mm3 Final        PT/INR:    Protime   Date Value Ref Range Status   01/02/2019 14.9 (H) 11.7 - 14.2 Seconds Final   01/01/2019 14.2 11.7 - 14.2 Seconds Final   12/31/2018 14.7 (H) 11.7 - 14.2 Seconds Final "   /  INR   Date Value Ref Range Status   01/02/2019 1.19 (H) 0.90 - 1.10 Final   01/01/2019 1.12 (H) 0.90 - 1.10 Final   12/31/2018 1.17 (H) 0.90 - 1.10 Final       Sodium Sodium   Date Value Ref Range Status   01/02/2019 139 136 - 145 mmol/L Final   01/01/2019 138 136 - 145 mmol/L Final   12/31/2018 135 (L) 136 - 145 mmol/L Final      Potassium Potassium   Date Value Ref Range Status   01/02/2019 3.7 3.5 - 5.2 mmol/L Final   01/01/2019 3.7 3.5 - 5.2 mmol/L Final   12/31/2018 3.9 3.5 - 5.2 mmol/L Final   12/31/2018 3.5 3.5 - 5.2 mmol/L Final      Chloride Chloride   Date Value Ref Range Status   01/02/2019 100 98 - 107 mmol/L Final   01/01/2019 101 98 - 107 mmol/L Final   12/31/2018 99 98 - 107 mmol/L Final      Bicarbonate CO2   Date Value Ref Range Status   01/02/2019 25.5 22.0 - 29.0 mmol/L Final   01/01/2019 25.4 22.0 - 29.0 mmol/L Final   12/31/2018 27.0 22.0 - 29.0 mmol/L Final      BUN BUN   Date Value Ref Range Status   01/02/2019 21 8 - 23 mg/dL Final   01/01/2019 22 8 - 23 mg/dL Final   12/31/2018 30 (H) 8 - 23 mg/dL Final      Creatinine Creatinine   Date Value Ref Range Status   01/02/2019 1.24 0.76 - 1.27 mg/dL Final   01/01/2019 1.07 0.76 - 1.27 mg/dL Final   12/31/2018 1.22 0.76 - 1.27 mg/dL Final      Calcium Calcium   Date Value Ref Range Status   01/02/2019 9.2 8.6 - 10.5 mg/dL Final   01/01/2019 9.1 8.6 - 10.5 mg/dL Final   12/31/2018 8.9 8.6 - 10.5 mg/dL Final      Magnesium No results found for: MG         aspirin 81 mg Oral Daily   atorvastatin 40 mg Oral Nightly   enoxaparin 40 mg Subcutaneous Daily   furosemide 40 mg Oral Daily   lisinopril 5 mg Oral Q24H   mupirocin  Each Nare BID   pantoprazole 40 mg Oral Daily   potassium chloride 20 mEq Oral Daily   potassium chloride 20 mEq Oral Once   sennosides-docusate sodium 2 tablet Oral Nightly   warfarin 2 mg Oral Daily       Sodium chloride 30 mL/hr   Sodium chloride 30 mL/hr           Patient Active Problem List   Diagnosis Code   •  Non-rheumatic mitral regurgitation I34.0   • CKD (chronic kidney disease) stage 3, GFR 30-59 ml/min (CMS/[facility]) N18.3   • Coronary artery disease involving native coronary artery of native heart without angina pectoris I25.10   • CHF (congestive heart failure), NYHA class III, acute on chronic, combined (CMS/[facility]) I50.43   • Mitral valve insufficiency I34.0   • Mitral incompetence I34.0       Assessment & Plan    -Severe mitral incompetence-s/p Mitral valve repair, CABGx1, left svg EVH- POD#5 [place]  -CAD  -Dilated cardiomyopathy, EF 50%  -Moderate pulmonary hypertension  -CHF class III, started lisinopril today  -Post op anemia- expected acute blood loss  -leukocytosis- probable reactive     Still junctional rate 57s.  Continue with coumadin.  Looks great.  orthopnea improved with diureses yesterday.    Karin Garcia, APRN  01/02/19  8:17 AM

## 2019-01-02 NOTE — PROGRESS NOTES
Case Management Discharge Note    Final Note: Pt d/c home with spouse transporting.  Pt and spouse denied need for home health services.  Pt spouse is an RN.  Pt was given a written script from ZULMA Ross to go to [facility] to have his PT/INR drawn on 1/7/19.      Destination      No service has been selected for the patient.      Durable Medical Equipment      No service has been selected for the patient.      Dialysis/Infusion      No service has been selected for the patient.      Home Medical Care      No service has been selected for the patient.      Community Resources      No service has been selected for the patient.        Other: (private auto)    Final Discharge Disposition Code: 01 - home or self-care

## 2019-01-02 NOTE — DISCHARGE PLACEMENT REQUEST
"Heri Vela (63 y.o. Male)     Date of Birth Social Security Number Address Home Phone MRN    1955  [address] [place] [address] [phone] [id number]    [employer]          [employer]        Admission Date Admission Type Admitting Provider Attending Provider Department, Room/Bed    12/26/18 Urgent Miguel Lobo MD Pollock, Samuel B., MD [facility] CARDIOVASC UNIT, 2231/1    Discharge Date Discharge Disposition Discharge Destination         Home-Health Care Svc              Attending Provider:  Miguel Lobo MD    Allergies:  Penicillins    Isolation:  None   Infection:  None   Code Status:  CPR    Ht:  185.4 cm (73\")   Wt:  98 kg (216 lb)    Admission Cmt:  None   Principal Problem:  Mitral valve insufficiency [I34.0] More...                 Active Insurance as of 12/26/2018     Primary Coverage     Payor Plan Insurance Group Employer/Plan Group    [id number]     Payor Plan Address Payor Plan Phone Number Payor Plan Fax Number Effective Dates    [address]   12/23/1995 - None Entered    [place] [address]       Subscriber Name Subscriber Birth Date Member ID       HERI VELA 1955 [id number]                 Emergency Contacts      (Rel.) Home Phone Work Phone Mobile Phone    VAHID VELA (Spouse) [phone] -- [phone]              "

## 2019-01-02 NOTE — PLAN OF CARE
Problem: Patient Care Overview  Goal: Plan of Care Review  Outcome: Ongoing (interventions implemented as appropriate)   01/02/19 0427   Coping/Psychosocial   Plan of Care Reviewed With patient   Plan of Care Review   Progress improving   OTHER   Outcome Summary VSS throughout shift. Currently in Junctional rhythm. Pain controlled with prn mediciation. Will continue to monitor      Goal: Individualization and Mutuality  Outcome: Ongoing (interventions implemented as appropriate)    Goal: Discharge Needs Assessment  Outcome: Ongoing (interventions implemented as appropriate)    Goal: Interprofessional Rounds/Family Conf  Outcome: Ongoing (interventions implemented as appropriate)      Problem: Cardiac Surgery (Adult)  Goal: Signs and Symptoms of Listed Potential Problems Will be Absent, Minimized or Managed (Cardiac Surgery)  Outcome: Ongoing (interventions implemented as appropriate)    Goal: Anesthesia/Sedation Recovery  Outcome: Ongoing (interventions implemented as appropriate)      Problem: Cardiac: Heart Failure (Adult)  Goal: Signs and Symptoms of Listed Potential Problems Will be Absent, Minimized or Managed (Cardiac: Heart Failure)  Outcome: Ongoing (interventions implemented as appropriate)      Problem: Skin Injury Risk (Adult)  Goal: Identify Related Risk Factors and Signs and Symptoms  Outcome: Ongoing (interventions implemented as appropriate)    Goal: Skin Health and Integrity  Outcome: Ongoing (interventions implemented as appropriate)      Problem: Fall Risk (Adult)  Goal: Identify Related Risk Factors and Signs and Symptoms  Outcome: Ongoing (interventions implemented as appropriate)    Goal: Absence of Fall  Outcome: Ongoing (interventions implemented as appropriate)

## 2019-01-03 ENCOUNTER — READMISSION MANAGEMENT (OUTPATIENT)
Dept: CALL CENTER | Facility: HOSPITAL | Age: 64
End: 2019-01-03

## 2019-01-03 NOTE — OUTREACH NOTE
Prep Survey      Responses   Facility patient discharged from?  [facility]   Is patient eligible?  Yes   Discharge diagnosis  CABG   Does the patient have one of the following disease processes/diagnoses(primary or secondary)?  Cardiothoracic surgery   Does the patient have Home health ordered?  Yes   Is there a DME ordered?  No   Prep survey completed?  Yes          Miroslava Suarez RN

## 2019-01-04 ENCOUNTER — READMISSION MANAGEMENT (OUTPATIENT)
Dept: CALL CENTER | Facility: HOSPITAL | Age: 64
End: 2019-01-04

## 2019-01-04 NOTE — OUTREACH NOTE
CT Surgery Week 1 Survey      Responses   Facility patient discharged from?  [facility]   Does the patient have one of the following disease processes/diagnoses(primary or secondary)?  Cardiothoracic surgery   Is there a successful TCM telephone encounter documented?  No   Week 1 attempt successful?  No   Unsuccessful attempts  Attempt 1            Sanna Lanier RN

## 2019-01-07 ENCOUNTER — TELEPHONE (OUTPATIENT)
Dept: CARDIAC SURGERY | Facility: CLINIC | Age: 64
End: 2019-01-07

## 2019-01-07 ENCOUNTER — TELEPHONE (OUTPATIENT)
Dept: CARDIOLOGY | Facility: CLINIC | Age: 64
End: 2019-01-07

## 2019-01-07 ENCOUNTER — HOSPITAL ENCOUNTER (OUTPATIENT)
Dept: OTHER | Facility: HOSPITAL | Age: 64
Discharge: HOME | End: 2019-01-07

## 2019-01-07 ENCOUNTER — READMISSION MANAGEMENT (OUTPATIENT)
Dept: CALL CENTER | Facility: HOSPITAL | Age: 64
End: 2019-01-07

## 2019-01-07 LAB
INR PPP: 1.15 (ref 2–3)
PROTHROMBIN TIME: 11.7 S (ref 9.4–12)

## 2019-01-07 NOTE — TELEPHONE ENCOUNTER
After speaking with Vandana MAYA I called and spoke with patients wife . Coumadin dose has been changed to 4mg daily effective with today's dose. Vandana MAYA would like patient to have PT/INR rechecked 1/10/19 which was agreeable to him

## 2019-01-07 NOTE — TELEPHONE ENCOUNTER
It needs to be 6 weeks from discharge date. Yes we will need Zio report first and that takes a few weeks. If he starts to have problems they can call and leave me a message and I will see if we need to move the appt up.....Reyna

## 2019-01-07 NOTE — TELEPHONE ENCOUNTER
"Spoke to patient's wife, Ms Oneill Read @ [phone].  She is unsure when to make her 's follow up appointment with Dr Murphy.  Notes from Karin VELEZ says \"He will be sent home with a holter monitor and will need to follow up with Dr. Cheng in 2 weeks.  He will need to follow up with Dr. Bhagat in 2 weeks and his PCP in 1 week.  Post op appointment with Dr. Lobo on 2/6 at 11. \" but discharge summary stated follow up with Dr Murphy 4-6 weeks after zio results are available.   Please advise.  Thank you,  Miroslava    "

## 2019-01-07 NOTE — OUTREACH NOTE
CT Surgery Week 1 Survey      Responses   Facility patient discharged from?  [facility]   Does the patient have one of the following disease processes/diagnoses(primary or secondary)?  Cardiothoracic surgery   Is there a successful TCM telephone encounter documented?  No   Week 1 attempt successful?  No   Unsuccessful attempts  Attempt 2            Rodrigo Greer RN

## 2019-01-10 ENCOUNTER — TELEPHONE (OUTPATIENT)
Dept: CARDIAC SURGERY | Facility: CLINIC | Age: 64
End: 2019-01-10

## 2019-01-10 ENCOUNTER — OFFICE VISIT CONVERTED (OUTPATIENT)
Dept: PULMONOLOGY | Facility: CLINIC | Age: 64
End: 2019-01-10
Attending: INTERNAL MEDICINE

## 2019-01-10 ENCOUNTER — READMISSION MANAGEMENT (OUTPATIENT)
Dept: CALL CENTER | Facility: HOSPITAL | Age: 64
End: 2019-01-10

## 2019-01-10 ENCOUNTER — HOSPITAL ENCOUNTER (OUTPATIENT)
Dept: OTHER | Facility: HOSPITAL | Age: 64
Discharge: HOME | End: 2019-01-10

## 2019-01-10 DIAGNOSIS — I25.119 ATHEROSCLEROSIS OF NATIVE CORONARY ARTERY OF NATIVE HEART WITH ANGINA PECTORIS (HCC): ICD-10-CM

## 2019-01-10 DIAGNOSIS — I34.0 NON-RHEUMATIC MITRAL REGURGITATION: Primary | ICD-10-CM

## 2019-01-10 LAB
INR PPP: 1.14 (ref 2–3)
PROTHROMBIN TIME: 11.6 S (ref 9.4–12)

## 2019-01-10 NOTE — TELEPHONE ENCOUNTER
After receiving PT/INR results from [facility] I spoke with Karin MAYA then called Mr Lazcano with instructions for his coumadin dosing. He will take 6mg daily starting today and will have his PT/INR re-checked 1/14/19. This was agreeable to him

## 2019-01-10 NOTE — OUTREACH NOTE
CT Surgery Week 2 Survey      Responses   Facility patient discharged from?  [facility]   Does the patient have one of the following disease processes/diagnoses(primary or secondary)?  Cardiothoracic surgery   Week 2 attempt successful?  No   Unsuccessful attempts  Attempt 1          Marlee Paz RN

## 2019-01-10 NOTE — TELEPHONE ENCOUNTER
I called and spoke with patients wife who stated there was some confusion regarding his labs this morning when they went to [place] to have them drawn. The labs have been drawn and I have called the lab asking them to fax us the results when available.

## 2019-01-11 ENCOUNTER — READMISSION MANAGEMENT (OUTPATIENT)
Dept: CALL CENTER | Facility: HOSPITAL | Age: 64
End: 2019-01-11

## 2019-01-11 NOTE — OUTREACH NOTE
CT Surgery Week 2 Survey      Responses   Facility patient discharged from?  [facility]   Does the patient have one of the following disease processes/diagnoses(primary or secondary)?  Cardiothoracic surgery   Week 2 attempt successful?  Yes   Call start time  1546   Call end time  1552   Meds reviewed with patient/caregiver?  Yes   Is the patient having any side effects they believe may be caused by any medication additions or changes?  No   Does the patient have all medications related to this admission filled (includes all antibiotics, pain medications, cardiac medications, etc.)  Yes   Is the patient taking all medications as directed (includes completed medication regime)?  Yes   Does the patient have a primary care provider?   Yes   Does the patient have an appointment scheduled with their C/T surgeon?  Yes   Has the patient kept scheduled appointments due by today?  Yes   Has home health visited the patient within 72 hours of discharge?  N/A   Psychosocial issues?  No   Did the patient receive a copy of their discharge instructions?  Yes   Nursing interventions  Reviewed instructions with patient   What is the patient's perception of their health status since discharge?  Improving   Is the patient/caregiver able to teach back normal signs of recovery?  Nausea and lack of appetite, Pain or discomfort at incisional site   Is the patient /caregiver able to teach back basic post-op care?  Continue use of incentive spirometry at least 1 week post discharge, Practice 'cough and deep breath', Drive as instructed by MD in discharge instructions, Take showers only when approved by MD-sponge bathamita until then, Keep incision areas clean, dry and protected   Is the patient/caregiver able to teach back signs and symptoms of incisional infection?  Increased redness, swelling or pain at the incisonal site, Increased drainage or bleeding   Is the patient/caregiver able to teach back steps to recovery at home?  Rest and  rebuild strength, gradually increase activity, Practice good oral hygiene   Is the patient /caregiver able to teach back the importance of cardiac rehab?  Yes   Nursing interventions  Provided education on importance of cardiac rehab   Additional teach back comments  Pt says he is doing well. Has some fatique. Breathing is better. Is taking small walks/weather permiting   Week 2 call completed?  Yes          Vannessa Rod, RN

## 2019-01-14 ENCOUNTER — HOSPITAL ENCOUNTER (OUTPATIENT)
Dept: OTHER | Facility: HOSPITAL | Age: 64
Discharge: HOME | End: 2019-01-14

## 2019-01-14 ENCOUNTER — TELEPHONE (OUTPATIENT)
Dept: CARDIAC SURGERY | Facility: CLINIC | Age: 64
End: 2019-01-14

## 2019-01-14 LAB
INR PPP: 1.54 (ref 2–3)
PROTHROMBIN TIME: 14.9 S (ref 9.4–12)

## 2019-01-14 NOTE — TELEPHONE ENCOUNTER
I spoke with the lab at [facility] and patient had INR drawn today 1.54. I called results to Karin MAYA and she wants to increase Coumadin dose to 8mg 1/14/19 and 1/15/19 then have the PT/INR re-checked 1/16/19. Mrs Read verbalized understanding. Patient has also had an episode of light headedness and generally is not feeling well. He did experience a brief episode of A-fib while in the hospital according to his wife. They will go to the ER and have an EKG completed today

## 2019-01-17 ENCOUNTER — HOSPITAL ENCOUNTER (OUTPATIENT)
Dept: OTHER | Facility: HOSPITAL | Age: 64
Discharge: HOME | End: 2019-01-17

## 2019-01-17 ENCOUNTER — TELEPHONE (OUTPATIENT)
Dept: CARDIAC SURGERY | Facility: CLINIC | Age: 64
End: 2019-01-17

## 2019-01-17 LAB
INR PPP: 1.75 (ref 2–3)
PROTHROMBIN TIME: 16.8 S (ref 9.4–12)

## 2019-01-17 NOTE — TELEPHONE ENCOUNTER
Received patients INR (1.54) today I spoke with Karin MAYA and called and spoke to Mr Lazcano. He was discharged yesterday from [facility]  and Dr Bhagat has taken over the coumadin dosing. He is currently using both Lovenox and Coumadin. Due to his recent a-fib Dr Bhagat will continue to monitor  . Patient will call our office with any questions or concerns prior to his post op visit

## 2019-01-18 ENCOUNTER — TELEPHONE (OUTPATIENT)
Dept: CARDIAC SURGERY | Facility: CLINIC | Age: 64
End: 2019-01-18

## 2019-01-18 ENCOUNTER — HOSPITAL ENCOUNTER (OUTPATIENT)
Dept: OTHER | Facility: HOSPITAL | Age: 64
Discharge: HOME | End: 2019-01-18

## 2019-01-18 LAB
INR PPP: 2.86 (ref 2–3)
PROTHROMBIN TIME: 26.7 S (ref 9.4–12)

## 2019-01-18 NOTE — TELEPHONE ENCOUNTER
Received INR of 2.86 for patient today. Per note from Benito Alicia RN Dr Bhagat to manage. Made ZULMA Stephen aware. I also spoke with patients wife Mai and they spoke with Dr Bhagat's office and coumadin was adjusted and they will re-check Monday.

## 2019-01-21 ENCOUNTER — HOSPITAL ENCOUNTER (OUTPATIENT)
Dept: OTHER | Facility: HOSPITAL | Age: 64
Discharge: HOME | End: 2019-01-21

## 2019-01-21 LAB
INR PPP: 2.85 (ref 2–3)
PROTHROMBIN TIME: 26.6 S (ref 9.4–12)

## 2019-01-25 ENCOUNTER — HOSPITAL ENCOUNTER (OUTPATIENT)
Dept: OTHER | Facility: HOSPITAL | Age: 64
Discharge: HOME | End: 2019-01-25

## 2019-01-25 LAB
INR PPP: 2.42 (ref 2–3)
PROTHROMBIN TIME: 22.7 S (ref 9.4–12)

## 2019-01-28 PROCEDURE — 0298T HOLTER MONITOR - 72 HOUR UP TO 21 DAY: CPT | Performed by: INTERNAL MEDICINE

## 2019-01-31 ENCOUNTER — CONVERSION ENCOUNTER (OUTPATIENT)
Dept: CARDIOLOGY | Facility: CLINIC | Age: 64
End: 2019-01-31

## 2019-01-31 ENCOUNTER — HOSPITAL ENCOUNTER (OUTPATIENT)
Dept: OTHER | Facility: HOSPITAL | Age: 64
Discharge: HOME | End: 2019-01-31

## 2019-01-31 ENCOUNTER — CONVERSION ENCOUNTER (OUTPATIENT)
Dept: CARDIOLOGY | Facility: CLINIC | Age: 64
End: 2019-01-31
Attending: INTERNAL MEDICINE

## 2019-01-31 LAB
INR PPP: 2.57 (ref 2–3)
PROTHROMBIN TIME: 24 S (ref 9.4–12)

## 2019-02-06 ENCOUNTER — OFFICE VISIT (OUTPATIENT)
Dept: CARDIAC SURGERY | Facility: CLINIC | Age: 64
End: 2019-02-06

## 2019-02-06 VITALS
WEIGHT: 213 LBS | SYSTOLIC BLOOD PRESSURE: 116 MMHG | HEART RATE: 58 BPM | HEIGHT: 73 IN | DIASTOLIC BLOOD PRESSURE: 80 MMHG | BODY MASS INDEX: 28.23 KG/M2 | OXYGEN SATURATION: 98 % | RESPIRATION RATE: 20 BRPM | TEMPERATURE: 98.3 F

## 2019-02-06 DIAGNOSIS — Z98.890 S/P MITRAL VALVE REPAIR: ICD-10-CM

## 2019-02-06 DIAGNOSIS — Z95.1 S/P CABG X 1: ICD-10-CM

## 2019-02-06 PROCEDURE — 99024 POSTOP FOLLOW-UP VISIT: CPT | Performed by: NURSE PRACTITIONER

## 2019-02-06 RX ORDER — LORATADINE 10 MG/1
10 TABLET ORAL AS NEEDED
COMMUNITY
End: 2022-06-28 | Stop reason: ALTCHOICE

## 2019-02-06 RX ORDER — SPIRONOLACTONE 25 MG/1
25 TABLET ORAL DAILY
COMMUNITY
End: 2021-06-28

## 2019-02-06 RX ORDER — AMIODARONE HYDROCHLORIDE 200 MG/1
200 TABLET ORAL DAILY
COMMUNITY
End: 2021-12-08

## 2019-02-06 NOTE — PROGRESS NOTES
"CARDIOVASCULAR SURGERY FOLLOW-UP PROGRESS NOTE  Chief Complaint: post op follow up        HPI:   Dear Dr. Jessica, Erick Hollins MD and colleagues:    It was nice to see Jw Read in follow up 6 weeks  after surgery.  As you know, he is a 63 y.o. male with severe mitral valve disease, single vessel CAD, dilated cardiomyopathy , CKD stage3 who underwent mitral valve repair with resection of flail leaflet and reconstruction, ring annuloplasty, CABGx1 by Dr. Lobo. He did well postoperatively and continues to do well. He comes in today complaining of nothing.  His activity level has been good.       Physical Exam:         /80   Pulse 58   Temp 98.3 °F (36.8 °C)   Resp 20   Ht 185.4 cm (73\")   Wt 96.6 kg (213 lb)   SpO2 98%   BMI 28.10 kg/m²   Heart:  regular rate and rhythm  Lungs:  clear to auscultation bilaterally  Extremities:  no edema  Incision(s):  sternum stable, healing well    Assessment/Plan:     S/P CABG and mitral valve repair. Overall, he is doing well.    No significant post-op complications    OK to begin cardiac rehab  Follow-up as scheduled with cardiology  Follow-up as scheduled with PCP    No restrictions of activity.      Thank you for allowing me to participate in the care of your   patient.  Regards,  ZULMA Mccoy      "

## 2019-02-14 ENCOUNTER — OFFICE VISIT (OUTPATIENT)
Dept: CARDIOLOGY | Facility: CLINIC | Age: 64
End: 2019-02-14

## 2019-02-14 ENCOUNTER — HOSPITAL ENCOUNTER (OUTPATIENT)
Dept: OTHER | Facility: HOSPITAL | Age: 64
Discharge: HOME | End: 2019-02-14
Attending: INTERNAL MEDICINE

## 2019-02-14 VITALS
DIASTOLIC BLOOD PRESSURE: 84 MMHG | SYSTOLIC BLOOD PRESSURE: 136 MMHG | WEIGHT: 217 LBS | HEART RATE: 57 BPM | BODY MASS INDEX: 28.76 KG/M2 | HEIGHT: 73 IN

## 2019-02-14 DIAGNOSIS — I49.8 JUNCTIONAL RHYTHM: Primary | ICD-10-CM

## 2019-02-14 DIAGNOSIS — I48.0 PAROXYSMAL ATRIAL FIBRILLATION (HCC): ICD-10-CM

## 2019-02-14 DIAGNOSIS — I47.29 NONSUSTAINED VENTRICULAR TACHYCARDIA (HCC): ICD-10-CM

## 2019-02-14 LAB
ALBUMIN SERPL-MCNC: 4.1 G/DL (ref 3.5–5)
ALBUMIN/GLOB SERPL: 1.1 {RATIO} (ref 1.4–2.6)
ALP SERPL-CCNC: 144 U/L (ref 56–155)
ALT SERPL-CCNC: 32 U/L (ref 10–40)
ANION GAP SERPL CALC-SCNC: 18 MMOL/L (ref 8–19)
AST SERPL-CCNC: 29 U/L (ref 15–50)
BILIRUB SERPL-MCNC: 0.33 MG/DL (ref 0.2–1.3)
BUN SERPL-MCNC: 18 MG/DL (ref 5–25)
BUN/CREAT SERPL: 14 {RATIO} (ref 6–20)
CALCIUM SERPL-MCNC: 9.1 MG/DL (ref 8.7–10.4)
CHLORIDE SERPL-SCNC: 104 MMOL/L (ref 99–111)
CONV CO2: 25 MMOL/L (ref 22–32)
CONV TOTAL PROTEIN: 7.8 G/DL (ref 6.3–8.2)
CREAT UR-MCNC: 1.33 MG/DL (ref 0.7–1.2)
GFR SERPLBLD BASED ON 1.73 SQ M-ARVRAT: 57 ML/MIN/{1.73_M2}
GLOBULIN UR ELPH-MCNC: 3.7 G/DL (ref 2–3.5)
GLUCOSE SERPL-MCNC: 75 MG/DL (ref 70–99)
INR PPP: 2.31 (ref 2–3)
OSMOLALITY SERPL CALC.SUM OF ELEC: 295 MOSM/KG (ref 273–304)
POTASSIUM SERPL-SCNC: 4.5 MMOL/L (ref 3.5–5.3)
PROTHROMBIN TIME: 21.7 S (ref 9.4–12)
SODIUM SERPL-SCNC: 142 MMOL/L (ref 135–147)

## 2019-02-14 PROCEDURE — 99213 OFFICE O/P EST LOW 20 MIN: CPT | Performed by: INTERNAL MEDICINE

## 2019-02-14 PROCEDURE — 93000 ELECTROCARDIOGRAM COMPLETE: CPT | Performed by: INTERNAL MEDICINE

## 2019-02-14 RX ORDER — ASCORBIC ACID 500 MG
1 TABLET ORAL DAILY
COMMUNITY

## 2019-02-14 RX ORDER — METOPROLOL SUCCINATE 25 MG/1
12.5 TABLET, EXTENDED RELEASE ORAL DAILY
Status: SHIPPED | COMMUNITY
Start: 2019-02-14 | End: 2021-12-08 | Stop reason: SDUPTHER

## 2019-02-14 RX ORDER — METOPROLOL SUCCINATE 25 MG
25 TABLET, EXTENDED RELEASE 24 HR ORAL DAILY
COMMUNITY
End: 2019-02-14

## 2019-02-14 RX ORDER — METOPROLOL SUCCINATE 100 MG/1
100 TABLET, EXTENDED RELEASE ORAL DAILY
Status: SHIPPED | COMMUNITY
Start: 2019-02-14 | End: 2019-02-14

## 2019-02-14 RX ORDER — WARFARIN SODIUM 3 MG/1
3 TABLET ORAL
COMMUNITY
End: 2021-12-08

## 2019-02-14 NOTE — PROGRESS NOTES
Date of Office Visit: 2019  Encounter Provider: Cyril Murphy MD  Place of Service: [facility]  Patient Name: Jw Lazcano  :1955    Chief complaint  Bradycardia    HPI: Jw Lazcano is a 63 y.o. male who presents today for bradycardia and atrial fibrillation.  The patient had repair of a mitral valve, and a single-vessel coronary artery bypass last month.  In the postoperative period, he was noted to have brief episode of atrial fibrillation and junctional rhythm.  He was asymptomatic and was discharged home without further intervention.  He did wear Zio Patch which normal mostly normal sinus rhythm, but there was a 13% burden of atrial fibrillation.  Patient noted an episode of mild dizziness that correlated with the atrial fibrillation episode.  He was actually admitted in [place] during this time.  His wife had recorded a telemetry strip which showed atrial fibrillation with RVR and he was hospitalized after this.  He was placed on amiodarone which improved his symptoms, and he converted to sinus rhythm.  He continues today on amiodarone at 200 mg daily.          Past Medical History:   Diagnosis Date   • CAD (coronary artery disease)     S/p CABG 2018   • Chest pain due to CAD 2018   • CKD (chronic kidney disease), stage III (CMS/[facility])    • MCINTOSH (dyspnea on exertion)    • Heart murmur    • Hyperlipidemia     Mild   • Hypertension    • Left atrial enlargement 2018    Noted on REINALDO   • Mild aortic valve regurgitation 2018    Noted on REINALDO   • Mitral valve insufficiency     S/p MVR 2018   • Mitral valve prolapse 2018    Moderate to Severe Noted on REINALDO; S/p MVR 2018   • Mitral valve regurgitation 18-[facility]    Severe Noted on Cardiac Cath & REINALDO-18   • SOB (shortness of breath) 2018       Past Surgical History:   Procedure Laterality Date   • CARDIAC CATHETERIZATION Bilateral 2018-[facility]    w/L  Ventriculography/Coronary Angiography--Single-Vessel CAD Noted w/Severe MVR   • CHOLECYSTECTOMY     • CORONARY ARTERY BYPASS GRAFT N/A 12/27/2018    Procedure: REINALDO STERNOTOMY CORONARY ARTERY BYPASS GRAFT TIMES 1 USING LEFT INTERNAL MAMMARY ARTERY AND LEFT GREATER SAPHENOUS VEIN GRAFT PER ENDOSCOPIC VEIN HARVESTING, MITRAL VALVE REPAIR AND PRP;  Surgeon: Miguel Lobo MD;  Location: [facility];  Service: Cardiothoracic   • REINALDO  12/24/18-[facility]    Moderate-Severe MV Prolapse; Mild Calcific Change; L Atrial Enlargement; EF 65%; Mild AVR Noted; Severe MVR Noted       Social History     Socioeconomic History   • Marital status:      Spouse name: Not on file   • Number of children: Not on file   • Years of education: Not on file   • Highest education level: Not on file   Social Needs   • Financial resource strain: Not on file   • Food insecurity - worry: Not on file   • Food insecurity - inability: Not on file   • Transportation needs - medical: Not on file   • Transportation needs - non-medical: Not on file   Occupational History   • Not on file   Tobacco Use   • Smoking status: Never Smoker   • Smokeless tobacco: Never Used   Substance and Sexual Activity   • Alcohol use: Not on file   • Drug use: No   • Sexual activity: Defer   Other Topics Concern   • Not on file   Social History Narrative   • Not on file       Family History   Problem Relation Age of Onset   • Valvular heart disease Father         In the 1970's       Review of Systems   Constitution: Negative for weakness and malaise/fatigue.   HENT: Negative.    Eyes: Negative.    Cardiovascular: Negative for chest pain, dyspnea on exertion, leg swelling and near-syncope.   Respiratory: Negative for cough and shortness of breath.    Endocrine: Negative.    Hematologic/Lymphatic: Negative.    Skin: Negative.    Musculoskeletal: Negative.    Gastrointestinal: Negative.    Genitourinary: Negative.    Neurological: Positive for dizziness.  "  Psychiatric/Behavioral: Negative.    Allergic/Immunologic: Negative.        Allergies   Allergen Reactions   • Penicillins Rash     Unknown           Current Outpatient Medications:   •  amiodarone (PACERONE) 200 MG tablet, Take 200 mg by mouth Daily., Disp: , Rfl:   •  aspirin 81 MG EC tablet, Take 1 tablet by mouth Daily for 90 days., Disp: 90 tablet, Rfl: 0  •  atorvastatin (LIPITOR) 40 MG tablet, Take 40 mg by mouth Daily., Disp: , Rfl:   •  furosemide (LASIX) 40 MG tablet, Take 1 tablet by mouth Daily. (Patient taking differently: Take 20 mg by mouth As Needed.), Disp: 30 tablet, Rfl: 0  •  lisinopril (PRINIVIL,ZESTRIL) 5 MG tablet, Take 1 tablet by mouth Daily., Disp: 30 tablet, Rfl: 3  •  loratadine (CLARITIN) 10 MG tablet, Take 10 mg by mouth As Needed for Allergies., Disp: , Rfl:   •  metoprolol succinate XL (TOPROL-XL) 25 MG 24 hr tablet, Take 0.5 tablets by mouth Daily., Disp: , Rfl:   •  Multiple Vitamin (MULTIVITAMIN) tablet tablet, Take 1 tablet by mouth Daily., Disp: , Rfl:   •  spironolactone (ALDACTONE) 25 MG tablet, Take 25 mg by mouth Daily., Disp: , Rfl:   •  warfarin (COUMADIN) 3 MG tablet, 3 mg. 2 tabs daily, Disp: , Rfl:       Objective:     Vitals:    02/14/19 1037 02/14/19 1038   BP: 136/88 136/84   BP Location: Right arm Left arm   Pulse:  57   Weight:  98.4 kg (217 lb)   Height:  185.4 cm (73\")     Body mass index is 28.63 kg/m².    PHYSICAL EXAM:    Physical Exam   Constitutional: He is oriented to person, place, and time. No distress.   HENT:   Head: Normocephalic and atraumatic.   Right Ear: External ear normal.   Left Ear: External ear normal.   Eyes: Conjunctivae are normal. Right eye exhibits no discharge. Left eye exhibits no discharge.   Neck: Neck supple. No JVD present.   Cardiovascular: Normal rate, regular rhythm and normal heart sounds.   Pulmonary/Chest: Effort normal and breath sounds normal. No respiratory distress.   Abdominal: Soft. Bowel sounds are normal. He exhibits " no distension. There is no tenderness.   Musculoskeletal: Normal range of motion. He exhibits no edema.   Neurological: He is alert and oriented to person, place, and time. No cranial nerve deficit.   Skin: Skin is warm and dry. He is not diaphoretic.   Psychiatric: He has a normal mood and affect. His behavior is normal.   Nursing note and vitals reviewed.          ECG 12 Lead  Date/Time: 12/30/2018 5:41 PM  Performed by: Cyril Murphy MD  Authorized by: Cyril Murphy MD   Comparison: compared with previous ECG   Comparison to previous ECG: Sinus rhythm has replaced junctional rhythm  Rhythm: sinus rhythm    Clinical impression: normal ECG          I reviewed his Holter monitor tracing which showed an episode of atrial fibrillation and nonsustained ventricular tachycardia  Assessment:       Diagnosis Plan   1. Junctional rhythm     2. Paroxysmal atrial fibrillation (CMS/HCC)     3. Nonsustained ventricular tachycardia (CMS/HCC)            Plan:       The junctional bradycardia has resolved.  No further evaluation needed.  He walks several miles yesterday without difficulty.  He has paroxysmal atrial fibrillation I think he warrants anticoagulation and advised him to continue the Coumadin.  I do not think amiodarone should be used as a long-term rhythm control strategy.  It is decided to pursue rhythm control alternative therapy should be considered I would be glad to help with this if needed.  His nonsustained ventricular tachycardia needs no further evaluation.  He is going to follow-up as needed and continue routine follow-up with Dr. Bhagat in [place].    As always, it has been a pleasure to participate in your patient's care.      Sincerely,         Cyril Murphy MD

## 2019-02-18 LAB
T4 FREE SERPL-MCNC: 1.5 NG/DL (ref 0.9–1.8)
TSH SERPL-ACNC: 6 M[IU]/L (ref 0.27–4.2)

## 2019-03-28 ENCOUNTER — HOSPITAL ENCOUNTER (OUTPATIENT)
Dept: CARDIAC REHAB | Facility: HOSPITAL | Age: 64
Setting detail: RECURRING SERIES
Discharge: HOME | End: 2019-07-25
Attending: INTERNAL MEDICINE

## 2019-04-10 ENCOUNTER — HOSPITAL ENCOUNTER (OUTPATIENT)
Dept: OTHER | Facility: HOSPITAL | Age: 64
Discharge: HOME | End: 2019-04-10
Attending: INTERNAL MEDICINE

## 2019-04-10 LAB
ALBUMIN SERPL-MCNC: 4 G/DL (ref 3.5–5)
ALBUMIN SERPL-MCNC: 4.1 G/DL (ref 3.5–5)
ALBUMIN/GLOB SERPL: 1.3 {RATIO} (ref 1.4–2.6)
ALP SERPL-CCNC: 104 U/L (ref 56–155)
ALT SERPL-CCNC: 41 U/L (ref 10–40)
ANION GAP SERPL CALC-SCNC: 13 MMOL/L (ref 8–19)
ANION GAP SERPL CALC-SCNC: 13 MMOL/L (ref 8–19)
AST SERPL-CCNC: 34 U/L (ref 15–50)
BILIRUB SERPL-MCNC: 0.56 MG/DL (ref 0.2–1.3)
BUN SERPL-MCNC: 28 MG/DL (ref 5–25)
BUN SERPL-MCNC: 28 MG/DL (ref 5–25)
BUN/CREAT SERPL: 19 {RATIO} (ref 6–20)
BUN/CREAT SERPL: 19 {RATIO} (ref 6–20)
CALCIUM SERPL-MCNC: 9 MG/DL (ref 8.7–10.4)
CALCIUM SERPL-MCNC: 9.1 MG/DL (ref 8.7–10.4)
CHLORIDE SERPL-SCNC: 106 MMOL/L (ref 99–111)
CHLORIDE SERPL-SCNC: 107 MMOL/L (ref 99–111)
CHOLEST SERPL-MCNC: 123 MG/DL (ref 107–200)
CHOLEST/HDLC SERPL: 3.1 {RATIO} (ref 3–6)
CONV CO2: 27 MMOL/L (ref 22–32)
CONV CO2: 27 MMOL/L (ref 22–32)
CONV TOTAL PROTEIN: 7.2 G/DL (ref 6.3–8.2)
CREAT UR-MCNC: 1.48 MG/DL (ref 0.7–1.2)
CREAT UR-MCNC: 1.5 MG/DL (ref 0.7–1.2)
GFR SERPLBLD BASED ON 1.73 SQ M-ARVRAT: 49 ML/MIN/{1.73_M2}
GFR SERPLBLD BASED ON 1.73 SQ M-ARVRAT: 50 ML/MIN/{1.73_M2}
GLOBULIN UR ELPH-MCNC: 3.1 G/DL (ref 2–3.5)
GLUCOSE SERPL-MCNC: 90 MG/DL (ref 70–99)
GLUCOSE SERPL-MCNC: 91 MG/DL (ref 70–99)
HDLC SERPL-MCNC: 40 MG/DL (ref 40–60)
INR PPP: 2.04 (ref 2–3)
LDLC SERPL CALC-MCNC: 68 MG/DL (ref 70–100)
OSMOLALITY SERPL CALC.SUM OF ELEC: 297 MOSM/KG (ref 273–304)
PHOSPHATE SERPL-MCNC: 3.6 MG/DL (ref 2.4–4.5)
POTASSIUM SERPL-SCNC: 4.9 MMOL/L (ref 3.5–5.3)
POTASSIUM SERPL-SCNC: 5.1 MMOL/L (ref 3.5–5.3)
PROTHROMBIN TIME: 19.3 S (ref 9.4–12)
SODIUM SERPL-SCNC: 141 MMOL/L (ref 135–147)
SODIUM SERPL-SCNC: 142 MMOL/L (ref 135–147)
T4 FREE SERPL-MCNC: 1.3 NG/DL (ref 0.9–1.8)
TRIGL SERPL-MCNC: 75 MG/DL (ref 40–150)
TSH SERPL-ACNC: 4.82 M[IU]/L (ref 0.27–4.2)
VLDLC SERPL-MCNC: 15 MG/DL (ref 5–37)

## 2019-04-11 ENCOUNTER — CONVERSION ENCOUNTER (OUTPATIENT)
Dept: CARDIOLOGY | Facility: CLINIC | Age: 64
End: 2019-04-11
Attending: INTERNAL MEDICINE

## 2019-04-11 ENCOUNTER — HOSPITAL ENCOUNTER (OUTPATIENT)
Dept: CT IMAGING | Facility: HOSPITAL | Age: 64
Discharge: HOME | End: 2019-04-11
Attending: INTERNAL MEDICINE

## 2019-04-17 ENCOUNTER — CONVERSION ENCOUNTER (OUTPATIENT)
Dept: CARDIOLOGY | Facility: CLINIC | Age: 64
End: 2019-04-17

## 2019-04-17 ENCOUNTER — OFFICE VISIT CONVERTED (OUTPATIENT)
Dept: CARDIOLOGY | Facility: CLINIC | Age: 64
End: 2019-04-17
Attending: INTERNAL MEDICINE

## 2019-05-03 ENCOUNTER — HOSPITAL ENCOUNTER (OUTPATIENT)
Dept: OTHER | Facility: HOSPITAL | Age: 64
Discharge: HOME | End: 2019-05-03
Attending: INTERNAL MEDICINE

## 2019-05-03 LAB
ANION GAP SERPL CALC-SCNC: 16 MMOL/L (ref 8–19)
BUN SERPL-MCNC: 29 MG/DL (ref 5–25)
BUN/CREAT SERPL: 19 {RATIO} (ref 6–20)
CALCIUM SERPL-MCNC: 9.2 MG/DL (ref 8.7–10.4)
CHLORIDE SERPL-SCNC: 104 MMOL/L (ref 99–111)
CONV CO2: 27 MMOL/L (ref 22–32)
CREAT UR-MCNC: 1.51 MG/DL (ref 0.7–1.2)
GFR SERPLBLD BASED ON 1.73 SQ M-ARVRAT: 49 ML/MIN/{1.73_M2}
GLUCOSE SERPL-MCNC: 69 MG/DL (ref 70–99)
OSMOLALITY SERPL CALC.SUM OF ELEC: 300 MOSM/KG (ref 273–304)
POTASSIUM SERPL-SCNC: 4.3 MMOL/L (ref 3.5–5.3)
SODIUM SERPL-SCNC: 143 MMOL/L (ref 135–147)

## 2019-05-10 ENCOUNTER — HOSPITAL ENCOUNTER (OUTPATIENT)
Dept: OTHER | Facility: HOSPITAL | Age: 64
Discharge: HOME | End: 2019-05-10
Attending: INTERNAL MEDICINE

## 2019-05-10 LAB
INR PPP: 1.4 (ref 2–3)
PROTHROMBIN TIME: 13.8 S (ref 9.4–12)

## 2019-05-20 ENCOUNTER — HOSPITAL ENCOUNTER (OUTPATIENT)
Dept: OTHER | Facility: HOSPITAL | Age: 64
Discharge: HOME | End: 2019-05-20
Attending: INTERNAL MEDICINE

## 2019-05-20 LAB
INR PPP: 1.6 (ref 2–3)
PROTHROMBIN TIME: 15.5 S (ref 9.4–12)

## 2019-05-30 ENCOUNTER — HOSPITAL ENCOUNTER (OUTPATIENT)
Dept: OTHER | Facility: HOSPITAL | Age: 64
Discharge: HOME | End: 2019-05-30
Attending: INTERNAL MEDICINE

## 2019-05-30 LAB
INR PPP: 2.15 (ref 2–3)
PROTHROMBIN TIME: 20.3 S (ref 9.4–12)

## 2019-06-07 ENCOUNTER — HOSPITAL ENCOUNTER (OUTPATIENT)
Dept: OTHER | Facility: HOSPITAL | Age: 64
Discharge: HOME | End: 2019-06-07
Attending: INTERNAL MEDICINE

## 2019-06-07 LAB
INR PPP: 1.89 (ref 2–3)
PROTHROMBIN TIME: 18 S (ref 9.4–12)

## 2019-06-19 ENCOUNTER — HOSPITAL ENCOUNTER (OUTPATIENT)
Dept: GENERAL RADIOLOGY | Facility: HOSPITAL | Age: 64
Discharge: HOME | End: 2019-06-19
Attending: INTERNAL MEDICINE

## 2019-06-19 LAB
INR PPP: 2.18 (ref 2–3)
PROTHROMBIN TIME: 20.5 S (ref 9.4–12)

## 2019-06-27 ENCOUNTER — OFFICE VISIT CONVERTED (OUTPATIENT)
Dept: PULMONOLOGY | Facility: CLINIC | Age: 64
End: 2019-06-27
Attending: INTERNAL MEDICINE

## 2019-07-18 ENCOUNTER — HOSPITAL ENCOUNTER (OUTPATIENT)
Dept: GENERAL RADIOLOGY | Facility: HOSPITAL | Age: 64
Discharge: HOME | End: 2019-07-18
Attending: INTERNAL MEDICINE

## 2019-07-18 LAB
INR PPP: 1.67 (ref 2–3)
PROTHROMBIN TIME: 16.1 S (ref 9.4–12)

## 2019-07-24 ENCOUNTER — HOSPITAL ENCOUNTER (OUTPATIENT)
Dept: GENERAL RADIOLOGY | Facility: HOSPITAL | Age: 64
Discharge: HOME | End: 2019-07-24
Attending: INTERNAL MEDICINE

## 2019-07-24 LAB
INR PPP: 1.66 (ref 2–3)
PROTHROMBIN TIME: 16 S (ref 9.4–12)

## 2019-08-02 ENCOUNTER — HOSPITAL ENCOUNTER (OUTPATIENT)
Dept: GENERAL RADIOLOGY | Facility: HOSPITAL | Age: 64
Discharge: HOME | End: 2019-08-02
Attending: INTERNAL MEDICINE

## 2019-08-02 LAB
INR PPP: 1.93 (ref 2–3)
PROTHROMBIN TIME: 18.4 S (ref 9.4–12)

## 2019-08-12 ENCOUNTER — HOSPITAL ENCOUNTER (OUTPATIENT)
Dept: OTHER | Facility: HOSPITAL | Age: 64
Discharge: HOME | End: 2019-08-12
Attending: INTERNAL MEDICINE

## 2019-08-12 LAB
INR PPP: 1.87 (ref 2–3)
PROTHROMBIN TIME: 17.9 S (ref 9.4–12)

## 2019-08-21 ENCOUNTER — HOSPITAL ENCOUNTER (OUTPATIENT)
Dept: OTHER | Facility: HOSPITAL | Age: 64
Discharge: HOME | End: 2019-08-21
Attending: NURSE PRACTITIONER

## 2019-08-21 LAB
ALBUMIN SERPL-MCNC: 4.1 G/DL (ref 3.5–5)
ALBUMIN/GLOB SERPL: 1.5 {RATIO} (ref 1.4–2.6)
ALP SERPL-CCNC: 110 U/L (ref 56–155)
ALT SERPL-CCNC: 26 U/L (ref 10–40)
ANION GAP SERPL CALC-SCNC: 15 MMOL/L (ref 8–19)
APPEARANCE UR: CLEAR
AST SERPL-CCNC: 29 U/L (ref 15–50)
BASOPHILS # BLD AUTO: 0.06 10*3/UL (ref 0–0.2)
BASOPHILS NFR BLD AUTO: 0.6 % (ref 0–3)
BILIRUB SERPL-MCNC: 0.66 MG/DL (ref 0.2–1.3)
BILIRUB UR QL: NEGATIVE
BUN SERPL-MCNC: 24 MG/DL (ref 5–25)
BUN/CREAT SERPL: 19 {RATIO} (ref 6–20)
CALCIUM SERPL-MCNC: 8.9 MG/DL (ref 8.7–10.4)
CHLORIDE SERPL-SCNC: 107 MMOL/L (ref 99–111)
CHOLEST SERPL-MCNC: 124 MG/DL (ref 107–200)
CHOLEST/HDLC SERPL: 3.5 {RATIO} (ref 3–6)
COLOR UR: ABNORMAL
CONV ABS IMM GRAN: 0.04 10*3/UL (ref 0–0.2)
CONV BACTERIA: NEGATIVE
CONV CO2: 26 MMOL/L (ref 22–32)
CONV COLLECTION SOURCE (UA): ABNORMAL
CONV HYALINE CASTS IN URINE MICRO: ABNORMAL /[LPF]
CONV IMMATURE GRAN: 0.4 % (ref 0–1.8)
CONV TOTAL PROTEIN: 6.9 G/DL (ref 6.3–8.2)
CONV UROBILINOGEN IN URINE BY AUTOMATED TEST STRIP: 1 {EHRLICHU}/DL (ref 0.1–1)
CREAT UR-MCNC: 1.28 MG/DL (ref 0.7–1.2)
DEPRECATED RDW RBC AUTO: 43.2 FL (ref 35.1–43.9)
EOSINOPHIL # BLD AUTO: 0.4 10*3/UL (ref 0–0.7)
EOSINOPHIL # BLD AUTO: 4 % (ref 0–7)
ERYTHROCYTE [DISTWIDTH] IN BLOOD BY AUTOMATED COUNT: 12.7 % (ref 11.6–14.4)
GFR SERPLBLD BASED ON 1.73 SQ M-ARVRAT: 59 ML/MIN/{1.73_M2}
GLOBULIN UR ELPH-MCNC: 2.8 G/DL (ref 2–3.5)
GLUCOSE SERPL-MCNC: 89 MG/DL (ref 70–99)
GLUCOSE UR QL: NEGATIVE MG/DL
HCT VFR BLD AUTO: 45.6 % (ref 42–52)
HDLC SERPL-MCNC: 35 MG/DL (ref 40–60)
HGB BLD-MCNC: 14.9 G/DL (ref 14–18)
HGB UR QL STRIP: ABNORMAL
KETONES UR QL STRIP: ABNORMAL MG/DL
LDLC SERPL CALC-MCNC: 68 MG/DL (ref 70–100)
LEUKOCYTE ESTERASE UR QL STRIP: NEGATIVE
LYMPHOCYTES # BLD AUTO: 2.51 10*3/UL (ref 1–5)
LYMPHOCYTES NFR BLD AUTO: 25.3 % (ref 20–45)
MCH RBC QN AUTO: 30.2 PG (ref 27–31)
MCHC RBC AUTO-ENTMCNC: 32.7 G/DL (ref 33–37)
MCV RBC AUTO: 92.3 FL (ref 80–96)
MONOCYTES # BLD AUTO: 0.81 10*3/UL (ref 0.2–1.2)
MONOCYTES NFR BLD AUTO: 8.1 % (ref 3–10)
NEUTROPHILS # BLD AUTO: 6.12 10*3/UL (ref 2–8)
NEUTROPHILS NFR BLD AUTO: 61.6 % (ref 30–85)
NITRITE UR QL STRIP: NEGATIVE
NRBC CBCN: 0 % (ref 0–0.7)
OSMOLALITY SERPL CALC.SUM OF ELEC: 300 MOSM/KG (ref 273–304)
PH UR STRIP.AUTO: 5.5 [PH] (ref 5–8)
PLATELET # BLD AUTO: 242 10*3/UL (ref 130–400)
PMV BLD AUTO: 10 FL (ref 9.4–12.4)
POTASSIUM SERPL-SCNC: 4.7 MMOL/L (ref 3.5–5.3)
PROT UR QL: NEGATIVE MG/DL
RBC # BLD AUTO: 4.94 10*6/UL (ref 4.7–6.1)
RBC #/AREA URNS HPF: ABNORMAL /[HPF]
SODIUM SERPL-SCNC: 143 MMOL/L (ref 135–147)
SP GR UR: 1.02 (ref 1–1.03)
T4 FREE SERPL-MCNC: 1.2 NG/DL (ref 0.9–1.8)
TRIGL SERPL-MCNC: 107 MG/DL (ref 40–150)
TSH SERPL-ACNC: 4 M[IU]/L (ref 0.27–4.2)
VLDLC SERPL-MCNC: 21 MG/DL (ref 5–37)
WBC # BLD AUTO: 9.94 10*3/UL (ref 4.8–10.8)
WBC #/AREA URNS HPF: ABNORMAL /[HPF]

## 2019-08-28 ENCOUNTER — OFFICE VISIT CONVERTED (OUTPATIENT)
Dept: CARDIOLOGY | Facility: CLINIC | Age: 64
End: 2019-08-28
Attending: INTERNAL MEDICINE

## 2019-10-07 ENCOUNTER — HOSPITAL ENCOUNTER (OUTPATIENT)
Dept: CT IMAGING | Facility: HOSPITAL | Age: 64
Discharge: HOME | End: 2019-10-07
Attending: INTERNAL MEDICINE

## 2019-10-07 LAB
CREAT BLD-MCNC: 1.2 MG/DL (ref 0.6–1.4)
GFR SERPLBLD BASED ON 1.73 SQ M-ARVRAT: >60 ML/MIN/{1.73_M2}

## 2019-10-08 ENCOUNTER — HOSPITAL ENCOUNTER (OUTPATIENT)
Dept: GENERAL RADIOLOGY | Facility: HOSPITAL | Age: 64
Discharge: HOME | End: 2019-10-08
Attending: INTERNAL MEDICINE

## 2019-10-08 LAB
INR PPP: 2.24 (ref 2–3)
PROTHROMBIN TIME: 22 S (ref 9.4–12)

## 2019-10-14 ENCOUNTER — CONVERSION ENCOUNTER (OUTPATIENT)
Dept: CARDIOLOGY | Facility: CLINIC | Age: 64
End: 2019-10-14
Attending: INTERNAL MEDICINE

## 2019-10-17 ENCOUNTER — OFFICE VISIT CONVERTED (OUTPATIENT)
Dept: PULMONOLOGY | Facility: CLINIC | Age: 64
End: 2019-10-17
Attending: INTERNAL MEDICINE

## 2020-02-05 ENCOUNTER — HOSPITAL ENCOUNTER (OUTPATIENT)
Dept: GENERAL RADIOLOGY | Facility: HOSPITAL | Age: 65
Discharge: HOME | End: 2020-02-05
Attending: INTERNAL MEDICINE

## 2020-02-05 LAB
25(OH)D3 SERPL-MCNC: 32.2 NG/ML (ref 30–100)
ANION GAP SERPL CALC-SCNC: 16 MMOL/L (ref 8–19)
APPEARANCE UR: ABNORMAL
BASOPHILS # BLD AUTO: 0.06 10*3/UL (ref 0–0.2)
BASOPHILS NFR BLD AUTO: 0.7 % (ref 0–3)
BILIRUB UR QL: NEGATIVE
BUN SERPL-MCNC: 20 MG/DL (ref 5–25)
BUN/CREAT SERPL: 15 {RATIO} (ref 6–20)
CALCIUM SERPL-MCNC: 8.8 MG/DL (ref 8.7–10.4)
CHLORIDE SERPL-SCNC: 109 MMOL/L (ref 99–111)
COLOR UR: ABNORMAL
CONV ABS IMM GRAN: 0.03 10*3/UL (ref 0–0.2)
CONV BACTERIA: NEGATIVE
CONV CO2: 22 MMOL/L (ref 22–32)
CONV COLLECTION SOURCE (UA): ABNORMAL
CONV CREATININE URINE, RANDOM: 363.9 MG/DL (ref 10–300)
CONV HYALINE CASTS IN URINE MICRO: ABNORMAL /[LPF]
CONV IMMATURE GRAN: 0.3 % (ref 0–1.8)
CONV PROTEIN TO CREATININE RATIO (RANDOM URINE): 0.04 {RATIO} (ref 0–0.1)
CONV UROBILINOGEN IN URINE BY AUTOMATED TEST STRIP: 1 {EHRLICHU}/DL (ref 0.1–1)
CREAT UR-MCNC: 1.34 MG/DL (ref 0.7–1.2)
DEPRECATED RDW RBC AUTO: 41.9 FL (ref 35.1–43.9)
EOSINOPHIL # BLD AUTO: 0.42 10*3/UL (ref 0–0.7)
EOSINOPHIL # BLD AUTO: 4.6 % (ref 0–7)
ERYTHROCYTE [DISTWIDTH] IN BLOOD BY AUTOMATED COUNT: 12.8 % (ref 11.6–14.4)
GFR SERPLBLD BASED ON 1.73 SQ M-ARVRAT: 56 ML/MIN/{1.73_M2}
GLUCOSE SERPL-MCNC: 90 MG/DL (ref 70–99)
GLUCOSE UR QL: NEGATIVE MG/DL
HCT VFR BLD AUTO: 44.5 % (ref 42–52)
HGB BLD-MCNC: 14.6 G/DL (ref 14–18)
HGB UR QL STRIP: ABNORMAL
KETONES UR QL STRIP: ABNORMAL MG/DL
LEUKOCYTE ESTERASE UR QL STRIP: NEGATIVE
LYMPHOCYTES # BLD AUTO: 2.59 10*3/UL (ref 1–5)
LYMPHOCYTES NFR BLD AUTO: 28.2 % (ref 20–45)
MCH RBC QN AUTO: 29.5 PG (ref 27–31)
MCHC RBC AUTO-ENTMCNC: 32.8 G/DL (ref 33–37)
MCV RBC AUTO: 89.9 FL (ref 80–96)
MONOCYTES # BLD AUTO: 0.71 10*3/UL (ref 0.2–1.2)
MONOCYTES NFR BLD AUTO: 7.7 % (ref 3–10)
NEUTROPHILS # BLD AUTO: 5.39 10*3/UL (ref 2–8)
NEUTROPHILS NFR BLD AUTO: 58.5 % (ref 30–85)
NITRITE UR QL STRIP: NEGATIVE
NRBC CBCN: 0 % (ref 0–0.7)
OSMOLALITY SERPL CALC.SUM OF ELEC: 296 MOSM/KG (ref 273–304)
PH UR STRIP.AUTO: 5 [PH] (ref 5–8)
PHOSPHATE SERPL-MCNC: 4 MG/DL (ref 2.4–4.5)
PLATELET # BLD AUTO: 235 10*3/UL (ref 130–400)
PMV BLD AUTO: 11.1 FL (ref 9.4–12.4)
POTASSIUM SERPL-SCNC: 4.5 MMOL/L (ref 3.5–5.3)
PROT UR QL: NEGATIVE MG/DL
PROT UR-MCNC: 13.6 MG/DL
RBC # BLD AUTO: 4.95 10*6/UL (ref 4.7–6.1)
RBC #/AREA URNS HPF: ABNORMAL /[HPF]
SODIUM SERPL-SCNC: 142 MMOL/L (ref 135–147)
SP GR UR: 1.03 (ref 1–1.03)
WBC # BLD AUTO: 9.2 10*3/UL (ref 4.8–10.8)
WBC #/AREA URNS HPF: ABNORMAL /[HPF]

## 2020-02-06 LAB — PTH-INTACT SERPL-MCNC: 39.6 PG/ML (ref 11.1–79.5)

## 2020-02-12 ENCOUNTER — HOSPITAL ENCOUNTER (OUTPATIENT)
Dept: GENERAL RADIOLOGY | Facility: HOSPITAL | Age: 65
Discharge: HOME | End: 2020-02-12
Attending: NURSE PRACTITIONER

## 2020-02-12 LAB
ALBUMIN SERPL-MCNC: 4 G/DL (ref 3.5–5)
ALBUMIN/GLOB SERPL: 1.3 {RATIO} (ref 1.4–2.6)
ALP SERPL-CCNC: 102 U/L (ref 56–155)
ALT SERPL-CCNC: 25 U/L (ref 10–40)
ANION GAP SERPL CALC-SCNC: 17 MMOL/L (ref 8–19)
AST SERPL-CCNC: 29 U/L (ref 15–50)
BASOPHILS # BLD AUTO: 0.05 10*3/UL (ref 0–0.2)
BASOPHILS NFR BLD AUTO: 0.5 % (ref 0–3)
BILIRUB SERPL-MCNC: 0.45 MG/DL (ref 0.2–1.3)
BUN SERPL-MCNC: 24 MG/DL (ref 5–25)
BUN/CREAT SERPL: 20 {RATIO} (ref 6–20)
CALCIUM SERPL-MCNC: 8.9 MG/DL (ref 8.7–10.4)
CHLORIDE SERPL-SCNC: 107 MMOL/L (ref 99–111)
CHOLEST SERPL-MCNC: 112 MG/DL (ref 107–200)
CHOLEST/HDLC SERPL: 3.1 {RATIO} (ref 3–6)
CONV ABS IMM GRAN: 0.04 10*3/UL (ref 0–0.2)
CONV CO2: 21 MMOL/L (ref 22–32)
CONV IMMATURE GRAN: 0.4 % (ref 0–1.8)
CONV TOTAL PROTEIN: 7.1 G/DL (ref 6.3–8.2)
CREAT UR-MCNC: 1.2 MG/DL (ref 0.7–1.2)
DEPRECATED RDW RBC AUTO: 42.1 FL (ref 35.1–43.9)
EOSINOPHIL # BLD AUTO: 0.35 10*3/UL (ref 0–0.7)
EOSINOPHIL # BLD AUTO: 3.7 % (ref 0–7)
ERYTHROCYTE [DISTWIDTH] IN BLOOD BY AUTOMATED COUNT: 12.8 % (ref 11.6–14.4)
GFR SERPLBLD BASED ON 1.73 SQ M-ARVRAT: >60 ML/MIN/{1.73_M2}
GLOBULIN UR ELPH-MCNC: 3.1 G/DL (ref 2–3.5)
GLUCOSE SERPL-MCNC: 87 MG/DL (ref 70–99)
HCT VFR BLD AUTO: 45.6 % (ref 42–52)
HDLC SERPL-MCNC: 36 MG/DL (ref 40–60)
HGB BLD-MCNC: 14.9 G/DL (ref 14–18)
LDLC SERPL CALC-MCNC: 64 MG/DL (ref 70–100)
LYMPHOCYTES # BLD AUTO: 3.06 10*3/UL (ref 1–5)
LYMPHOCYTES NFR BLD AUTO: 31.9 % (ref 20–45)
MCH RBC QN AUTO: 29.7 PG (ref 27–31)
MCHC RBC AUTO-ENTMCNC: 32.7 G/DL (ref 33–37)
MCV RBC AUTO: 91 FL (ref 80–96)
MONOCYTES # BLD AUTO: 0.76 10*3/UL (ref 0.2–1.2)
MONOCYTES NFR BLD AUTO: 7.9 % (ref 3–10)
NEUTROPHILS # BLD AUTO: 5.32 10*3/UL (ref 2–8)
NEUTROPHILS NFR BLD AUTO: 55.6 % (ref 30–85)
NRBC CBCN: 0 % (ref 0–0.7)
OSMOLALITY SERPL CALC.SUM OF ELEC: 293 MOSM/KG (ref 273–304)
PLATELET # BLD AUTO: 237 10*3/UL (ref 130–400)
PMV BLD AUTO: 10.7 FL (ref 9.4–12.4)
POTASSIUM SERPL-SCNC: 4.5 MMOL/L (ref 3.5–5.3)
RBC # BLD AUTO: 5.01 10*6/UL (ref 4.7–6.1)
SODIUM SERPL-SCNC: 140 MMOL/L (ref 135–147)
TRIGL SERPL-MCNC: 61 MG/DL (ref 40–150)
VLDLC SERPL-MCNC: 12 MG/DL (ref 5–37)
WBC # BLD AUTO: 9.58 10*3/UL (ref 4.8–10.8)

## 2020-02-19 ENCOUNTER — CONVERSION ENCOUNTER (OUTPATIENT)
Dept: CARDIOLOGY | Facility: CLINIC | Age: 65
End: 2020-02-19

## 2020-02-19 ENCOUNTER — CONVERSION ENCOUNTER (OUTPATIENT)
Dept: CARDIOLOGY | Facility: CLINIC | Age: 65
End: 2020-02-19
Attending: INTERNAL MEDICINE

## 2020-08-19 ENCOUNTER — HOSPITAL ENCOUNTER (OUTPATIENT)
Dept: GENERAL RADIOLOGY | Facility: HOSPITAL | Age: 65
Discharge: HOME | End: 2020-08-19
Attending: INTERNAL MEDICINE

## 2020-08-19 LAB
ALBUMIN SERPL-MCNC: 4 G/DL (ref 3.5–5)
ALBUMIN/GLOB SERPL: 1.3 {RATIO} (ref 1.4–2.6)
ALP SERPL-CCNC: 110 U/L (ref 56–155)
ALT SERPL-CCNC: 25 U/L (ref 10–40)
ANION GAP SERPL CALC-SCNC: 16 MMOL/L (ref 8–19)
AST SERPL-CCNC: 32 U/L (ref 15–50)
BILIRUB SERPL-MCNC: 0.69 MG/DL (ref 0.2–1.3)
BUN SERPL-MCNC: 23 MG/DL (ref 5–25)
BUN/CREAT SERPL: 18 {RATIO} (ref 6–20)
CALCIUM SERPL-MCNC: 9.6 MG/DL (ref 8.7–10.4)
CHLORIDE SERPL-SCNC: 103 MMOL/L (ref 99–111)
CHOLEST SERPL-MCNC: 122 MG/DL (ref 107–200)
CHOLEST/HDLC SERPL: 3.1 {RATIO} (ref 3–6)
CONV CO2: 26 MMOL/L (ref 22–32)
CONV TOTAL PROTEIN: 7 G/DL (ref 6.3–8.2)
CREAT UR-MCNC: 1.25 MG/DL (ref 0.7–1.2)
GFR SERPLBLD BASED ON 1.73 SQ M-ARVRAT: >60 ML/MIN/{1.73_M2}
GLOBULIN UR ELPH-MCNC: 3 G/DL (ref 2–3.5)
GLUCOSE SERPL-MCNC: 93 MG/DL (ref 70–99)
HDLC SERPL-MCNC: 39 MG/DL (ref 40–60)
LDLC SERPL CALC-MCNC: 68 MG/DL (ref 70–100)
OSMOLALITY SERPL CALC.SUM OF ELEC: 295 MOSM/KG (ref 273–304)
POTASSIUM SERPL-SCNC: 4.4 MMOL/L (ref 3.5–5.3)
SODIUM SERPL-SCNC: 141 MMOL/L (ref 135–147)
TRIGL SERPL-MCNC: 75 MG/DL (ref 40–150)
VLDLC SERPL-MCNC: 15 MG/DL (ref 5–37)

## 2020-08-26 ENCOUNTER — OFFICE VISIT CONVERTED (OUTPATIENT)
Dept: CARDIOLOGY | Facility: CLINIC | Age: 65
End: 2020-08-26
Attending: NURSE PRACTITIONER

## 2020-08-26 ENCOUNTER — CONVERSION ENCOUNTER (OUTPATIENT)
Dept: CARDIOLOGY | Facility: CLINIC | Age: 65
End: 2020-08-26

## 2021-02-23 ENCOUNTER — HOSPITAL ENCOUNTER (OUTPATIENT)
Dept: VACCINE CLINIC | Facility: HOSPITAL | Age: 66
Discharge: HOME | End: 2021-02-23
Attending: INTERNAL MEDICINE

## 2021-03-25 ENCOUNTER — HOSPITAL ENCOUNTER (OUTPATIENT)
Dept: VACCINE CLINIC | Facility: HOSPITAL | Age: 66
Discharge: HOME | End: 2021-03-25
Attending: INTERNAL MEDICINE

## 2021-03-25 ENCOUNTER — HOSPITAL ENCOUNTER (OUTPATIENT)
Dept: GENERAL RADIOLOGY | Facility: HOSPITAL | Age: 66
Discharge: HOME | End: 2021-03-25
Attending: NURSE PRACTITIONER

## 2021-03-25 LAB
25(OH)D3 SERPL-MCNC: 34.3 NG/ML (ref 30–100)
ALBUMIN SERPL-MCNC: 3.9 G/DL (ref 3.5–5)
ALBUMIN/GLOB SERPL: 1.3 {RATIO} (ref 1.4–2.6)
ALP SERPL-CCNC: 107 U/L (ref 56–155)
ALT SERPL-CCNC: 22 U/L (ref 10–40)
ANION GAP SERPL CALC-SCNC: 17 MMOL/L (ref 8–19)
APPEARANCE UR: CLEAR
AST SERPL-CCNC: 29 U/L (ref 15–50)
BASOPHILS # BLD AUTO: 0.06 10*3/UL (ref 0–0.2)
BASOPHILS NFR BLD AUTO: 0.7 % (ref 0–3)
BILIRUB SERPL-MCNC: 0.82 MG/DL (ref 0.2–1.3)
BILIRUB UR QL: NEGATIVE
BUN SERPL-MCNC: 24 MG/DL (ref 5–25)
BUN/CREAT SERPL: 21 {RATIO} (ref 6–20)
CALCIUM SERPL-MCNC: 8.9 MG/DL (ref 8.7–10.4)
CHLORIDE SERPL-SCNC: 106 MMOL/L (ref 99–111)
CHOLEST SERPL-MCNC: 119 MG/DL (ref 107–200)
CHOLEST/HDLC SERPL: 3 {RATIO} (ref 3–6)
COLOR UR: YELLOW
CONV ABS IMM GRAN: 0.04 10*3/UL (ref 0–0.2)
CONV BACTERIA: NEGATIVE
CONV CO2: 21 MMOL/L (ref 22–32)
CONV COLLECTION SOURCE (UA): ABNORMAL
CONV IMMATURE GRAN: 0.5 % (ref 0–1.8)
CONV TOTAL PROTEIN: 6.9 G/DL (ref 6.3–8.2)
CONV UROBILINOGEN IN URINE BY AUTOMATED TEST STRIP: 0.2 {EHRLICHU}/DL (ref 0.1–1)
CREAT UR-MCNC: 1.12 MG/DL (ref 0.7–1.2)
DEPRECATED RDW RBC AUTO: 42.5 FL (ref 35.1–43.9)
EOSINOPHIL # BLD AUTO: 0.36 10*3/UL (ref 0–0.7)
EOSINOPHIL # BLD AUTO: 4.3 % (ref 0–7)
ERYTHROCYTE [DISTWIDTH] IN BLOOD BY AUTOMATED COUNT: 12.9 % (ref 11.6–14.4)
GFR SERPLBLD BASED ON 1.73 SQ M-ARVRAT: >60 ML/MIN/{1.73_M2}
GLOBULIN UR ELPH-MCNC: 3 G/DL (ref 2–3.5)
GLUCOSE SERPL-MCNC: 94 MG/DL (ref 70–99)
GLUCOSE UR QL: NEGATIVE MG/DL
HCT VFR BLD AUTO: 45.9 % (ref 42–52)
HDLC SERPL-MCNC: 40 MG/DL (ref 40–60)
HGB BLD-MCNC: 15.2 G/DL (ref 14–18)
HGB UR QL STRIP: ABNORMAL
KETONES UR QL STRIP: ABNORMAL MG/DL
LDLC SERPL CALC-MCNC: 66 MG/DL (ref 70–100)
LEUKOCYTE ESTERASE UR QL STRIP: NEGATIVE
LYMPHOCYTES # BLD AUTO: 2.45 10*3/UL (ref 1–5)
LYMPHOCYTES NFR BLD AUTO: 29 % (ref 20–45)
MCH RBC QN AUTO: 29.9 PG (ref 27–31)
MCHC RBC AUTO-ENTMCNC: 33.1 G/DL (ref 33–37)
MCV RBC AUTO: 90.4 FL (ref 80–96)
MONOCYTES # BLD AUTO: 0.71 10*3/UL (ref 0.2–1.2)
MONOCYTES NFR BLD AUTO: 8.4 % (ref 3–10)
NEUTROPHILS # BLD AUTO: 4.82 10*3/UL (ref 2–8)
NEUTROPHILS NFR BLD AUTO: 57.1 % (ref 30–85)
NITRITE UR QL STRIP: NEGATIVE
NRBC CBCN: 0 % (ref 0–0.7)
OSMOLALITY SERPL CALC.SUM OF ELEC: 294 MOSM/KG (ref 273–304)
PH UR STRIP.AUTO: 5 [PH] (ref 5–8)
PLATELET # BLD AUTO: 244 10*3/UL (ref 130–400)
PMV BLD AUTO: 10.5 FL (ref 9.4–12.4)
POTASSIUM SERPL-SCNC: 4.1 MMOL/L (ref 3.5–5.3)
PROT UR QL: NEGATIVE MG/DL
RBC # BLD AUTO: 5.08 10*6/UL (ref 4.7–6.1)
RBC #/AREA URNS HPF: ABNORMAL /[HPF]
SODIUM SERPL-SCNC: 140 MMOL/L (ref 135–147)
SP GR UR: 1.02 (ref 1–1.03)
T4 FREE SERPL-MCNC: 1 NG/DL (ref 0.9–1.8)
TRIGL SERPL-MCNC: 65 MG/DL (ref 40–150)
TSH SERPL-ACNC: 3.11 M[IU]/L (ref 0.27–4.2)
VLDLC SERPL-MCNC: 13 MG/DL (ref 5–37)
WBC # BLD AUTO: 8.44 10*3/UL (ref 4.8–10.8)
WBC #/AREA URNS HPF: ABNORMAL /[HPF]

## 2021-03-26 LAB
CONV CREATININE URINE, RANDOM: 211.7 MG/DL (ref 10–300)
CONV PROTEIN TO CREATININE RATIO (RANDOM URINE): 0.05 {RATIO} (ref 0–0.1)
PROT UR-MCNC: 9.7 MG/DL

## 2021-03-31 ENCOUNTER — CONVERSION ENCOUNTER (OUTPATIENT)
Dept: CARDIOLOGY | Facility: CLINIC | Age: 66
End: 2021-03-31

## 2021-03-31 ENCOUNTER — OFFICE VISIT CONVERTED (OUTPATIENT)
Dept: CARDIOLOGY | Facility: CLINIC | Age: 66
End: 2021-03-31
Attending: NURSE PRACTITIONER

## 2021-05-13 NOTE — PROGRESS NOTES
Progress Note      Patient Name: Jw Lazcano   Patient ID: [id number]   Sex: Male   YOB: 1955    Primary Care Provider: Gurvinder Jessica MD   Referring Provider: Gurvinder Jessica MD    Visit Date: August 26, 2020    Provider: ZULMA Cunha   Location: [facility]   Location Address: [address] [place] [address], [facility]   [place]  [id number]   Location Phone: [phone]          Chief Complaint     Follow up on coronary artery disease.       History Of Present Illness  REFERRING PROVIDER: Gurvinder Jessica MD   Jw Lazcano is a 65 year old /White male who denies any chest pain or pressure. No palpitations, shortness of breath, swelling, dizziness, syncope, PND, or orthopnea. He was having some leg cramps. He increased the potassium in his diet, and he says that is better. He does complain of fatigue, particularly when working the heat. He says that is not any different than in the past. He walks once or twice a week. He has lost 9 pounds since his last visit. Home blood pressures vary between 109/71 to 133/99.   PAST MEDICAL HISTORY: Chronic kidney disease; Coronary artery disease with bypass x1 and mitral valve repair 12/27/2018; Hypertension; Negative for myocardial infarction.   FAMILY HISTORY: Positive for hypertension. Negative for diabetes mellitus or heart disease.   PSYCHOSOCIAL HISTORY: Denies tobacco use. Rarely consumes alcohol. Denies mood changes or depression.   CURRENT MEDICATIONS: Atorvastatin 40 mg daily; losartan 25 mg 1/2 daily; metoprolol ER 25 mg daily; spironolactone 25 mg every other day; aspirin 81 mg daily.       Review of Systems  · Cardiovascular  o Denies  o : palpitations (fast, fluttering, or skipping beats), swelling (feet, ankles, hands), shortness of breath while walking or lying flat, chest pain or angina pectoris   · Respiratory  o Denies  o : chronic or frequent cough, asthma or wheezing      Vitals  Date Time BP Position Site L\R Cuff  "Size HR RR TEMP (F) WT  HT  BMI kg/m2 BSA m2 O2 Sat        08/26/2020 09:17 /88 Sitting    50 - R   217lbs 0oz 6'  1\" 28.63 2.25           Physical Examination  · Constitutional  o Appearance  o : Awake, alert, in no acute distress.  · Eyes  o Conjunctivae  o : Conjunctivae normal.  · Ears, Nose, Mouth and Throat  o Oral Cavity  o :   § Oral Mucosa  § : Normal.  · Neck  o Jugular Veins  o : No JVD. Good carotid upstroke. No bruits noted.  · Respiratory  o Respiratory  o : Good respiratory effort. Clear to percussion and auscultation.  · Cardiovascular  o Heart  o : PMI slightly displaced. S1, S2 normal. Faint systolic murmur at the apex.   o Peripheral Vascular System  o :   § Extremities  § : Good femoral and pedal pulses. No pedal edema.  · Gastrointestinal  o Abdominal Examination  o : Soft. No tenderness or masses felt. No hepatosplenomegaly. Abdominal aorta is not palpable.  · EKG  o EKG  o : Performed in the office today.  o Indications  o : Bradycardia.  o Results  o : He is bradycardic at a rate of 48 with an incomplete left bundle branch block.  o Comparison  o : Rate is slightly slower than his EKG of August 2019.   · Labs  o Labs  o : BUN 23, creatinine 1.25. Total cholesterol 122, triglycerides 75, HDL 39, LDL 68.          Assessment     1.  Fatigue related to bradycardia.  2.  Chronic kidney disease, stage 3, stable.  3.  Coronary artery disease with previous bypass without angina.  4.  Previous mitral valve repair, stable.       Plan     1.  Decrease metoprolol to 25 mg half-a-tablet one night, alternating with one tab the next night.  2.  Continue other medications.  3.  Follow up in 6 months with EKG and check a Lipid, CMP, and Thyroid at that time.      ZULMA Arce  JF:dickson             Electronically Signed by: Marily Sam-, Other -Author on August 27, 2020 10:27:22 AM  "

## 2021-05-14 VITALS
DIASTOLIC BLOOD PRESSURE: 94 MMHG | SYSTOLIC BLOOD PRESSURE: 122 MMHG | WEIGHT: 225 LBS | HEART RATE: 68 BPM | BODY MASS INDEX: 28.88 KG/M2 | HEIGHT: 74 IN

## 2021-05-14 VITALS
BODY MASS INDEX: 28.76 KG/M2 | HEART RATE: 50 BPM | DIASTOLIC BLOOD PRESSURE: 88 MMHG | WEIGHT: 217 LBS | SYSTOLIC BLOOD PRESSURE: 130 MMHG | HEIGHT: 73 IN

## 2021-05-14 NOTE — PROGRESS NOTES
"   Progress Note      Patient Name: Jw Lazcano   Patient ID: [id number]   Sex: Male   YOB: 1955    Primary Care Provider: Gurvinder Jessica MD   Referring Provider: Gurvinder Jessica MD    Visit Date: March 31, 2021    Provider: ZULMA Cunha   Location: [facility]   Location Address: [address] [place] [address], [facility]   [place]  [id number]   Location Phone: [phone]          Chief Complaint     Fatigue.       History Of Present Illness  REFERRING PROVIDER: Gurvinder Jessica MD   Jw Lazcano is a 66 year old /White male who continues to complain of fatigue. It does not seem to be any better with decreasing his beta-blocker a little bit. He also said he checked his heart rate, and sometimes, it is still in the 40s. He denies any chest pain, palpitations, shortness of breath, swelling, dizziness, syncope, PND, or orthopnea. He has gained about 8 pounds since his last visit.   PAST MEDICAL HISTORY: Chronic kidney disease; Coronary artery disease with bypass x1 and mitral valve repair 12/27/2018; Hypertension; Negative for myocardial infarction.   PSYCHOSOCIAL HISTORY: Rarely uses alcohol. Denies tobacco use.   CURRENT MEDICATIONS: Losartan 25 mg 1/2 tab daily; metoprolol succinate ER 1/2 tablet one day alternating with a whole the next; atorvastatin 40 mg daily; spironolactone 25 mg every other day; aspirin 81 mg daily.      ALLERGIES: Penicillin.       Review of Systems  · Cardiovascular  o Denies  o : palpitations (fast, fluttering, or skipping beats), swelling (feet, ankles, hands), shortness of breath while walking or lying flat, chest pain or angina pectoris   · Respiratory  o Denies  o : chronic or frequent cough      Vitals  Date Time BP Position Site L\R Cuff Size HR RR TEMP (F) WT  HT  BMI kg/m2 BSA m2 O2 Sat FR L/min FiO2 HC       03/31/2021 08:05 /94 Sitting    68 - R   224lbs 16oz 6'  2\" 28.89 2.31             Physical Examination  · Constitutional  o Appearance  o : " Awake, alert, in no acute distress.  · Eyes  o Conjunctivae  o : Conjunctivae normal.  · Ears, Nose, Mouth and Throat  o Oral Cavity  o :   § Oral Mucosa  § : Normal.  · Neck  o Jugular Veins  o : No JVD. Good carotid upstroke. No bruits noted.  · Respiratory  o Respiratory  o : Good respiratory effort. Clear to percussion and auscultation.  · Cardiovascular  o Heart  o : PMI slightly displaced. S1, S2 normal. Faint systolic murmur at the apex.   o Peripheral Vascular System  o :   § Extremities  § : Good femoral and pedal pulses. No pedal edema.  · Gastrointestinal  o Abdominal Examination  o : Soft. No tenderness or masses felt. No hepatosplenomegaly. Abdominal aorta is not palpable.  · EKG  o EKG  o : Performed in the office today.  o Indications  o : Coronary artery disease, Bradycardia.  o Results  o : He is in sinus rhythm with a nonspecific intraventricular conduction delay.   o Comparison  o : Rate is only slightly better than his EKG of August 2020.   · Labs  o Labs  o : Blood sugar 94. BUN 24, creatinine 1.12. Total cholesterol 119, triglycerides 65, HDL 40, LDL 56. Thyroid was normal.           Assessment     1.  Fatigue related to bradycardia.  2.  Coronary artery disease with previous bypass without angina.  3.  Previous mitral valve repair.  4.  Chronic kidney disease, stage 3, improving.  5.  Hyperlipidemia, at goal.  6.  Hypertension, controlled.       Plan     1.  We will repeat an echocardiogram at his next office visit to evaluate his mitral valve repair and monitor his        aortic stenosis.    2.  Continue losartan and spironolactone in view of his hypertension.  3.  Decrease metoprolol to 25 mg half-a-tablet every day in view of his bradycardia.  4.  He is instructed to monitor his blood pressure and heart rate on a regular basis.  If he continues to feel        fatigued and heart rates are still in the 40s, he will call us, and we will consider stopping the metoprolol.  5.  Continue  atorvastatin in view of his hyperlipidemia.  6.  Continue aspirin in view of his hypertension.  7.  Follow up in 8 months with echocardiogram, EKG, and labs.        ZULMA Arce  JF:vm             Electronically Signed by: Marily Sam-, Other -Author on April 9, 2021 09:53:08 PM  Electronically Co-signed by: ZULMA Cunha -Reviewer on April 12, 2021 03:25:39 PM

## 2021-05-15 VITALS
BODY MASS INDEX: 29.29 KG/M2 | DIASTOLIC BLOOD PRESSURE: 70 MMHG | HEART RATE: 54 BPM | HEIGHT: 73 IN | WEIGHT: 221 LBS | SYSTOLIC BLOOD PRESSURE: 104 MMHG

## 2021-05-15 VITALS
DIASTOLIC BLOOD PRESSURE: 74 MMHG | HEIGHT: 73 IN | HEART RATE: 74 BPM | BODY MASS INDEX: 28.89 KG/M2 | SYSTOLIC BLOOD PRESSURE: 98 MMHG | WEIGHT: 218 LBS

## 2021-05-15 VITALS
HEART RATE: 58 BPM | BODY MASS INDEX: 29.95 KG/M2 | WEIGHT: 226 LBS | SYSTOLIC BLOOD PRESSURE: 132 MMHG | HEIGHT: 73 IN | DIASTOLIC BLOOD PRESSURE: 94 MMHG

## 2021-05-16 VITALS
BODY MASS INDEX: 28.49 KG/M2 | HEIGHT: 73 IN | SYSTOLIC BLOOD PRESSURE: 134 MMHG | WEIGHT: 215 LBS | DIASTOLIC BLOOD PRESSURE: 92 MMHG | HEART RATE: 60 BPM

## 2021-05-16 VITALS — WEIGHT: 211 LBS | HEIGHT: 74 IN | BODY MASS INDEX: 27.08 KG/M2 | RESPIRATION RATE: 12 BRPM

## 2021-05-16 VITALS
WEIGHT: 216 LBS | HEIGHT: 73 IN | SYSTOLIC BLOOD PRESSURE: 128 MMHG | HEART RATE: 52 BPM | BODY MASS INDEX: 28.63 KG/M2 | DIASTOLIC BLOOD PRESSURE: 86 MMHG

## 2021-05-16 VITALS
HEIGHT: 73 IN | DIASTOLIC BLOOD PRESSURE: 90 MMHG | WEIGHT: 218 LBS | HEART RATE: 50 BPM | BODY MASS INDEX: 28.89 KG/M2 | SYSTOLIC BLOOD PRESSURE: 138 MMHG

## 2021-05-23 ENCOUNTER — TRANSCRIBE ORDERS (OUTPATIENT)
Dept: CARDIOLOGY | Facility: CLINIC | Age: 66
End: 2021-05-23

## 2021-05-23 DIAGNOSIS — Z98.890 STATUS POST MITRAL VALVE REPAIR: ICD-10-CM

## 2021-05-23 DIAGNOSIS — R00.1 BRADYCARDIA, SINUS: Primary | ICD-10-CM

## 2021-05-28 VITALS
DIASTOLIC BLOOD PRESSURE: 88 MMHG | OXYGEN SATURATION: 99 % | RESPIRATION RATE: 14 BRPM | WEIGHT: 212 LBS | HEIGHT: 74 IN | HEART RATE: 96 BPM | BODY MASS INDEX: 27.64 KG/M2 | WEIGHT: 215.37 LBS | TEMPERATURE: 97.7 F | DIASTOLIC BLOOD PRESSURE: 65 MMHG | HEIGHT: 74 IN | TEMPERATURE: 98.7 F | RESPIRATION RATE: 14 BRPM | HEART RATE: 56 BPM | HEART RATE: 49 BPM | BODY MASS INDEX: 28.81 KG/M2 | DIASTOLIC BLOOD PRESSURE: 91 MMHG | WEIGHT: 224.5 LBS | OXYGEN SATURATION: 100 % | HEIGHT: 73 IN | BODY MASS INDEX: 28.1 KG/M2 | SYSTOLIC BLOOD PRESSURE: 116 MMHG | RESPIRATION RATE: 14 BRPM | SYSTOLIC BLOOD PRESSURE: 119 MMHG | SYSTOLIC BLOOD PRESSURE: 135 MMHG | OXYGEN SATURATION: 100 %

## 2021-05-28 NOTE — PROGRESS NOTES
Patient: HERI VELA     Acct: [id number]     Report: #[id number]  UNIT #: [id number]     : 1955    Encounter Date:2019  PRIMARY CARE: CRISTINO DOMINGUEZ  ***Signed***  --------------------------------------------------------------------------------------------------------------------  Chief Complaint      Encounter Date      2019            Primary Care Provider      CRISTINO DOMINGUEZ            Referring Provider      CRISTINO DOMINGUEZ            Patient Complaint      Patient is complaining of      Pt here for 3m f/u and results, acute bronchitis            VITALS      Height 6 ft 2 in / 187.96 cm      Weight 215 lbs 6 oz / 97.330687 kg      BSA 2.24 m2      BMI 27.7 kg/m2      Temperature 98.7 F / 37.06 C - Oral      Pulse 56      Respirations 14      Blood Pressure 116/65 Sitting, Right Arm      Pulse Oximetry 99%, Room air            HPI      The patient is a very pleasant 64 year old male here today for follow up.            He had CT scan of the chest in 2019 that showed decreasing size of lymph     nodes. The patient once had a cough that is now resolved. He is doing well at     this time and feels that his breathing has returned back to normal. He has no     lower extremity edema and no fever or chills, no weight loss. He does all his     activities of daily living without any difficulty. He has no aggravating or     relieving factors at this time.            ROS      Constitutional:  Complains of: Fatigue; Denies: Fever, Weight gain, Weight loss,    Chills, Insomnia, Other      Respiratory/Breathing:  Denies: Shortness of air, Wheezing, Cough, Hemoptysis,     Pleuritic pain, Other      Endocrine:  Denies: Polydipsia, Polyuria, Heat/cold intolerance, Diabetes, Other      Eyes:  Denies: Blurred vision, Vision Changes, Other      Ears, nose, mouth, throat:  Denies: Mouth lesions, Thrush, Throat pain,     Hoarseness, Allergies/Hay Fever, Post Nasal Drip, Headaches, Recent Head Injury,     Nose Bleeding, Neck Stiffness, Thyroid Mass, Hearing Loss, Ear Fullness, Dry     Mouth, Nasal or Sinus Pain, Dry Lips, Nasal discharge, Nasal congestion, Other      Cardiovascular:  Denies: Palpitations, Syncope, Claudication, Chest Pain, Wake     up Gasping for air, Leg Swelling, Irregular Heart Rate, Cyanosis, Dyspnea on     Exertion, Other      Gastrointestinal:  Denies: Nausea, Constipation, Diarrhea, Abdominal pain,     Vomiting, Difficulty Swallowing, Reflux/Heartburn, Dysphagia, Jaundice,     Bloating, Melena, Bloody stools, Other      Genitourinary:  Denies: Urinary frequency, Incontinence, Hematuria, Urgency,     Nocturia, Dysuria, Testicular problems, Other      Musculoskeletal:  Denies: Joint Pain, Joint Stiffness, Joint Swelling, Myalgias,    Other      Hematologic/lymphatic:  DENIES: Lymphadenopathy, Bruising, Bleeding tendencies,     Other      Neurological:  Denies: Headache, Numbness, Weakness, Seizures, Other      Psychiatric:  Denies: Anxiety, Appropriate Effect, Depression, Other      Sleep:  No: Excessive daytime sleep, Morning Headache?, Snoring, Insomnia?, Stop    breathing at sleep?, Other      Integumentary:  Denies: Rash, Dry skin, Skin Warm to Touch, Other      Immunologic/Allergic:  Denies: Latex allergy, Seasonal allergies, Asthma,     Urticaria, Eczema, Other      Immunization status:  No: Up to date            FAMILY/SOCIAL/MEDICAL HX      Surgical History:  Yes: CABG, Cholecystectomy (UMBILICAL HERNIA REPAIR AT THE     SAME TIME), Nose Surgery (deviated septum), Orthopedic Surgery (LEFT KNEE SCOPE,    ACL REMOVED), Tonsils      Stroke - Family Hx:  Mother      Is Father Still Living?:  No      Is Mother Still Living?:  No      Smoking status:  Never smoker      Anticoagulation Therapy:  Yes      Antibiotic Prophylaxis:  No      Medical History:  Yes: Allergies; No: Arthritis, Asthma, Blood Disease,     Chemotherapy/Cancer, Chronic Bronchitis/COPD, Congestive Heart Failu, Deafness      or Ringing Ears, Diabetes, Seizures, Heart Attack, Hemorrhoids/Rectal Prob, High    Blood Pressure, Shortness Of Breath (chronic cough 1 yr better since surgery),     Sinus Trouble, Miscellaneous Medical/oth      Psychiatric History      None            PREVENTION      Hx Influenza Vaccination:  Yes      Date Influenza Vaccine Given:  Oct 1, 2018      Influenza Vaccine Declined:  No      2 or More Falls Past Year?:  No      Fall Past Year with Injury?:  No      Hx Pneumococcal Vaccination:  No      Encouraged to follow-up with:  PCP regarding preventative exams.      Chart initiated by      Bridgette Chavez MA            ALLERGIES/MEDICATIONS      Allergies:        Coded Allergies:             PENICILLINS (Verified  Allergy, Unknown, DOESN'T KNOW WHAT HAPPENS, 6/ 27/19)      Medications    Last Reconciled on 6/27/19 16:49 by NADEGE HAYS MD      Metoprolol Succinate (Metoprolol Succinate) 50 Mg Tab.er.24h      25 MG PO QDAY, #15 TAB.SR.24H 0 Refills         Reported         3/28/19       Spironolactone (Aldactone) 25 Mg Tablet      25 MG PO Q2D, #30 TAB         Prov: MJ BARRON         1/16/19       Warfarin Sod (Coumadin*) 4 Mg Tablet      6 MG PO QDAY@16, #60 TAB 0 Refills         Reported         1/14/19       Multivitamin (Multivitamins) 1 Each Capsule      1 EACH PO QDAY, CAP         Reported         1/14/19       Lisinopril* (Lisinopril*) 5 Mg Tablet      5 MG PO QDAY, #30 TAB 0 Refills         Reported         1/10/19       Aspirin Chew (Aspirin Children's) 81 Mg Tab.chew      81 MG PO QDAY, #30 TAB 0 Refills         Reported         1/10/19       Atorvastatin (Atorvastatin) 40 Mg Tablet      40 MG PO HS, #30 TAB 0 Refills         Reported         12/21/18      Current Medications      Current Medications Reviewed 6/27/19            EXAM      GEN-patient appears stated age resting comfortable in no acute distress      Eyes-PERRL,  conjunctiva are normal in appearance extraocular muscles are      intact, no scleral icterus      Nasal-both nares are patent turbinates appear normal no polyps seen no nasal     discharge or ulcerations      Ears-tympanic membranes are normal no erythema no bulging, normal to inspection      Lymphatic-no swollen or enlarged cervical nodes, or axillary node, or femoral     nodes, or supraclavicular nodes      Mouth normal dentition, no erythema no ulcerations oropharynx appears normal no     exudate no evidence of postnasal drip, MP(1)      Neck-there are no palpable supraclavicular or cervical adenopathy, thyroid is     normal in appearance no apparent nodules, there is no inspiratory or expiratory     stridor      Respiratory-patient has a current healing midline sternotomy scar, exhibits     normal work of breathing, speaking in full sentences without difficulty, the     chest is normal in appearance, clear to auscultation with no wheezes rales or     rhonchi, chest is normal to percussion on both the right and left sides      Cardiovascular-the heart rate is normal and regular S1 and S2 present with no     murmur or extra heart sounds, there is no JVD or pedal edema present      GI-the abdomen is normal in appearance, bowel sounds present and normal in all     quadrants no hepatosplenomegaly or masses felt      Extremities-no clubbing is present, pulses present in all extremities, capillary     refill time is normal      Musculoskeletal-Normal strength in upper and lower extremities, inspection shows     no evidence of muscle atrophy      Skin-skin is normal in appearance it is warm and dry, no rashes present, no     evidence of cyanosis, palpation reveals no masses      Neurological-the patient is alert and oriented to time place and person, moves     all 4 extremities, normal gait, normal affect and mood, CN2-12 intact      Psych-normal judgment and insight is good, normal mood and affect, alert and     oriented to person, place, and time, and date      Vtials      Vitals:              Height 6 ft 2 in / 187.96 cm           Weight 215 lbs 6 oz / 97.070513 kg           BSA 2.24 m2           BMI 27.7 kg/m2           Temperature 98.7 F / 37.06 C - Oral           Pulse 56           Respirations 14           Blood Pressure 116/65 Sitting, Right Arm           Pulse Oximetry 99%, Room air            REVIEW      Results Reviewed      PCCS Results Reviewed?:  Yes Prev Lab Results, Yes Prev Radiology Results, Yes     Previous [facility] Records            Assessment      Mediastinal adenopathy - R59.0            Notes      Changed Medications      * Warfarin Sod (Coumadin*) 4 MG TABLET: 6 MG PO QDAY@16 #60         Instructions: 8mg day one, 6mg next      New Diagnostics      * Chest W/ Cont CT, 4 Months         Dx: Mediastinal adenopathy - R59.0      ASSESSMENT:       1. Mediastinal lymphadenopathy.      2. Chronic cough resoled      3.  Dyspnea.      4 celiac arerty aneurysm      5 thyroid nodule            PLAN:       1. Obtain CT scan of the chest in 6 months to ensure the lymph nodes continue to     decrease in size.       2. If the patient's thyroid nodule is still present we will plan on doing     ultrasound, the same way with the celiac artery. If the celiac artery has     changed or still present we will plan on making referral to vascular surgery     service.       3. I have personally reviewed laboratory data, imaging and previous medical     records.            Patient Education      Education resources provided:  Yes                 Disclaimer: Converted document may not contain table formatting or lab diagrams. Please see [employer] for the authenticated document.

## 2021-05-28 NOTE — PROGRESS NOTES
Patient: HERI VELA     Acct: [id number]     Report: #[id number]  UNIT #: [id number]     : 1955    Encounter Date:10/17/2019  PRIMARY CARE: CRISTINO DOMINGUEZ  ***Signed***  --------------------------------------------------------------------------------------------------------------------  Chief Complaint      Encounter Date      Oct 17, 2019            Primary Care Provider      CRISTINO DOMINGUEZ            Referring Provider      CRISTINO DOMINGUEZ            Patient Complaint      Patient is complaining of      Pt here for 4m f/u and results, acute bronchitis            VITALS      Height 6 ft 2 in / 187.96 cm      Weight 224 lbs 8 oz / 101.466718 kg      BSA 2.28 m2      BMI 28.8 kg/m2      Temperature 97.7 F / 36.5 C - Temporal      Pulse 49      Respirations 14      Blood Pressure 135/88 Sitting, Right Arm      Pulse Oximetry 100%, Room air            HPI      The patient is a very pleasant 64 year old male here for follow up today.            The patient had CT scan of the chest that showed stable size of lymph nodes with    minimal enlargement in the right hilar lymph node and minimal enlargement of     subcarinal lymph node and overall reduction in size of lymph nodes since 2018.     The patient is doing well at this time, he complains of no shortness of breath,     no cough and no sputum production.            ROS      Constitutional:  Denies: Fatigue, Fever, Weight gain, Weight loss, Chills,     Insomnia, Other      Respiratory/Breathing:  Denies: Shortness of air, Wheezing, Cough, Hemoptysis,     Pleuritic pain, Other      Endocrine:  Denies: Polydipsia, Polyuria, Heat/cold intolerance, Diabetes, Other      Eyes:  Denies: Blurred vision, Vision Changes, Other      Ears, nose, mouth, throat:  Denies: Mouth lesions, Thrush, Throat pain,     Hoarseness, Allergies/Hay Fever, Post Nasal Drip, Headaches, Recent Head Injury,    Nose Bleeding, Neck Stiffness, Thyroid Mass, Hearing Loss, Ear Fullness, Dry      Mouth, Nasal or Sinus Pain, Dry Lips, Nasal discharge, Nasal congestion, Other      Cardiovascular:  Denies: Palpitations, Syncope, Claudication, Chest Pain, Wake     up Gasping for air, Leg Swelling, Irregular Heart Rate, Cyanosis, Dyspnea on     Exertion, Other      Gastrointestinal:  Denies: Nausea, Constipation, Diarrhea, Abdominal pain,     Vomiting, Difficulty Swallowing, Reflux/Heartburn, Dysphagia, Jaundice,     Bloating, Melena, Bloody stools, Other      Genitourinary:  Denies: Urinary frequency, Incontinence, Hematuria, Urgency,     Nocturia, Dysuria, Testicular problems, Other      Musculoskeletal:  Complains of: Joint Stiffness; Denies: Joint Pain, Joint     Swelling, Myalgias, Other      Hematologic/lymphatic:  DENIES: Lymphadenopathy, Bruising, Bleeding tendencies,     Other      Neurological:  Denies: Headache, Numbness, Weakness, Seizures, Other      Psychiatric:  Denies: Anxiety, Appropriate Effect, Depression, Other      Sleep:  No: Excessive daytime sleep, Morning Headache?, Snoring, Insomnia?, Stop    breathing at sleep?, Other      Integumentary:  Denies: Rash, Dry skin, Skin Warm to Touch, Other      Immunologic/Allergic:  Denies: Latex allergy, Seasonal allergies, Asthma,     Urticaria, Eczema, Other      Immunization status:  No: Up to date            FAMILY/SOCIAL/MEDICAL HX      Surgical History:  Yes: CABG, Cholecystectomy (UMBILICAL HERNIA REPAIR AT THE     SAME TIME), Nose Surgery (deviated septum), Orthopedic Surgery (LEFT KNEE SCOPE,    ACL REMOVED), Tonsils      Stroke - Family Hx:  Mother      Is Father Still Living?:  No      Is Mother Still Living?:  No      Smoking status:  Never smoker      Anticoagulation Therapy:  Yes      Antibiotic Prophylaxis:  No      Medical History:  Yes: Allergies; No: Arthritis, Asthma, Blood Disease,     Chemotherapy/Cancer, Chronic Bronchitis/COPD, Congestive Heart Failu, Deafness     or Ringing Ears, Diabetes, Seizures, Heart Attack,  Hemorrhoids/Rectal Prob, High    Blood Pressure, Shortness Of Breath (chronic cough 1 yr better since surgery),     Sinus Trouble, Miscellaneous Medical/oth      Psychiatric History      None            PREVENTION      Hx Influenza Vaccination:  Yes      Date Influenza Vaccine Given:  Oct 1, 2019      Influenza Vaccine Declined:  No      2 or More Falls Past Year?:  No      Fall Past Year with Injury?:  No      Hx Pneumococcal Vaccination:  No      Encouraged to follow-up with:  PCP regarding preventative exams.      Chart initiated by      Bridgette Chavez MA            ALLERGIES/MEDICATIONS      Allergies:        Coded Allergies:             PENICILLINS (Verified  Allergy, Unknown, DOESN'T KNOW WHAT HAPPENS,     10/17/19)      Medications    Last Reconciled on 6/27/19 16:49 by NADEGE HAYS MD      Metoprolol Succinate (Metoprolol Succinate) 50 Mg Tab.er.24h      25 MG PO QDAY, #15 TAB.SR.24H 0 Refills         Reported         3/28/19       Spironolactone (Aldactone) 25 Mg Tablet      25 MG PO Q2D, #30 TAB         Prov: BARRON,CONIBOLISET         1/16/19       Multivitamin (Multivitamins) 1 Each Capsule      1 EACH PO QDAY, CAP         Reported         1/14/19       Lisinopril* (Lisinopril*) 5 Mg Tablet      2.5 MG PO QDAY, #30 TAB 0 Refills         Reported         1/10/19       Aspirin Chew (Aspirin Children's) 81 Mg Tab.chew      81 MG PO QDAY, #30 TAB 0 Refills         Reported         1/10/19       Atorvastatin (Atorvastatin) 40 Mg Tablet      40 MG PO HS, #30 TAB 0 Refills         Reported         12/21/18      Current Medications      Current Medications Reviewed 10/17/19            EXAM      GEN-patient appears stated age resting comfortable in no acute distress      Eyes-PERRL,  conjunctiva are normal in appearance extraocular muscles are     intact, no scleral icterus      Nasal-both nares are patent turbinates appear normal no polyps seen no nasal     discharge or ulcerations      Ears-tympanic membranes are  normal no erythema no bulging, normal to inspection      Lymphatic-no swollen or enlarged cervical nodes, or axillary node, or femoral     nodes, or supraclavicular nodes      Mouth normal dentition, no erythema no ulcerations oropharynx appears normal no     exudate no evidence of postnasal drip, MP(1)      Neck-there are no palpable supraclavicular or cervical adenopathy, thyroid is     normal in appearance no apparent nodules, there is no inspiratory or expiratory     stridor      Respiratory-patient has a current healing midline sternotomy scar, exhibits     normal work of breathing, speaking in full sentences without difficulty, the     chest is normal in appearance, clear to auscultation with no wheezes rales or     rhonchi, chest is normal to percussion on both the right and left sides      Cardiovascular-the heart rate is normal and regular S1 and S2 present with no     murmur or extra heart sounds, there is no JVD or pedal edema present      GI-the abdomen is normal in appearance, bowel sounds present and normal in all     quadrants no hepatosplenomegaly or masses felt      Extremities-no clubbing is present, pulses present in all extremities, capillary     refill time is normal      Musculoskeletal-Normal strength in upper and lower extremities, inspection shows     no evidence of muscle atrophy      Skin-skin is normal in appearance it is warm and dry, no rashes present, no     evidence of cyanosis, palpation reveals no masses      Neurological-the patient is alert and oriented to time place and person, moves     all 4 extremities, normal gait, normal affect and mood, CN2-12 intact      Psych-normal judgment and insight is good, normal mood and affect, alert and     oriented to person, place, and time, and date      Vtials      Vitals:             Height 6 ft 2 in / 187.96 cm           Weight 224 lbs 8 oz / 101.421882 kg           BSA 2.28 m2           BMI 28.8 kg/m2           Temperature 97.7 F / 36.5 C  - Temporal           Pulse 49           Respirations 14           Blood Pressure 135/88 Sitting, Right Arm           Pulse Oximetry 100%, Room air            REVIEW      Results Reviewed      PCCS Results Reviewed?:  Yes Prev Lab Results, Yes Prev Radiology Results, Yes     Previous [facility] Records            Assessment      Notes      Changed Medications      * Lisinopril* 5 MG TABLET: 2.5 MG PO QDAY #30      New Office Procedures      * Flu Vacc Fluarix Quadrivalent, As Soon As Possible         Flu Vacc [id number](6Mos Up)/Pf (Fluarix Quadrivalent [id number] Syringe) 60        MCG/0.5 ML SYRINGE: 60 MICROGRAM INTRAMUSCULARLY Qty 1 SYRINGE      ASSESSMENT/PLAN:      1. Subcentimeter pulmonary nodules in a never smoker. No follow up for this     needed.       2. Minimal lymphadenopathy. Overall lymph nodes are actually decreased in size     from 2018. No further follow up for this needed.       3. We will give the patient influenza vaccine in the office today.       4. We will see the patient back as needed.       5. I have personally reviewed laboratory data, imaging and previous medical     records.            Patient Education      Education resources provided:  Yes            Electronically signed by Zaki Ponce  11/04/2019 16:32       Disclaimer: Converted document may not contain table formatting or lab diagrams. Please see [employer] for the authenticated document.

## 2021-05-28 NOTE — PROGRESS NOTES
Patient: HERI VELA     Acct: [id number]     Report: #[id number]  UNIT #: [id number]     : 1955    Encounter Date:01/10/2019  PRIMARY CARE: CRISTINO DOMINGUEZ  ***Signed***  --------------------------------------------------------------------------------------------------------------------  Chief Complaint      Encounter Date      Jesus 10, 2019            Primary Care Provider      CRISTINO DOMINGUEZ            Referring Provider      CRISTINO DOMINGUEZ            Patient Complaint      Patient is complaining of      New Pt here for 1y of cough            VITALS      Height 6 ft 1 in / 185.42 cm      Weight 212 lbs 0 oz / 96.081999 kg      BSA 2.21 m2      BMI 28.0 kg/m2      Pulse 96      Respirations 14      Blood Pressure 119/91 Sitting, Right Arm      Pulse Oximetry 100%, Room air            HPI      The patient is a very pleasant 63 year old white male never smoker here  today     to evaluated for an abnormal CT of the chest.  The patient was on vacation with     his wife approximately one month ago, noticed some shortness of breath and was     having some cough.  His wife placed her head on his chest and heard a funny     heart sound.  She is a nurse and was concerned given his shortness of breath and    this audible murmur without having to auscultate about a possible cardiac     defect.  The patient subsequently underwent testing and was found to have severe    mitral valve disease and ultimately underwent cardiac surgery on 2018.      The patient seems to be recuperating well at this time. Also the patient     underwent a chest x-ray on 12/10/2018 for shortness of breath as well as cough.     The patient was found to have cardiomegaly with some indeterminant pulmonary     densities and some prevalence of the right paratracheal soft tissue.              The patient underwent a CT scan of the chest with IV contrast on 2018 that    showed subcarinal lymphadenopathy at 1.7 cm, partially calcified  right hilar     lymphadenopathy at 1.3 cm as well as precarinal lymphadenopathy at 0.7 cm and     mild left hilar adenopathy of 1 cm.  The patient has been having chronic cough     for the past several months now, dry in nature, no aggravating factors, no     relieving factors.  He was having some associated shortness of breath with it,     however at this time, it is felt that his shortness of breath probably was heart    failure related given his severe valvular cardiomyopathy.  The patient has no     aggravating or relieving factors for a cough.  He denies having any fevers or     chills at this time. He does have significant exposure to dust and other     particles. Over the course of the past 2-3 months, he has been having some mild     worsening progressing shortness of breath, but it seems to have progressed over     the course of the past month which subsequently unmasks his significant     cardiomyopathy.  Overall, the patient is not tried on any medications for his     cough at this time. He is here today to be evaluated for abnormal CT scan.            ROS      Constitutional:  Denies: Fatigue, Fever, Weight gain, Weight loss, Chills,     Insomnia, Other      Respiratory/Breathing:  Complains of: Cough; Denies: Shortness of air, Wheezing,    Hemoptysis, Pleuritic pain, Other      Endocrine:  Denies: Polydipsia, Polyuria, Heat/cold intolerance, Diabetes, Other      Eyes:  Denies: Blurred vision, Vision Changes, Other      Ears, nose, mouth, throat:  Denies: Mouth lesions, Thrush, Throat pain, Ho    arseness, Allergies/Hay Fever, Post Nasal Drip, Headaches, Recent Head Injury,     Nose Bleeding, Neck Stiffness, Thyroid Mass, Hearing Loss, Ear Fullness, Dry     Mouth, Nasal or Sinus Pain, Dry Lips, Nasal discharge, Nasal congestion, Other      Cardiovascular:  Denies: Palpitations, Syncope, Claudication, Chest Pain, Wake     up Gasping for air, Leg Swelling, Irregular Heart Rate, Cyanosis, Dyspnea on      Exertion, Other      Gastrointestinal:  Denies: Nausea, Constipation, Diarrhea, Abdominal pain,     Vomiting, Difficulty Swallowing, Reflux/Heartburn, Dysphagia, Jaundice,     Bloating, Melena, Bloody stools, Other      Genitourinary:  Denies: Urinary frequency, Incontinence, Hematuria, Urgency,     Nocturia, Dysuria, Testicular problems, Other      Musculoskeletal:  Denies: Joint Pain, Joint Stiffness, Joint Swelling, Myalgias,    Other      Hematologic/lymphatic:  DENIES: Lymphadenopathy, Bruising, Bleeding tendencies,     Other      Neurological:  Denies: Headache, Numbness, Weakness, Seizures, Other      Psychiatric:  Denies: Anxiety, Appropriate Effect, Depression, Other      Sleep:  No: Excessive daytime sleep, Morning Headache?, Snoring, Insomnia?, Stop    breathing at sleep?, Other      Integumentary:  Denies: Rash, Dry skin, Skin Warm to Touch, Other      Immunologic/Allergic:  Denies: Latex allergy, Seasonal allergies, Asthma,     Urticaria, Eczema, Other      Immunization status:  No: Up to date            FAMILY/SOCIAL/MEDICAL HX      Surgical History:  Yes: CABG, Cholecystectomy (UMBILICAL HERNIA REPAIR AT THE     SAME TIME), Nose Surgery (deviated septum), Orthopedic Surgery (LEFT KNEE SCOPE,    ACL REMOVED), Tonsils      Stroke - Family Hx:  Mother      Is Father Still Living?:  No      Is Mother Still Living?:  No      Smoking status:  Never smoker      Anticoagulation Therapy:  Yes      Antibiotic Prophylaxis:  No      Medical History:  Yes: Allergies; No: Arthritis, Asthma, Blood Disease,     Chemotherapy/Cancer, Deafness or Ringing Ears, Diabetes, Seizures,     Hemorrhoids/Rectal Prob, High Blood Pressure, Shortness Of Breath (chronic cough    1 yr), Miscellaneous Medical/oth      Psychiatric History      None            PREVENTION      Hx Influenza Vaccination:  Yes      Date Influenza Vaccine Given:  Oct 1, 2018      Influenza Vaccine Declined:  No      2 or More Falls Past Year?:  No       Fall Past Year with Injury?:  No      Hx Pneumococcal Vaccination:  No      Encouraged to follow-up with:  PCP regarding preventative exams.      Chart initiated by      Bridgette luna MA            ALLERGIES/MEDICATIONS      Allergies:        Coded Allergies:             PENICILLINS (Verified  Allergy, Unknown, DOESN'T KNOW WHAT HAPPENS,     1/10/19)      Medications      Warfarin Sod (Coumadin*) 4 Mg Tablet      4 MG PO QDAY@16, #30 TAB 0 Refills         Reported         1/10/19       Potassium Chloride (Potassium Chloride) 10 Meq Capsule.er      20 MEQ PO QDAY, #60 CAP.ER 0 Refills         Reported         1/10/19       Pantoprazole (Protonix*) 40 Mg Tablet.dr      40 MG PO HS, #30 TAB 0 Refills         Reported         1/10/19       Lisinopril* (Lisinopril*) 5 Mg Tablet      5 MG PO QDAY, #30 TAB 0 Refills         Reported         1/10/19       Hydrocodone/Acetaminophen 5/325 MG (Hydrocodone/Acetaminophen 5/325 MG) 1 Each     Tablet      1 TAB PO Q4H PRN for BREAKTHROUGH PAIN, TAB 0 Refills         Reported         1/10/19       Cyclobenzaprine Hcl (Cyclobenzaprine*) 10 Mg Tablet      10 MG PO TID PRN for MUSCLE SPASMS, TAB 0 Refills         Reported         1/10/19       Aspirin Chew (Aspirin Children's) 81 Mg Tab.chew      81 MG PO QDAY, #30 TAB 0 Refills         Reported         1/10/19       Atorvastatin (Atorvastatin) 40 Mg Tablet      40 MG PO HS, #30 TAB 0 Refills         Reported         12/21/18       Furosemide (Furosemide) 20 Mg Tablet      20 MG PO Q2D, #60 TAB 0 Refills         Reported         12/21/18      Current Medications      Current Medications Reviewed 1/10/19            EXAM      GEN-patient appears stated age resting comfortable in no acute distress      Eyes-PERRL,  conjunctiva are normal in appearance extraocular muscles are     intact, no scleral icterus      Nasal-both nares are patent turbinates appear normal no polyps seen no nasal     discharge or ulcerations      Ears-tympanic  membranes are normal no erythema no bulging, normal to inspection      Lymphatic-no swollen or enlarged cervical nodes, or axillary node, or femoral     nodes, or supraclavicular nodes      Mouth normal dentition, no erythema no ulcerations oropharynx appears normal no     exudate no evidence of postnasal drip, MP(1)      Neck-there are no palpable supraclavicular or cervical adenopathy, thyroid is     normal in appearance no apparent nodules, there is no inspiratory or expiratory     stridor      Respiratory-patient has a current healing midline sternotomy scar, exhibits     normal work of breathing, speaking in full sentences without difficulty, the     chest is normal in appearance, clear to auscultation with no wheezes rales or     rhonchi, chest is normal to percussion on both the right and left sides      Cardiovascular-the heart rate is normal and regular S1 and S2 present with no m    urmur or extra heart sounds, there is no JVD or pedal edema present      GI-the abdomen is normal in appearance, bowel sounds present and normal in all     quadrants no hepatosplenomegaly or masses felt      Extremities-no clubbing is present, pulses present in all extremities, capillary     refill time is normal      Musculoskeletal-Normal strength in upper and lower extremities, inspection shows     no evidence of muscle atrophy      Skin-skin is normal in appearance it is warm and dry, no rashes present, no     evidence of cyanosis, palpation reveals no masses      Neurological-the patient is alert and oriented to time place and person, moves     all 4 extremities, normal gait, normal affect and mood, CN2-12 intact      Psych-normal judgment and insight is good, normal mood and affect, alert and     oriented to person, place, and time, and date      Vtials      Vitals:             Height 6 ft 1 in / 185.42 cm           Weight 212 lbs 0 oz / 96.548559 kg           BSA 2.21 m2           BMI 28.0 kg/m2           Pulse 96            Respirations 14           Blood Pressure 119/91 Sitting, Right Arm           Pulse Oximetry 100%, Room air            REVIEW      Results Reviewed      PCCS Results Reviewed?:  Yes Prev Lab Results, Yes Prev Radiology Results, Yes     Previous [facility] Records            Assessment      Mediastinal adenopathy - R59.0            Notes      New Medications      * Aspirin Chew (Aspirin Children's) 81 MG TAB.CHEW: 81 MG PO QDAY #30      * CYCLOBENZAPRINE HCL (Cyclobenzaprine*) 10 MG TABLET: 10 MG PO TID PRN MUSCLE       SPASMS      * Hydrocodone/Acetaminophen 5/325 MG 1 EACH TABLET: 1 TAB PO Q4H PRN       BREAKTHROUGH PAIN      * Lisinopril* 5 MG TABLET: 5 MG PO QDAY #30      * PANTOPRAZOLE (Protonix*) 40 MG TABLET.DR: 40 MG PO HS #30         Instructions: Take on an empty stomach.      * Potassium Chloride 10 MEQ CAPSULE.ER: 20 MEQ PO QDAY #60      * Warfarin Sod (Coumadin*) 4 MG TABLET: 4 MG PO QDAY@16 #30      New Diagnostics      * Chest W/ Cont CT, 3 Months         Dx: Mediastinal adenopathy - R59.0      * Renal Function Panel, Month         Dx: Mediastinal adenopathy - R59.0      ASSESSMENT:       1. Mediastinal lymphadenopathy.      2. Chronic cough.      3.  Dyspnea.            PLAN:       1. Dyspnea most likely is related to the patient's significant valvular     cardiomyopathy and appears to be improving already.      2. For the patient's mediastinal lymphadenopathy, we will plan on repeating the     CT scan of the chest in three months.  At this time, I feel that this is most     likely granulomatous inflammation, however possibility that this could all be     heart failure related given the patient appeared to be in heart failure at the     time of his last image.  Nonetheless, I do not feel that this is malignancy     related.  I do not wish to get a PET scan at this time because I feel that given     his recent surgery he may have some abnormal uptake.      3.  We will check renal panel prior to CT scan  given his mildly elevated     creatinine.      4.  Chronic cough.  At this time, I am concerned that this could be related to     the patient's histoplasmosis and quite possibly related to his lisinopril.  We     will see patient back at next office visit if he is still having any symptoms     and we will plan on discontinuing his ACE inhibitor.      5.  I have personally reviewed laboratory data, imaging as well as previous     medical records.            Patient Education      Education resources provided:  Yes (adenopathy)                 Disclaimer: Converted document may not contain table formatting or lab diagrams. Please see [facility] for the authenticated document.

## 2021-06-29 RX ORDER — SPIRONOLACTONE 25 MG/1
TABLET ORAL
Qty: 30 TABLET | Refills: 8 | Status: SHIPPED | OUTPATIENT
Start: 2021-06-29 | End: 2021-12-08 | Stop reason: SDUPTHER

## 2021-12-06 ENCOUNTER — LAB (OUTPATIENT)
Dept: LAB | Facility: HOSPITAL | Age: 66
End: 2021-12-06

## 2021-12-06 ENCOUNTER — TRANSCRIBE ORDERS (OUTPATIENT)
Dept: LAB | Facility: HOSPITAL | Age: 66
End: 2021-12-06

## 2021-12-06 DIAGNOSIS — Z79.899 ENCOUNTER FOR LONG-TERM (CURRENT) USE OF OTHER MEDICATIONS: ICD-10-CM

## 2021-12-06 DIAGNOSIS — E78.5 HYPERLIPIDEMIA, UNSPECIFIED HYPERLIPIDEMIA TYPE: ICD-10-CM

## 2021-12-06 DIAGNOSIS — I10 ESSENTIAL HYPERTENSION, MALIGNANT: Primary | ICD-10-CM

## 2021-12-06 DIAGNOSIS — I25.10 DISEASE OF CARDIOVASCULAR SYSTEM: ICD-10-CM

## 2021-12-06 DIAGNOSIS — I10 ESSENTIAL HYPERTENSION, MALIGNANT: ICD-10-CM

## 2021-12-06 PROCEDURE — 80061 LIPID PANEL: CPT

## 2021-12-06 PROCEDURE — 80053 COMPREHEN METABOLIC PANEL: CPT

## 2021-12-06 PROCEDURE — 36415 COLL VENOUS BLD VENIPUNCTURE: CPT

## 2021-12-07 PROBLEM — E78.00 HIGH CHOLESTEROL: Status: ACTIVE | Noted: 2021-12-07

## 2021-12-07 PROBLEM — Z98.890 HISTORY OF MITRAL VALVE REPAIR: Chronic | Status: ACTIVE | Noted: 2021-12-07

## 2021-12-07 PROBLEM — Z98.890 HISTORY OF MITRAL VALVE REPAIR: Status: ACTIVE | Noted: 2021-12-07

## 2021-12-07 PROBLEM — I34.0 MITRAL INCOMPETENCE: Status: RESOLVED | Noted: 2018-12-26 | Resolved: 2021-12-07

## 2021-12-07 PROBLEM — I48.0 PAROXYSMAL ATRIAL FIBRILLATION (HCC): Chronic | Status: ACTIVE | Noted: 2019-02-14

## 2021-12-07 PROBLEM — I10 HIGH BLOOD PRESSURE: Status: ACTIVE | Noted: 2021-12-07

## 2021-12-07 PROBLEM — E78.00 HIGH CHOLESTEROL: Chronic | Status: ACTIVE | Noted: 2021-12-07

## 2021-12-07 PROBLEM — I10 HIGH BLOOD PRESSURE: Chronic | Status: ACTIVE | Noted: 2021-12-07

## 2021-12-07 PROBLEM — I25.10 CORONARY ARTERY DISEASE INVOLVING NATIVE CORONARY ARTERY OF NATIVE HEART WITHOUT ANGINA PECTORIS: Chronic | Status: ACTIVE | Noted: 2018-12-26

## 2021-12-07 LAB
ALBUMIN SERPL-MCNC: 4.3 G/DL (ref 3.5–5.2)
ALBUMIN/GLOB SERPL: 1.6 G/DL
ALP SERPL-CCNC: 118 U/L (ref 39–117)
ALT SERPL W P-5'-P-CCNC: 37 U/L (ref 1–41)
ANION GAP SERPL CALCULATED.3IONS-SCNC: 11.1 MMOL/L (ref 5–15)
AST SERPL-CCNC: 31 U/L (ref 1–40)
BILIRUB SERPL-MCNC: 0.6 MG/DL (ref 0–1.2)
BUN SERPL-MCNC: 15 MG/DL (ref 8–23)
BUN/CREAT SERPL: 16 (ref 7–25)
CALCIUM SPEC-SCNC: 9.1 MG/DL (ref 8.6–10.5)
CHLORIDE SERPL-SCNC: 105 MMOL/L (ref 98–107)
CHOLEST SERPL-MCNC: 129 MG/DL (ref 0–200)
CO2 SERPL-SCNC: 22.9 MMOL/L (ref 22–29)
CREAT SERPL-MCNC: 0.94 MG/DL (ref 0.76–1.27)
GFR SERPL CREATININE-BSD FRML MDRD: 81 ML/MIN/1.73
GLOBULIN UR ELPH-MCNC: 2.7 GM/DL
GLUCOSE SERPL-MCNC: 93 MG/DL (ref 65–99)
HDLC SERPL-MCNC: 36 MG/DL (ref 40–60)
LDLC SERPL CALC-MCNC: 73 MG/DL (ref 0–100)
LDLC/HDLC SERPL: 1.98 {RATIO}
POTASSIUM SERPL-SCNC: 4.7 MMOL/L (ref 3.5–5.2)
PROT SERPL-MCNC: 7 G/DL (ref 6–8.5)
SODIUM SERPL-SCNC: 139 MMOL/L (ref 136–145)
TRIGL SERPL-MCNC: 108 MG/DL (ref 0–150)
VLDLC SERPL-MCNC: 20 MG/DL (ref 5–40)

## 2021-12-08 ENCOUNTER — OFFICE VISIT (OUTPATIENT)
Dept: CARDIOLOGY | Facility: CLINIC | Age: 66
End: 2021-12-08

## 2021-12-08 VITALS
SYSTOLIC BLOOD PRESSURE: 140 MMHG | HEIGHT: 74 IN | DIASTOLIC BLOOD PRESSURE: 78 MMHG | HEART RATE: 50 BPM | BODY MASS INDEX: 29.39 KG/M2 | WEIGHT: 229 LBS

## 2021-12-08 DIAGNOSIS — I50.43 CHF (CONGESTIVE HEART FAILURE), NYHA CLASS III, ACUTE ON CHRONIC, COMBINED (HCC): ICD-10-CM

## 2021-12-08 DIAGNOSIS — Z98.890 HISTORY OF MITRAL VALVE REPAIR: ICD-10-CM

## 2021-12-08 DIAGNOSIS — I10 PRIMARY HYPERTENSION: Chronic | ICD-10-CM

## 2021-12-08 DIAGNOSIS — I48.0 PAROXYSMAL ATRIAL FIBRILLATION (HCC): Chronic | ICD-10-CM

## 2021-12-08 DIAGNOSIS — E78.00 HIGH CHOLESTEROL: Chronic | ICD-10-CM

## 2021-12-08 DIAGNOSIS — I25.10 CORONARY ARTERY DISEASE INVOLVING NATIVE CORONARY ARTERY OF NATIVE HEART WITHOUT ANGINA PECTORIS: Primary | ICD-10-CM

## 2021-12-08 PROCEDURE — 93000 ELECTROCARDIOGRAM COMPLETE: CPT | Performed by: INTERNAL MEDICINE

## 2021-12-08 PROCEDURE — 99214 OFFICE O/P EST MOD 30 MIN: CPT | Performed by: INTERNAL MEDICINE

## 2021-12-08 RX ORDER — LOSARTAN POTASSIUM 25 MG/1
12.5 TABLET ORAL DAILY
COMMUNITY
End: 2021-12-08 | Stop reason: SDUPTHER

## 2021-12-08 RX ORDER — LOSARTAN POTASSIUM 25 MG/1
25 TABLET ORAL DAILY
Qty: 90 TABLET | Refills: 3 | Status: SHIPPED | OUTPATIENT
Start: 2021-12-08 | End: 2022-12-19 | Stop reason: SDUPTHER

## 2021-12-08 RX ORDER — SPIRONOLACTONE 25 MG/1
25 TABLET ORAL EVERY OTHER DAY
Qty: 45 TABLET | Refills: 3 | Status: SHIPPED | OUTPATIENT
Start: 2021-12-08 | End: 2022-07-13

## 2021-12-08 RX ORDER — ASPIRIN 81 MG/1
81 TABLET ORAL EVERY OTHER DAY
COMMUNITY

## 2021-12-08 RX ORDER — METOPROLOL SUCCINATE 25 MG/1
12.5 TABLET, EXTENDED RELEASE ORAL DAILY
Qty: 45 TABLET | Refills: 3 | Status: SHIPPED | OUTPATIENT
Start: 2021-12-08 | End: 2022-06-28

## 2021-12-08 RX ORDER — ATORVASTATIN CALCIUM 80 MG/1
80 TABLET, FILM COATED ORAL DAILY
Qty: 90 TABLET | Refills: 3 | Status: SHIPPED | OUTPATIENT
Start: 2021-12-08 | End: 2022-06-28 | Stop reason: DRUGHIGH

## 2021-12-08 NOTE — ASSESSMENT & PLAN NOTE
Congestive heart failure due to valvular heart disease has improved dramatically.  He has had recovery of his ejection fraction.  He will continue his losartan and increase the dose to 25 mg 1 tablet at bedtime instead of half a tab and continue the spironolactone 25 mg every other day.

## 2021-12-08 NOTE — ASSESSMENT & PLAN NOTE
He is maintaining sinus rhythm and has not had any recurrence.  We will have him continue his aspirin and low-dose beta-blocker.  He has had a -30-day monitor in the past

## 2021-12-08 NOTE — PROGRESS NOTES
Office Visit    Chief Complaint  Coronary Artery Disease, Atrial Fibrillation, Hypertension, and Congestive Heart Failure    Subjective            Jw Lazcano presents to [facility] CARDIOLOGY  Memo is a 66 years old gentleman with coronary disease hypertension hyperlipidemia who is doing very well.  He has not had any episodes of chest pain shortness of breath paroxysmal nocturnal dyspnea orthopnea palpitations dizziness or syncope.      Past Medical History:   Diagnosis Date   • CAD (coronary artery disease)     S/p CABG 12/2018   • Chest pain due to CAD (HCC) 12/2018   • CKD (chronic kidney disease), stage III ([facility])    • Coronary artery disease involving native coronary artery of native heart without angina pectoris 12/26/2018     with bypass x1 and mitral valve repair 12/27/2018   • MCINTOSH (dyspnea on exertion)    • Heart murmur    • High blood pressure 12/7/2021   • High cholesterol 12/7/2021   • History of mitral valve repair 12/7/2021   • Hyperlipidemia     Mild   • Hypertension    • Left atrial enlargement 12/24/2018    Noted on REINALDO   • Mild aortic valve regurgitation 12/24/2018    Noted on REINALDO   • Mitral valve insufficiency     S/p MVR 12/2018   • Mitral valve prolapse 12/24/2018    Moderate to Severe Noted on REINALDO; S/p MVR 12/2018   • Mitral valve regurgitation 12/21/18-[facility]    Severe Noted on Cardiac Cath & REINALDO-12/24/18   • Paroxysmal atrial fibrillation ([facility]) 2/14/2019   • SOB (shortness of breath) 12/2018       Allergies   Allergen Reactions   • Penicillins Rash     Unknown          Past Surgical History:   Procedure Laterality Date   • CARDIAC CATHETERIZATION Bilateral 12/21/2018-[facility]    w/L Ventriculography/Coronary Angiography--Single-Vessel CAD Noted w/Severe MVR   • CHOLECYSTECTOMY     • CORONARY ARTERY BYPASS GRAFT N/A 12/27/2018    Procedure: REINALDO STERNOTOMY CORONARY ARTERY BYPASS GRAFT TIMES 1 USING LEFT INTERNAL MAMMARY ARTERY AND LEFT GREATER SAPHENOUS VEIN GRAFT PER ENDOSCOPIC  VEIN HARVESTING, MITRAL VALVE REPAIR AND PRP;  Surgeon: Miguel Lobo MD;  Location: [facility] OR;  Service: Cardiothoracic   • MITRAL VALVE REPAIR/REPLACEMENT     • REINALDO  12/24/18-[facility]    Moderate-Severe MV Prolapse; Mild Calcific Change; L Atrial Enlargement; EF 65%; Mild AVR Noted; Severe MVR Noted        Social History     Tobacco Use   • Smoking status: Never Smoker   • Smokeless tobacco: Never Used   Substance Use Topics   • Alcohol use: Not on file   • Drug use: No       Family History   Problem Relation Age of Onset   • Valvular heart disease Father         In the 1970's        Prior to Admission medications    Medication Sig Start Date End Date Taking? Authorizing Provider   aspirin 81 MG EC tablet Take 81 mg by mouth Daily.   Yes Lashanda Mirza MD   atorvastatin (LIPITOR) 40 MG tablet Take 40 mg by mouth Daily.   Yes Lashanda Mirza MD   loratadine (CLARITIN) 10 MG tablet Take 10 mg by mouth As Needed for Allergies.   Yes Lashanda Mirza MD   losartan (COZAAR) 25 MG tablet Take 12.5 mg by mouth Daily.   Yes ProviderLashanda MD   metoprolol succinate XL (TOPROL-XL) 25 MG 24 hr tablet Take 0.5 tablets by mouth Daily. 2/14/19  Yes Cyril Murphy MD   Multiple Vitamin (MULTIVITAMIN) tablet tablet Take 1 tablet by mouth Daily.   Yes ProviderLashanda MD   spironolactone (ALDACTONE) 25 MG tablet TAKE 1 TABLET BY MOUTH EVERY OTHER DAY OR AS DIRECTED 6/29/21  Yes Trinidad Galarza APRN   amiodarone (PACERONE) 200 MG tablet Take 200 mg by mouth Daily.    ProviderLashanda MD   furosemide (LASIX) 40 MG tablet Take 1 tablet by mouth Daily.  Patient taking differently: Take 20 mg by mouth As Needed. 1/3/19   Karin Trinh APRN   lisinopril (PRINIVIL,ZESTRIL) 5 MG tablet Take 1 tablet by mouth Daily. 1/3/19   Karin Trinh APRN   warfarin (COUMADIN) 3 MG tablet 3 mg. 2 tabs daily  12/8/21  ProviderLashanda MD        Review of Systems   Constitutional: Negative  "for fatigue.   Respiratory: Positive for cough. Negative for shortness of breath.    Cardiovascular: Negative for chest pain, palpitations and leg swelling.   Neurological: Negative for dizziness.        Objective     /78 (BP Location: Left arm, Patient Position: Sitting, Cuff Size: Adult)   Pulse 50   Ht 188 cm (74\")   Wt 104 kg (229 lb)   BMI 29.40 kg/m²       Physical Exam  Constitutional:       General: He is awake.      Appearance: Normal appearance.   Neck:      Thyroid: No thyromegaly.      Vascular: No carotid bruit or JVD.   Cardiovascular:      Rate and Rhythm: Normal rate and regular rhythm.      Chest Wall: PMI is not displaced.      Pulses: Normal pulses.      Heart sounds: Normal heart sounds, S1 normal and S2 normal. No murmur heard.  No friction rub. No gallop. No S3 or S4 sounds.    Pulmonary:      Effort: Pulmonary effort is normal.      Breath sounds: Normal breath sounds and air entry. No wheezing, rhonchi or rales.   Abdominal:      General: Bowel sounds are normal.      Palpations: Abdomen is soft. There is no mass.      Tenderness: There is no abdominal tenderness.   Musculoskeletal:      Cervical back: Neck supple.      Right lower leg: No edema.      Left lower leg: No edema.   Neurological:      Mental Status: He is alert and oriented to person, place, and time.   Psychiatric:         Mood and Affect: Mood normal.         Behavior: Behavior is cooperative.       Lab Results   Component Value Date    PROBNP 208.9 12/26/2018    BNP 3199 (H) 01/14/2019     CMP    CMP 3/25/21 12/6/21   Glucose 94 93   BUN 24 15   Creatinine 1.12 0.94   eGFR Non African Am  81   Sodium 140 139   Potassium 4.1 4.7   Chloride 106 105   Calcium 8.9 9.1   Albumin 3.9 4.30   Total Bilirubin 0.82 0.6   Alkaline Phosphatase 107 118 (A)   AST (SGOT) 29 31   ALT (SGPT) 22 37   (A) Abnormal value       Comments are available for some flowsheets but are not being displayed.           CBC w/diff    CBC w/Diff " 3/25/21   WBC 8.44   RBC 5.08   Hemoglobin 15.2   Hematocrit 45.9   MCV 90.4   MCH 29.9   MCHC 33.1   RDW 12.9   Platelets 244   Neutrophil Rel % 57.1   Lymphocyte Rel % 29.0   Monocyte Rel % 8.4   Eosinophil Rel % 4.3   Basophil Rel % 0.7            Lipid Panel    Lipid Panel 3/25/21 12/6/21   Total Cholesterol  129   Total Cholesterol 119    Triglycerides 65 108   HDL Cholesterol 40 36 (A)   VLDL Cholesterol 13 20   LDL Cholesterol  66 (A) 73   LDL/HDL Ratio  1.98   (A) Abnormal value       Comments are available for some flowsheets but are not being displayed.            Lab Results   Component Value Date    TSH 3.110 03/25/2021    TSH 4.000 08/21/2019    TSH 4.820 (H) 04/10/2019      Lab Results   Component Value Date    FREET4 1.0 03/25/2021    FREET4 1.2 08/21/2019    FREET4 1.3 04/10/2019      No results found for: DDIMERQUANT  Magnesium   Date Value Ref Range Status   12/28/2018 2.5 (H) 1.6 - 2.4 mg/dL Final      No results found for: DIGOXIN                Result Review :                    ECG 12 Lead    Date/Time: 12/8/2021 11:38 AM  Performed by: Adarsh Bhagat MD  Authorized by: Adarsh Bhagat MD   Comments: Sinus bradycardia noticed specific intraventricular conduction delay no change compared to previous EKG                Assessment and Plan        Diagnoses and all orders for this visit:    1. Coronary artery disease involving native coronary artery of native heart without angina pectoris (Primary)  -     Lipid Panel; Future  -     Comprehensive Metabolic Panel; Future  -     Magnesium; Future  -     Lipid Panel; Future  -     Comprehensive Metabolic Panel; Future  -     Magnesium; Future    2. History of mitral valve repair  -     Lipid Panel; Future  -     Comprehensive Metabolic Panel; Future  -     Magnesium; Future  -     Lipid Panel; Future  -     Comprehensive Metabolic Panel; Future  -     Magnesium; Future    3. CHF (congestive heart failure), NYHA class III, acute on chronic, combined  ([facility])  Assessment & Plan:  Congestive heart failure due to valvular heart disease has improved dramatically.  He has had recovery of his ejection fraction.  He will continue his losartan and increase the dose to 25 mg 1 tablet at bedtime instead of half a tab and continue the spironolactone 25 mg every other day.    Orders:  -     Lipid Panel; Future  -     Comprehensive Metabolic Panel; Future  -     Magnesium; Future  -     Lipid Panel; Future  -     Comprehensive Metabolic Panel; Future  -     Magnesium; Future    4. Paroxysmal atrial fibrillation (HCC)  Assessment & Plan:  He is maintaining sinus rhythm and has not had any recurrence.  We will have him continue his aspirin and low-dose beta-blocker.  He has had a -30-day monitor in the past    Orders:  -     Lipid Panel; Future  -     Comprehensive Metabolic Panel; Future  -     Magnesium; Future  -     Lipid Panel; Future  -     Comprehensive Metabolic Panel; Future  -     Magnesium; Future    5. Primary hypertension  Assessment & Plan:  Hypertension needs tighter control.  I am going to increase his losartan to 25 mg whole tablet a day instead of half a tablet a day.  He will maintain a 2-week blood pressure log starting this coming Monday.  If his blood pressures are not improved we will increase his spironolactone to every day instead of every other day.    Orders:  -     Lipid Panel; Future  -     Comprehensive Metabolic Panel; Future  -     Magnesium; Future  -     Lipid Panel; Future  -     Comprehensive Metabolic Panel; Future  -     Magnesium; Future    6. High cholesterol  -     Lipid Panel; Future  -     Comprehensive Metabolic Panel; Future  -     Magnesium; Future  -     Lipid Panel; Future  -     Comprehensive Metabolic Panel; Future  -     Magnesium; Future    Other orders  -     atorvastatin (LIPITOR) 80 MG tablet; Take 1 tablet by mouth Daily.  Dispense: 90 tablet; Refill: 3  -     metoprolol succinate XL (TOPROL-XL) 25 MG 24 hr tablet; Take 0.5  tablets by mouth Daily.  Dispense: 45 tablet; Refill: 3  -     losartan (COZAAR) 25 MG tablet; Take 1 tablet by mouth Daily.  Dispense: 90 tablet; Refill: 3  -     spironolactone (ALDACTONE) 25 MG tablet; Take 1 tablet by mouth Every Other Day.  Dispense: 45 tablet; Refill: 3          Follow Up     Return in about 6 months (around 6/8/2022) for next ov with Elise.    Patient was given instructions and counseling regarding his condition or for health maintenance advice. Please see specific information pulled into the AVS if appropriate.     Adarsh Bhagat MD  12/08/21 09:05 EST

## 2021-12-08 NOTE — ASSESSMENT & PLAN NOTE
Hypertension needs tighter control.  I am going to increase his losartan to 25 mg whole tablet a day instead of half a tablet a day.  He will maintain a 2-week blood pressure log starting this coming Monday.  If his blood pressures are not improved we will increase his spironolactone to every day instead of every other day.

## 2022-04-27 ENCOUNTER — TRANSCRIBE ORDERS (OUTPATIENT)
Dept: LAB | Facility: HOSPITAL | Age: 67
End: 2022-04-27

## 2022-04-27 DIAGNOSIS — N18.30 STAGE 3 CHRONIC KIDNEY DISEASE, UNSPECIFIED WHETHER STAGE 3A OR 3B CKD: Primary | ICD-10-CM

## 2022-05-04 ENCOUNTER — LAB (OUTPATIENT)
Dept: LAB | Facility: HOSPITAL | Age: 67
End: 2022-05-04

## 2022-05-04 DIAGNOSIS — I25.10 CORONARY ARTERY DISEASE INVOLVING NATIVE CORONARY ARTERY OF NATIVE HEART WITHOUT ANGINA PECTORIS: ICD-10-CM

## 2022-05-04 DIAGNOSIS — E78.00 HIGH CHOLESTEROL: Chronic | ICD-10-CM

## 2022-05-04 DIAGNOSIS — I48.0 PAROXYSMAL ATRIAL FIBRILLATION: Chronic | ICD-10-CM

## 2022-05-04 DIAGNOSIS — I50.43 CHF (CONGESTIVE HEART FAILURE), NYHA CLASS III, ACUTE ON CHRONIC, COMBINED: ICD-10-CM

## 2022-05-04 DIAGNOSIS — Z98.890 HISTORY OF MITRAL VALVE REPAIR: ICD-10-CM

## 2022-05-04 DIAGNOSIS — N18.30 STAGE 3 CHRONIC KIDNEY DISEASE, UNSPECIFIED WHETHER STAGE 3A OR 3B CKD: ICD-10-CM

## 2022-05-04 DIAGNOSIS — I10 PRIMARY HYPERTENSION: Chronic | ICD-10-CM

## 2022-05-04 LAB
ALBUMIN SERPL-MCNC: 4 G/DL (ref 3.5–5.2)
ALBUMIN/GLOB SERPL: 1.4 G/DL
ALP SERPL-CCNC: 121 U/L (ref 39–117)
ALT SERPL W P-5'-P-CCNC: 29 U/L (ref 1–41)
ANION GAP SERPL CALCULATED.3IONS-SCNC: 9 MMOL/L (ref 5–15)
AST SERPL-CCNC: 31 U/L (ref 1–40)
BACTERIA UR QL AUTO: ABNORMAL /HPF
BASOPHILS # BLD AUTO: 0.05 10*3/MM3 (ref 0–0.2)
BASOPHILS NFR BLD AUTO: 0.5 % (ref 0–1.5)
BILIRUB SERPL-MCNC: 0.7 MG/DL (ref 0–1.2)
BILIRUB UR QL STRIP: NEGATIVE
BUN SERPL-MCNC: 22 MG/DL (ref 8–23)
BUN/CREAT SERPL: 18.5 (ref 7–25)
CALCIUM SPEC-SCNC: 9.7 MG/DL (ref 8.6–10.5)
CHLORIDE SERPL-SCNC: 106 MMOL/L (ref 98–107)
CHOLEST SERPL-MCNC: 120 MG/DL (ref 0–200)
CLARITY UR: ABNORMAL
CO2 SERPL-SCNC: 25 MMOL/L (ref 22–29)
COLOR UR: YELLOW
CREAT SERPL-MCNC: 1.19 MG/DL (ref 0.76–1.27)
CREAT UR-MCNC: 185.9 MG/DL
DEPRECATED RDW RBC AUTO: 42.5 FL (ref 37–54)
EGFRCR SERPLBLD CKD-EPI 2021: 67.8 ML/MIN/1.73
EOSINOPHIL # BLD AUTO: 0.54 10*3/MM3 (ref 0–0.4)
EOSINOPHIL NFR BLD AUTO: 5.5 % (ref 0.3–6.2)
ERYTHROCYTE [DISTWIDTH] IN BLOOD BY AUTOMATED COUNT: 12.9 % (ref 12.3–15.4)
GLOBULIN UR ELPH-MCNC: 2.9 GM/DL
GLUCOSE SERPL-MCNC: 79 MG/DL (ref 65–99)
GLUCOSE UR STRIP-MCNC: NEGATIVE MG/DL
HCT VFR BLD AUTO: 46.3 % (ref 37.5–51)
HDLC SERPL-MCNC: 35 MG/DL (ref 40–60)
HGB BLD-MCNC: 15.6 G/DL (ref 13–17.7)
HGB UR QL STRIP.AUTO: NEGATIVE
HYALINE CASTS UR QL AUTO: ABNORMAL /LPF
IMM GRANULOCYTES # BLD AUTO: 0.06 10*3/MM3 (ref 0–0.05)
IMM GRANULOCYTES NFR BLD AUTO: 0.6 % (ref 0–0.5)
KETONES UR QL STRIP: NEGATIVE
LDLC SERPL CALC-MCNC: 69 MG/DL (ref 0–100)
LDLC/HDLC SERPL: 1.97 {RATIO}
LEUKOCYTE ESTERASE UR QL STRIP.AUTO: ABNORMAL
LYMPHOCYTES # BLD AUTO: 2.71 10*3/MM3 (ref 0.7–3.1)
LYMPHOCYTES NFR BLD AUTO: 27.6 % (ref 19.6–45.3)
MAGNESIUM SERPL-MCNC: 2.2 MG/DL (ref 1.6–2.4)
MCH RBC QN AUTO: 30.3 PG (ref 26.6–33)
MCHC RBC AUTO-ENTMCNC: 33.7 G/DL (ref 31.5–35.7)
MCV RBC AUTO: 89.9 FL (ref 79–97)
MONOCYTES # BLD AUTO: 0.86 10*3/MM3 (ref 0.1–0.9)
MONOCYTES NFR BLD AUTO: 8.8 % (ref 5–12)
NEUTROPHILS NFR BLD AUTO: 5.59 10*3/MM3 (ref 1.7–7)
NEUTROPHILS NFR BLD AUTO: 57 % (ref 42.7–76)
NITRITE UR QL STRIP: NEGATIVE
NRBC BLD AUTO-RTO: 0 /100 WBC (ref 0–0.2)
PH UR STRIP.AUTO: 5.5 [PH] (ref 5–8)
PHOSPHATE SERPL-MCNC: 4.3 MG/DL (ref 2.5–4.5)
PLATELET # BLD AUTO: 231 10*3/MM3 (ref 140–450)
PMV BLD AUTO: 10.4 FL (ref 6–12)
POTASSIUM SERPL-SCNC: 4.5 MMOL/L (ref 3.5–5.2)
PROT ?TM UR-MCNC: 7.7 MG/DL
PROT SERPL-MCNC: 6.9 G/DL (ref 6–8.5)
PROT UR QL STRIP: NEGATIVE
PROT/CREAT UR: 0.04 MG/G{CREAT}
PTH-INTACT SERPL-MCNC: 22.6 PG/ML (ref 15–65)
RBC # BLD AUTO: 5.15 10*6/MM3 (ref 4.14–5.8)
RBC # UR STRIP: ABNORMAL /HPF
REF LAB TEST METHOD: ABNORMAL
SODIUM SERPL-SCNC: 140 MMOL/L (ref 136–145)
SP GR UR STRIP: 1.02 (ref 1–1.03)
SQUAMOUS #/AREA URNS HPF: ABNORMAL /HPF
TRIGL SERPL-MCNC: 81 MG/DL (ref 0–150)
UROBILINOGEN UR QL STRIP: ABNORMAL
VLDLC SERPL-MCNC: 16 MG/DL (ref 5–40)
WBC # UR STRIP: ABNORMAL /HPF
WBC NRBC COR # BLD: 9.81 10*3/MM3 (ref 3.4–10.8)

## 2022-05-04 PROCEDURE — 84156 ASSAY OF PROTEIN URINE: CPT

## 2022-05-04 PROCEDURE — 85025 COMPLETE CBC W/AUTO DIFF WBC: CPT

## 2022-05-04 PROCEDURE — 80061 LIPID PANEL: CPT

## 2022-05-04 PROCEDURE — 84100 ASSAY OF PHOSPHORUS: CPT

## 2022-05-04 PROCEDURE — 83735 ASSAY OF MAGNESIUM: CPT

## 2022-05-04 PROCEDURE — 36415 COLL VENOUS BLD VENIPUNCTURE: CPT

## 2022-05-04 PROCEDURE — 83970 ASSAY OF PARATHORMONE: CPT

## 2022-05-04 PROCEDURE — 82570 ASSAY OF URINE CREATININE: CPT

## 2022-05-04 PROCEDURE — 81001 URINALYSIS AUTO W/SCOPE: CPT

## 2022-05-04 PROCEDURE — 80053 COMPREHEN METABOLIC PANEL: CPT

## 2022-06-24 PROBLEM — I25.810 CORONARY ARTERY DISEASE INVOLVING CORONARY BYPASS GRAFT OF NATIVE HEART WITHOUT ANGINA PECTORIS: Chronic | Status: ACTIVE | Noted: 2018-12-26

## 2022-06-24 NOTE — PROGRESS NOTES
Chief Complaint  Coronary Artery Disease, Hypertension, and Hyperlipidemia    Subjective        Jw Lazcano presents to [facility] CARDIOLOGY  Is a 67-year-old male with a history of coronary disease previous bypass surgery and mitral valve repair who comes in now to evaluate his coronary disease, hypertension and hyperlipidemia.  Denies any palpitations or irregular beats.  But he has been complaining of increasing fatigue.  He has been attributing it to his age and the heat. Denies any chest pains, shortness of breath, dizziness, syncope, swelling, PND, or orthopnea.  Cardiac wise he has no further complaints.  He decreased his cholesterol med a little over a month ago due to muscle aches.  That has improved.       Past History:    Past Medical History:   Diagnosis Date   • CAD (coronary artery disease)     S/p CABG 12/2018   • Chest pain due to CAD (HCC) 12/2018   • CKD (chronic kidney disease), stage III (Formerly [facility])    • Coronary artery disease involving coronary bypass graft of native heart without angina pectoris 12/26/2018     with bypass x1 and mitral valve repair 12/27/2018   • Coronary artery disease involving native coronary artery of native heart without angina pectoris 12/26/2018     with bypass x1 and mitral valve repair 12/27/2018   • MCINTOSH (dyspnea on exertion)    • Heart murmur    • High blood pressure 12/7/2021   • High cholesterol 12/7/2021   • History of mitral valve repair 12/7/2021   • Hyperlipidemia     Mild   • Hypertension    • Left atrial enlargement 12/24/2018    Noted on REINALDO   • Mild aortic valve regurgitation 12/24/2018    Noted on REINALDO   • Mitral valve insufficiency     S/p MVR 12/2018   • Mitral valve prolapse 12/24/2018    Moderate to Severe Noted on REINALDO; S/p MVR 12/2018   • Mitral valve regurgitation 12/21/18-[facility]    Severe Noted on Cardiac Cath & REINALDO-12/24/18   • Paroxysmal atrial fibrillation (HCC) 2/14/2019   • SOB (shortness of breath) 12/2018        Family History:  family history includes Valvular heart disease in his father.     Social History: reports that he has never smoked. He has never used smokeless tobacco. He reports that he does not use drugs.    Allergies: Penicillins    Past Surgical History:   Procedure Laterality Date   • CARDIAC CATHETERIZATION Bilateral 12/21/2018-[facility]    w/L Ventriculography/Coronary Angiography--Single-Vessel CAD Noted w/Severe MVR   • CHOLECYSTECTOMY     • CORONARY ARTERY BYPASS GRAFT N/A 12/27/2018    Procedure: REINALDO STERNOTOMY CORONARY ARTERY BYPASS GRAFT TIMES 1 USING LEFT INTERNAL MAMMARY ARTERY AND LEFT GREATER SAPHENOUS VEIN GRAFT PER ENDOSCOPIC VEIN HARVESTING, MITRAL VALVE REPAIR AND PRP;  Surgeon: Miguel Lobo MD;  Location: [facility];  Service: Cardiothoracic   • MITRAL VALVE REPAIR/REPLACEMENT     • REINALDO  12/24/18-[facility]    Moderate-Severe MV Prolapse; Mild Calcific Change; L Atrial Enlargement; EF 65%; Mild AVR Noted; Severe MVR Noted          Current Outpatient Medications:   •  aspirin 81 MG EC tablet, Take 81 mg by mouth Daily., Disp: , Rfl:   •  losartan (COZAAR) 25 MG tablet, Take 1 tablet by mouth Daily., Disp: 90 tablet, Rfl: 3  •  Multiple Vitamin (MULTIVITAMIN) tablet tablet, Take 1 tablet by mouth Daily., Disp: , Rfl:   •  spironolactone (ALDACTONE) 25 MG tablet, Take 1 tablet by mouth Every Other Day., Disp: 45 tablet, Rfl: 3  •  atorvastatin (LIPITOR) 40 MG tablet, Take 1 tablet by mouth Daily., Disp: 90 tablet, Rfl: 3     Medications Discontinued During This Encounter   Medication Reason   • metoprolol succinate XL (TOPROL-XL) 25 MG 24 hr tablet Other- See Medication Note   • loratadine (CLARITIN) 10 MG tablet Discontinued by another clinician   • atorvastatin (LIPITOR) 80 MG tablet Dose adjustment        Review of Systems   Constitutional: Positive for fatigue.   Respiratory: Negative for cough and shortness of breath.    Cardiovascular: Negative for chest pain, palpitations and leg swelling.   Neurological:  "Negative for dizziness.   All other systems reviewed and are negative.       Objective     Physical Exam  Constitutional:       General: He is not in acute distress.     Appearance: Normal appearance.   Neck:      Vascular: No carotid bruit.   Cardiovascular:      Rate and Rhythm: Normal rate and regular rhythm.      Chest Wall: PMI is displaced.      Heart sounds: Murmur (Faint at the apex) heard.   Pulmonary:      Effort: Pulmonary effort is normal.      Breath sounds: Normal breath sounds.   Musculoskeletal:      Right lower leg: No edema.      Left lower leg: No edema.   Neurological:      Mental Status: He is alert.       /82   Pulse (!) 49   Ht 188 cm (74\")   Wt 98 kg (216 lb)   BMI 27.73 kg/m²       Vitals:    06/28/22 0756   BP: 123/82   Pulse: (!) 49       Result Review :         The following data was reviewed by: ZULMA Lima on 06/28/2022:      Lab Results   Component Value Date    PROBNP 208.9 12/26/2018    BNP 3199 (H) 01/14/2019     CMP    CMP 12/6/21 5/4/22   Glucose 93 79   BUN 15 22   Creatinine 0.94 1.19   eGFR Non African Am 81    Sodium 139 140   Potassium 4.7 4.5   Chloride 105 106   Calcium 9.1 9.7   Albumin 4.30 4.00   Total Bilirubin 0.6 0.7   Alkaline Phosphatase 118 (A) 121 (A)   AST (SGOT) 31 31   ALT (SGPT) 37 29   (A) Abnormal value       Comments are available for some flowsheets but are not being displayed.           CBC w/diff    CBC w/Diff 5/4/22   WBC 9.81   RBC 5.15   Hemoglobin 15.6   Hematocrit 46.3   MCV 89.9   MCH 30.3   MCHC 33.7   RDW 12.9   Platelets 231   Neutrophil Rel % 57.0   Immature Granulocyte Rel % 0.6 (A)   Lymphocyte Rel % 27.6   Monocyte Rel % 8.8   Eosinophil Rel % 5.5   Basophil Rel % 0.5   (A) Abnormal value             Lipid Panel    Lipid Panel 12/6/21 5/4/22   Total Cholesterol 129 120   Triglycerides 108 81   HDL Cholesterol 36 (A) 35 (A)   VLDL Cholesterol 20 16   LDL Cholesterol  73 69   LDL/HDL Ratio 1.98 1.97   (A) Abnormal value  "            Lab Results   Component Value Date    TSH 3.110 03/25/2021    TSH 4.000 08/21/2019    TSH 4.820 (H) 04/10/2019      Lab Results   Component Value Date    FREET4 1.0 03/25/2021    FREET4 1.2 08/21/2019    FREET4 1.3 04/10/2019      Magnesium   Date Value Ref Range Status   05/04/2022 2.2 1.6 - 2.4 mg/dL Final            ECG 12 Lead    Date/Time: 6/28/2022 8:28 AM  Performed by: Trinidad Galarza APRN  Authorized by: Trinidad Galarza APRN   Comparison: compared with previous ECG from 12/26/2021  Similar to previous ECG  Rhythm: sinus bradycardia  Rate: bradycardic  BPM: 49  Conduction: non-specific intraventricular conduction delay    Clinical impression: abnormal EKG             Assessment and Plan    Diagnoses and all orders for this visit:    1. High cholesterol (Primary)  Assessment & Plan:  Mixed with LDL at goal in May.  Patient decreased his atorvastatin right before the last lab.  Instructed to try taking atorvastatin 40 mg every day.  Check lipid and CMP in 3 months.    Orders:  -     atorvastatin (LIPITOR) 40 MG tablet; Take 1 tablet by mouth Daily.  Dispense: 90 tablet; Refill: 3  -     Lipid Panel; Future  -     Comprehensive Metabolic Panel; Future    2. Primary hypertension  Assessment & Plan:  Controlled.  Continue spironolactone 25 mg every other day and losartan 25 mg.      3. Paroxysmal atrial fibrillation (HCC)  Assessment & Plan:  No recent episodes.  We will get a stop metoprolol due to bradycardia and fatigue.  We will do a 7-day Holter in 3 weeks to evaluate for any atrial arrhythmias and his bradycardia    Orders:  -     Holter Monitor - 72 Hour Up To 15 Days; Future    4. Coronary artery disease involving coronary bypass graft of native heart without angina pectoris  Assessment & Plan:  Without angina.  Continue aspirin 81.      5. History of mitral valve repair  Assessment & Plan:  Echo from December was reviewed.  No shortness of breath noted.  We will continue to  monitor      Other orders  -     ECG 12 Lead          Follow Up {Instructions Charge Capture  Follow-up Communications :23}    Return in about 6 months (around 12/28/2022) for Dr Black.    Patient was given instructions and counseling regarding his condition or for health maintenance advice. Please see specific information pulled into the AVS if appropriate.       ZULMA Arce  06/28/22 15:34 EDT

## 2022-06-28 ENCOUNTER — OFFICE VISIT (OUTPATIENT)
Dept: CARDIOLOGY | Facility: CLINIC | Age: 67
End: 2022-06-28

## 2022-06-28 VITALS
WEIGHT: 216 LBS | DIASTOLIC BLOOD PRESSURE: 82 MMHG | HEART RATE: 49 BPM | HEIGHT: 74 IN | BODY MASS INDEX: 27.72 KG/M2 | SYSTOLIC BLOOD PRESSURE: 123 MMHG

## 2022-06-28 DIAGNOSIS — I25.810 CORONARY ARTERY DISEASE INVOLVING CORONARY BYPASS GRAFT OF NATIVE HEART WITHOUT ANGINA PECTORIS: Chronic | ICD-10-CM

## 2022-06-28 DIAGNOSIS — I48.0 PAROXYSMAL ATRIAL FIBRILLATION: Chronic | ICD-10-CM

## 2022-06-28 DIAGNOSIS — Z98.890 HISTORY OF MITRAL VALVE REPAIR: Chronic | ICD-10-CM

## 2022-06-28 DIAGNOSIS — I10 PRIMARY HYPERTENSION: Chronic | ICD-10-CM

## 2022-06-28 DIAGNOSIS — E78.00 HIGH CHOLESTEROL: Primary | Chronic | ICD-10-CM

## 2022-06-28 PROCEDURE — 99214 OFFICE O/P EST MOD 30 MIN: CPT | Performed by: NURSE PRACTITIONER

## 2022-06-28 PROCEDURE — 93000 ELECTROCARDIOGRAM COMPLETE: CPT | Performed by: NURSE PRACTITIONER

## 2022-06-28 RX ORDER — ATORVASTATIN CALCIUM 40 MG/1
40 TABLET, FILM COATED ORAL DAILY
Qty: 90 TABLET | Refills: 3 | Status: SHIPPED | OUTPATIENT
Start: 2022-06-28

## 2022-06-28 NOTE — ASSESSMENT & PLAN NOTE
No recent episodes.  We will get a stop metoprolol due to bradycardia and fatigue.  We will do a 7-day Holter in 3 weeks to evaluate for any atrial arrhythmias and his bradycardia

## 2022-06-28 NOTE — ASSESSMENT & PLAN NOTE
Mixed with LDL at goal in May.  Patient decreased his atorvastatin right before the last lab.  Instructed to try taking atorvastatin 40 mg every day.  Check lipid and CMP in 3 months.

## 2022-07-13 RX ORDER — SPIRONOLACTONE 25 MG/1
TABLET ORAL
Qty: 45 TABLET | Refills: 1 | Status: SHIPPED | OUTPATIENT
Start: 2022-07-13 | End: 2023-01-25 | Stop reason: SDUPTHER

## 2022-07-28 ENCOUNTER — TELEPHONE (OUTPATIENT)
Dept: CARDIOLOGY | Facility: CLINIC | Age: 67
End: 2022-07-28

## 2022-07-28 NOTE — TELEPHONE ENCOUNTER
----- Message from ZULMA Lima sent at 7/26/2022  7:48 AM EDT -----  Holter monitor indicated several episodes of atrial fibrillation and atrial flutter.  Needs to start Eliquis 5 mg twice daily.  Decrease aspirin 81 mg every other day.  Heart rate was low at times at 43 but average 60's of bradycardia is improving.  Check CMP and CBC in 1 month

## 2022-08-30 ENCOUNTER — LAB (OUTPATIENT)
Dept: LAB | Facility: HOSPITAL | Age: 67
End: 2022-08-30

## 2022-08-30 DIAGNOSIS — I48.0 PAROXYSMAL ATRIAL FIBRILLATION: ICD-10-CM

## 2022-08-30 LAB
ALBUMIN SERPL-MCNC: 4.2 G/DL (ref 3.5–5.2)
ALBUMIN/GLOB SERPL: 1.6 G/DL
ALP SERPL-CCNC: 112 U/L (ref 39–117)
ALT SERPL W P-5'-P-CCNC: 27 U/L (ref 1–41)
ANION GAP SERPL CALCULATED.3IONS-SCNC: 10.9 MMOL/L (ref 5–15)
AST SERPL-CCNC: 36 U/L (ref 1–40)
BASOPHILS # BLD AUTO: 0.07 10*3/MM3 (ref 0–0.2)
BASOPHILS NFR BLD AUTO: 0.7 % (ref 0–1.5)
BILIRUB SERPL-MCNC: 0.7 MG/DL (ref 0–1.2)
BUN SERPL-MCNC: 24 MG/DL (ref 8–23)
BUN/CREAT SERPL: 18.9 (ref 7–25)
CALCIUM SPEC-SCNC: 8.9 MG/DL (ref 8.6–10.5)
CHLORIDE SERPL-SCNC: 105 MMOL/L (ref 98–107)
CO2 SERPL-SCNC: 24.1 MMOL/L (ref 22–29)
CREAT SERPL-MCNC: 1.27 MG/DL (ref 0.76–1.27)
DEPRECATED RDW RBC AUTO: 42 FL (ref 37–54)
EGFRCR SERPLBLD CKD-EPI 2021: 62.7 ML/MIN/1.73
EOSINOPHIL # BLD AUTO: 0.87 10*3/MM3 (ref 0–0.4)
EOSINOPHIL NFR BLD AUTO: 8.9 % (ref 0.3–6.2)
ERYTHROCYTE [DISTWIDTH] IN BLOOD BY AUTOMATED COUNT: 12.8 % (ref 12.3–15.4)
GLOBULIN UR ELPH-MCNC: 2.6 GM/DL
GLUCOSE SERPL-MCNC: 109 MG/DL (ref 65–99)
HCT VFR BLD AUTO: 44.7 % (ref 37.5–51)
HGB BLD-MCNC: 15.3 G/DL (ref 13–17.7)
IMM GRANULOCYTES # BLD AUTO: 0.03 10*3/MM3 (ref 0–0.05)
IMM GRANULOCYTES NFR BLD AUTO: 0.3 % (ref 0–0.5)
LYMPHOCYTES # BLD AUTO: 2.31 10*3/MM3 (ref 0.7–3.1)
LYMPHOCYTES NFR BLD AUTO: 23.6 % (ref 19.6–45.3)
MCH RBC QN AUTO: 30.5 PG (ref 26.6–33)
MCHC RBC AUTO-ENTMCNC: 34.2 G/DL (ref 31.5–35.7)
MCV RBC AUTO: 89 FL (ref 79–97)
MONOCYTES # BLD AUTO: 0.77 10*3/MM3 (ref 0.1–0.9)
MONOCYTES NFR BLD AUTO: 7.9 % (ref 5–12)
NEUTROPHILS NFR BLD AUTO: 5.74 10*3/MM3 (ref 1.7–7)
NEUTROPHILS NFR BLD AUTO: 58.6 % (ref 42.7–76)
NRBC BLD AUTO-RTO: 0 /100 WBC (ref 0–0.2)
PLATELET # BLD AUTO: 250 10*3/MM3 (ref 140–450)
PMV BLD AUTO: 10.7 FL (ref 6–12)
POTASSIUM SERPL-SCNC: 4.2 MMOL/L (ref 3.5–5.2)
PROT SERPL-MCNC: 6.8 G/DL (ref 6–8.5)
RBC # BLD AUTO: 5.02 10*6/MM3 (ref 4.14–5.8)
SODIUM SERPL-SCNC: 140 MMOL/L (ref 136–145)
WBC NRBC COR # BLD: 9.79 10*3/MM3 (ref 3.4–10.8)

## 2022-08-30 PROCEDURE — 36415 COLL VENOUS BLD VENIPUNCTURE: CPT

## 2022-08-30 PROCEDURE — 80053 COMPREHEN METABOLIC PANEL: CPT

## 2022-08-30 PROCEDURE — 85025 COMPLETE CBC W/AUTO DIFF WBC: CPT

## 2022-08-31 ENCOUNTER — TELEPHONE (OUTPATIENT)
Dept: CARDIOLOGY | Facility: CLINIC | Age: 67
End: 2022-08-31

## 2022-08-31 NOTE — TELEPHONE ENCOUNTER
----- Message from ZULMA Ramirez sent at 8/31/2022  4:53 AM EDT -----    ----- Message -----  From: Lab, Background User  Sent: 8/30/2022   6:50 PM EDT  To: ZULMA Lima

## 2022-12-19 RX ORDER — LOSARTAN POTASSIUM 25 MG/1
25 TABLET ORAL DAILY
Qty: 90 TABLET | Refills: 3 | Status: SHIPPED | OUTPATIENT
Start: 2022-12-19

## 2022-12-29 ENCOUNTER — OFFICE VISIT (OUTPATIENT)
Dept: CARDIOLOGY | Facility: CLINIC | Age: 67
End: 2022-12-29

## 2022-12-29 VITALS
BODY MASS INDEX: 28.57 KG/M2 | SYSTOLIC BLOOD PRESSURE: 129 MMHG | DIASTOLIC BLOOD PRESSURE: 88 MMHG | WEIGHT: 222.6 LBS | HEIGHT: 74 IN | HEART RATE: 57 BPM

## 2022-12-29 DIAGNOSIS — I25.810 CORONARY ARTERY DISEASE INVOLVING CORONARY BYPASS GRAFT OF NATIVE HEART WITHOUT ANGINA PECTORIS: Chronic | ICD-10-CM

## 2022-12-29 DIAGNOSIS — I48.0 PAROXYSMAL ATRIAL FIBRILLATION: Chronic | ICD-10-CM

## 2022-12-29 DIAGNOSIS — Z98.890 HISTORY OF MITRAL VALVE REPAIR: Primary | Chronic | ICD-10-CM

## 2022-12-29 DIAGNOSIS — I10 PRIMARY HYPERTENSION: Chronic | ICD-10-CM

## 2022-12-29 DIAGNOSIS — E78.00 HIGH CHOLESTEROL: Chronic | ICD-10-CM

## 2022-12-29 PROCEDURE — 99213 OFFICE O/P EST LOW 20 MIN: CPT | Performed by: INTERNAL MEDICINE

## 2022-12-29 NOTE — PROGRESS NOTES
Chief Complaint  Congestive Heart Failure, Coronary Artery Disease, Hyperlipidemia, and Hypertension    Subjective        Jw Lazcano presents to [facility] CARDIOLOGY  History of present illness:    Patient states overall he is doing well.  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  He does some walking but does not have a regular exercise program.  When he was walking around he notes no chest pain.  He denies any palpitations or bleeding problems.  He notes no lower extremity edema.      Past Medical History:   Diagnosis Date   • CAD (coronary artery disease)     S/p CABG 12/2018   • Chest pain due to CAD (HCC) 12/2018   • CKD (chronic kidney disease), stage III ([facility])    • Coronary artery disease involving coronary bypass graft of native heart without angina pectoris 12/26/2018     with bypass x1 and mitral valve repair 12/27/2018   • Coronary artery disease involving native coronary artery of native heart without angina pectoris 12/26/2018     with bypass x1 and mitral valve repair 12/27/2018   • MCINTOSH (dyspnea on exertion)    • Heart murmur    • High blood pressure 12/7/2021   • High cholesterol 12/7/2021   • History of mitral valve repair 12/7/2021   • Hyperlipidemia     Mild   • Hypertension    • Left atrial enlargement 12/24/2018    Noted on REINALDO   • Mild aortic valve regurgitation 12/24/2018    Noted on REINALDO   • Mitral valve insufficiency     S/p MVR 12/2018   • Mitral valve prolapse 12/24/2018    Moderate to Severe Noted on REINALDO; S/p MVR 12/2018   • Mitral valve regurgitation 12/21/18-[facility]    Severe Noted on Cardiac Cath & REINALDO-12/24/18   • Paroxysmal atrial fibrillation ([facility]) 2/14/2019   • SOB (shortness of breath) 12/2018         Past Surgical History:   Procedure Laterality Date   • CARDIAC CATHETERIZATION Bilateral 12/21/2018-[facility]    w/L Ventriculography/Coronary Angiography--Single-Vessel CAD Noted w/Severe MVR   • CHOLECYSTECTOMY     • CORONARY ARTERY BYPASS GRAFT N/A 12/27/2018    Procedure: REINALDO  "STERNOTOMY CORONARY ARTERY BYPASS GRAFT TIMES 1 USING LEFT INTERNAL MAMMARY ARTERY AND LEFT GREATER SAPHENOUS VEIN GRAFT PER ENDOSCOPIC VEIN HARVESTING, MITRAL VALVE REPAIR AND PRP;  Surgeon: Miguel Lobo MD;  Location: [facility] OR;  Service: Cardiothoracic   • MITRAL VALVE REPAIR/REPLACEMENT     • REINALDO  12/24/18-[facility]    Moderate-Severe MV Prolapse; Mild Calcific Change; L Atrial Enlargement; EF 65%; Mild AVR Noted; Severe MVR Noted          Social History     Socioeconomic History   • Marital status:    Tobacco Use   • Smoking status: Never   • Smokeless tobacco: Never   Substance and Sexual Activity   • Drug use: No   • Sexual activity: Defer         Family History   Problem Relation Age of Onset   • Valvular heart disease Father         In the 1970's          Allergies   Allergen Reactions   • Penicillins Rash            Current Outpatient Medications:   •  apixaban (ELIQUIS) 5 MG tablet tablet, Take 1 tablet by mouth 2 (Two) Times a Day., Disp: 90 tablet, Rfl: 3  •  aspirin 81 MG EC tablet, Take 81 mg by mouth Every Other Day., Disp: , Rfl:   •  atorvastatin (LIPITOR) 40 MG tablet, Take 1 tablet by mouth Daily., Disp: 90 tablet, Rfl: 3  •  losartan (COZAAR) 25 MG tablet, Take 1 tablet by mouth Daily. (Patient taking differently: Take 25 mg by mouth Every Other Day.), Disp: 90 tablet, Rfl: 3  •  Multiple Vitamin (MULTIVITAMIN) tablet tablet, Take 1 tablet by mouth Daily., Disp: , Rfl:   •  spironolactone (ALDACTONE) 25 MG tablet, TAKE 1 TABLET BY MOUTH EVERY OTHER DAY OR AS DIRECTED, Disp: 45 tablet, Rfl: 1      ROS:  Cardiac review of systems negative    Objective     /88   Pulse 57   Ht 188 cm (74\")   Wt 101 kg (222 lb 9.6 oz)   BMI 28.58 kg/m²       General Appearance:   · well developed  · well nourished  HENT:   · oropharynx moist  · lips not cyanotic  Respiratory:  · no respiratory distress  · normal breath sounds  · no rales  Cardiovascular:  · no jugular venous " distention  · regular rhythm  · S1 normal, S2 normal  · no S3, no S4   · no murmur  · no rub, no thrill  · No carotid bruit  · pedal pulses normal  · lower extremity edema: none    Musculoskeletal:  · no clubbing of fingers.   · normocephalic, head atraumatic  Skin:   · warm, dry  Psychiatric:  · judgement and insight appropriate  · normal mood and affect    ECHO:  Results for orders placed in visit on 12/08/21    Adult Transthoracic Echo Complete w/ Color, Spectral and Contrast if necessary per protocol    Interpretation Summary  · Left ventricular wall thickness is consistent with mild concentric hypertrophy.  · Estimated left ventricular EF was in agreement with the calculated left ventricular EF. Left ventricular ejection fraction appears to be 56 - 60%.  · Left ventricular diastolic function is consistent with (grade I) impaired relaxation.  · Left atrial volume is mildly increased.  · There is mild mitral regurgitation and trace aortic valve regurgitation noted    STRESS:    CATH:  No results found for this or any previous visit.    BMP:   Glucose   Date Value Ref Range Status   08/30/2022 109 (H) 65 - 99 mg/dL Final     BUN   Date Value Ref Range Status   08/30/2022 24 (H) 8 - 23 mg/dL Final     Creatinine   Date Value Ref Range Status   08/30/2022 1.27 0.76 - 1.27 mg/dL Final     Sodium   Date Value Ref Range Status   08/30/2022 140 136 - 145 mmol/L Final     Potassium   Date Value Ref Range Status   08/30/2022 4.2 3.5 - 5.2 mmol/L Final     Chloride   Date Value Ref Range Status   08/30/2022 105 98 - 107 mmol/L Final     CO2   Date Value Ref Range Status   08/30/2022 24.1 22.0 - 29.0 mmol/L Final     Calcium   Date Value Ref Range Status   08/30/2022 8.9 8.6 - 10.5 mg/dL Final     BUN/Creatinine Ratio   Date Value Ref Range Status   08/30/2022 18.9 7.0 - 25.0 Final     Anion Gap   Date Value Ref Range Status   08/30/2022 10.9 5.0 - 15.0 mmol/L Final     eGFR   Date Value Ref Range Status   08/30/2022 62.7  >60.0 mL/min/1.73 Final     Comment:     National Kidney Foundation and American Society of Nephrology (ASN) Task Force recommended calculation based on the Chronic Kidney Disease Epidemiology Collaboration (CKD-EPI) equation refit without adjustment for race.     LIPIDS:  Total Cholesterol   Date Value Ref Range Status   05/04/2022 120 0 - 200 mg/dL Final     Triglycerides   Date Value Ref Range Status   05/04/2022 81 0 - 150 mg/dL Final     HDL Cholesterol   Date Value Ref Range Status   05/04/2022 35 (L) 40 - 60 mg/dL Final     LDL Cholesterol    Date Value Ref Range Status   05/04/2022 69 0 - 100 mg/dL Final     VLDL Cholesterol   Date Value Ref Range Status   05/04/2022 16 5 - 40 mg/dL Final     LDL/HDL Ratio   Date Value Ref Range Status   05/04/2022 1.97  Final         Procedures             ASSESSMENT:  Encounter Diagnoses   Name Primary?   • History of mitral valve repair Yes   • Coronary artery disease involving coronary bypass graft of native heart without angina pectoris    • Paroxysmal atrial fibrillation (HCC)    • Primary hypertension    • High cholesterol          PLAN:    1.  Continue the Eliquis.  The patient notes no problems with bleeding.  I do want to go over his Holter monitor results from back in June where he was diagnosed with the atrial fibrillation.  2.  Blood pressures under good control.  I told him he probably does not need both the losartan and spironolactone for his blood pressure.  His EF was normal back in December 2021.  If he ever wanted to come off the spironolactone and just titrate up the losartan as needed I think this would be fine.  3.  Continue the Lipitor.  Patient had cholesterol checked 5/4/2022 with LDL 69 HDL 35 and triglycerides 81.  These are under excellent control.  4.  Patient had an echocardiogram done 12/8/2021 which showed normal ejection fraction.  It showed the repair of the mitral valve with only mild mitral regurgitation.  5.  Encourage the patient to  start a regular exercise program 30 minutes 5 days a week.  6.  Patient overall is doing well from a heart standpoint.  We will see back in 1 year, sooner if needed.      Looked at patient's Holter monitor.  He certainly has an SVT that looks pretty regular.  He has a wide-complex tachycardia that appears pretty regular but increases in R-R interval.  Next time we see him we may consider having him see an electrophysiologist.  His monitor was read as atrial fibrillation and he has been put on the Eliquis.  He also has a history and in the notes of the junctional rhythm and nonsustained VT.    Patient was given instructions and counseling regarding his condition or for health maintenance advice. Please see specific information pulled into the AVS if appropriate.         Wayne Black MD   12/29/2022  13:32 EST

## 2023-01-18 ENCOUNTER — TELEPHONE (OUTPATIENT)
Dept: GASTROENTEROLOGY | Facility: CLINIC | Age: 68
End: 2023-01-18
Payer: COMMERCIAL

## 2023-01-18 ENCOUNTER — PREP FOR SURGERY (OUTPATIENT)
Dept: OTHER | Facility: HOSPITAL | Age: 68
End: 2023-01-18
Payer: COMMERCIAL

## 2023-01-18 DIAGNOSIS — Z12.11 ENCOUNTER FOR SCREENING COLONOSCOPY: Primary | ICD-10-CM

## 2023-01-18 DIAGNOSIS — Z86.010 PERSONAL HISTORY OF COLONIC POLYPS: ICD-10-CM

## 2023-01-18 PROBLEM — Z86.0100 PERSONAL HISTORY OF COLONIC POLYPS: Status: ACTIVE | Noted: 2023-01-18

## 2023-01-18 RX ORDER — SODIUM PICOSULFATE, MAGNESIUM OXIDE, AND ANHYDROUS CITRIC ACID 10; 3.5; 12 MG/160ML; G/160ML; G/160ML
160 LIQUID ORAL TAKE AS DIRECTED
Qty: 320 ML | Refills: 0 | Status: SHIPPED | OUTPATIENT
Start: 2023-01-18 | End: 2023-01-31 | Stop reason: SDUPTHER

## 2023-01-18 NOTE — TELEPHONE ENCOUNTER
Per Dr Garcia:  Call this pt and scheduled for colonoscopy.      I spoke with Mr Jaleesa, scheduled him for 03/27/23, estimated arrival time of 7:00am. Went over liquid diet. He would like Clenpiq prep, as he wife just got it. Will send [employer] message.    On Eliquis, will hold 2 days prior.  Voiced understanding.  aydin

## 2023-01-18 NOTE — TELEPHONE ENCOUNTER
1/18/2023    Dear Dr Black,     Patient: Jw Lazcano   YOB: 1955        This patient is waiting to have a Colonoscopy which I will perform at [facility] on 03.27.2023 .     Our records indicate this patient is currently taking ELIQUIS. This procedure requires the patient to suspend their anticoagulant medication prior to surgery.     Please respond to this request noting your recommendations. You may contact our office at [phone] Option 1 with any questions. I appreciate your prompt response in this matter.     Please return this form to our office as soon as possible to [phone]    ____ I approve my patient to stop taking their ELIQUIS medication 2 days prior to the scheduled procedure.    ____ I do NOT approve my patient to stop taking their ELIQUIS medication at this time.    ____ I approve my patient from a cardiac standpoint.    ____ I do NOT approve my patient from a cardiac standpoint at this time.    Approving physician name (please print):     _____________________________________________    Approving physician signature:     ________________________________    Date:________________      Sincerely,  [facility]   Gastroenterology - Dr.Kevin Garcia

## 2023-01-25 DIAGNOSIS — I48.0 PAROXYSMAL ATRIAL FIBRILLATION: Chronic | ICD-10-CM

## 2023-01-25 RX ORDER — SPIRONOLACTONE 25 MG/1
TABLET ORAL
Qty: 45 TABLET | Refills: 3 | Status: SHIPPED | OUTPATIENT
Start: 2023-01-25

## 2023-01-30 NOTE — TELEPHONE ENCOUNTER
Procedure: Colonoscopy and/or EGD    Medication Directive:  Eliquis    PMH:  Hx of Mitral Valve Repair, CAD s/p CABG, PAF, HTN, HLD    Last Seen: 12/29/22    ECHO: 12/08/21    • Left ventricular wall thickness is consistent with mild concentric hypertrophy.  • Estimated left ventricular EF was in agreement with the calculated left ventricular EF. Left ventricular ejection fraction appears to be 56 - 60%.  • Left ventricular diastolic function is consistent with (grade I) impaired relaxation.  • Left atrial volume is mildly increased.  • There is mild mitral regurgitation and trace aortic valve regurgitation noted

## 2023-01-31 RX ORDER — SODIUM PICOSULFATE, MAGNESIUM OXIDE, AND ANHYDROUS CITRIC ACID 10; 3.5; 12 MG/160ML; G/160ML; G/160ML
160 LIQUID ORAL TAKE AS DIRECTED
Qty: 320 ML | Refills: 0 | Status: ON HOLD | COMMUNITY
Start: 2023-01-31 | End: 2023-03-27

## 2023-02-01 NOTE — TELEPHONE ENCOUNTER
Per Prisca MAYA: Jw Lazcano is cleared to proceed at intermediate risk for perioperative complications from a cardiac standpoint and may hold Eliquis for 2 days as requested.

## 2023-02-09 ENCOUNTER — LAB (OUTPATIENT)
Dept: LAB | Facility: HOSPITAL | Age: 68
End: 2023-02-09
Payer: COMMERCIAL

## 2023-02-09 ENCOUNTER — OFFICE VISIT (OUTPATIENT)
Dept: FAMILY MEDICINE CLINIC | Facility: CLINIC | Age: 68
End: 2023-02-09
Payer: COMMERCIAL

## 2023-02-09 VITALS
BODY MASS INDEX: 28.06 KG/M2 | OXYGEN SATURATION: 97 % | TEMPERATURE: 97.4 F | HEART RATE: 59 BPM | DIASTOLIC BLOOD PRESSURE: 77 MMHG | HEIGHT: 74 IN | WEIGHT: 218.6 LBS | SYSTOLIC BLOOD PRESSURE: 114 MMHG

## 2023-02-09 DIAGNOSIS — R09.81 NASAL CONGESTION: Primary | ICD-10-CM

## 2023-02-09 DIAGNOSIS — I25.810 CORONARY ARTERY DISEASE INVOLVING CORONARY BYPASS GRAFT OF NATIVE HEART WITHOUT ANGINA PECTORIS: Chronic | ICD-10-CM

## 2023-02-09 DIAGNOSIS — R05.1 ACUTE COUGH: ICD-10-CM

## 2023-02-09 DIAGNOSIS — J32.9 SINUSITIS, UNSPECIFIED CHRONICITY, UNSPECIFIED LOCATION: ICD-10-CM

## 2023-02-09 DIAGNOSIS — I25.810 CORONARY ARTERY DISEASE INVOLVING CORONARY BYPASS GRAFT OF NATIVE HEART WITHOUT ANGINA PECTORIS: ICD-10-CM

## 2023-02-09 DIAGNOSIS — Z12.5 SCREENING FOR MALIGNANT NEOPLASM OF PROSTATE: ICD-10-CM

## 2023-02-09 DIAGNOSIS — E78.00 HIGH CHOLESTEROL: Chronic | ICD-10-CM

## 2023-02-09 LAB
ALBUMIN SERPL-MCNC: 4 G/DL (ref 3.5–5.2)
ALBUMIN/GLOB SERPL: 1.2 G/DL
ALP SERPL-CCNC: 112 U/L (ref 39–117)
ALT SERPL W P-5'-P-CCNC: 19 U/L (ref 1–41)
ANION GAP SERPL CALCULATED.3IONS-SCNC: 8 MMOL/L (ref 5–15)
AST SERPL-CCNC: 27 U/L (ref 1–40)
BASOPHILS # BLD AUTO: 0.06 10*3/MM3 (ref 0–0.2)
BASOPHILS NFR BLD AUTO: 0.5 % (ref 0–1.5)
BILIRUB SERPL-MCNC: 0.5 MG/DL (ref 0–1.2)
BUN SERPL-MCNC: 22 MG/DL (ref 8–23)
BUN/CREAT SERPL: 17.7 (ref 7–25)
CALCIUM SPEC-SCNC: 9.3 MG/DL (ref 8.6–10.5)
CHLORIDE SERPL-SCNC: 104 MMOL/L (ref 98–107)
CO2 SERPL-SCNC: 27 MMOL/L (ref 22–29)
CREAT SERPL-MCNC: 1.24 MG/DL (ref 0.76–1.27)
DEPRECATED RDW RBC AUTO: 41 FL (ref 37–54)
EGFRCR SERPLBLD CKD-EPI 2021: 64.5 ML/MIN/1.73
EOSINOPHIL # BLD AUTO: 0.46 10*3/MM3 (ref 0–0.4)
EOSINOPHIL NFR BLD AUTO: 3.9 % (ref 0.3–6.2)
ERYTHROCYTE [DISTWIDTH] IN BLOOD BY AUTOMATED COUNT: 12.7 % (ref 12.3–15.4)
EXPIRATION DATE: NORMAL
FLUAV AG UPPER RESP QL IA.RAPID: NOT DETECTED
FLUBV AG UPPER RESP QL IA.RAPID: NOT DETECTED
GLOBULIN UR ELPH-MCNC: 3.3 GM/DL
GLUCOSE SERPL-MCNC: 85 MG/DL (ref 65–99)
HCT VFR BLD AUTO: 44.4 % (ref 37.5–51)
HGB BLD-MCNC: 15.4 G/DL (ref 13–17.7)
IMM GRANULOCYTES # BLD AUTO: 0.06 10*3/MM3 (ref 0–0.05)
IMM GRANULOCYTES NFR BLD AUTO: 0.5 % (ref 0–0.5)
INTERNAL CONTROL: NORMAL
LYMPHOCYTES # BLD AUTO: 2.44 10*3/MM3 (ref 0.7–3.1)
LYMPHOCYTES NFR BLD AUTO: 20.7 % (ref 19.6–45.3)
Lab: NORMAL
MCH RBC QN AUTO: 31.1 PG (ref 26.6–33)
MCHC RBC AUTO-ENTMCNC: 34.7 G/DL (ref 31.5–35.7)
MCV RBC AUTO: 89.7 FL (ref 79–97)
MONOCYTES # BLD AUTO: 0.93 10*3/MM3 (ref 0.1–0.9)
MONOCYTES NFR BLD AUTO: 7.9 % (ref 5–12)
NEUTROPHILS NFR BLD AUTO: 66.5 % (ref 42.7–76)
NEUTROPHILS NFR BLD AUTO: 7.85 10*3/MM3 (ref 1.7–7)
NRBC BLD AUTO-RTO: 0.1 /100 WBC (ref 0–0.2)
PLATELET # BLD AUTO: 261 10*3/MM3 (ref 140–450)
PMV BLD AUTO: 10.6 FL (ref 6–12)
POTASSIUM SERPL-SCNC: 4.3 MMOL/L (ref 3.5–5.2)
PROT SERPL-MCNC: 7.3 G/DL (ref 6–8.5)
PSA SERPL-MCNC: 1.02 NG/ML (ref 0–4)
RBC # BLD AUTO: 4.95 10*6/MM3 (ref 4.14–5.8)
SARS-COV-2 AG UPPER RESP QL IA.RAPID: NOT DETECTED
SODIUM SERPL-SCNC: 139 MMOL/L (ref 136–145)
WBC NRBC COR # BLD: 11.8 10*3/MM3 (ref 3.4–10.8)

## 2023-02-09 PROCEDURE — 99204 OFFICE O/P NEW MOD 45 MIN: CPT

## 2023-02-09 PROCEDURE — 80053 COMPREHEN METABOLIC PANEL: CPT

## 2023-02-09 PROCEDURE — 36415 COLL VENOUS BLD VENIPUNCTURE: CPT

## 2023-02-09 PROCEDURE — G0103 PSA SCREENING: HCPCS

## 2023-02-09 PROCEDURE — 85025 COMPLETE CBC W/AUTO DIFF WBC: CPT

## 2023-02-09 PROCEDURE — 87428 SARSCOV & INF VIR A&B AG IA: CPT

## 2023-02-09 RX ORDER — BENZONATATE 100 MG/1
100 CAPSULE ORAL 3 TIMES DAILY PRN
Qty: 21 CAPSULE | Refills: 0 | Status: SHIPPED | OUTPATIENT
Start: 2023-02-09 | End: 2023-02-16

## 2023-02-09 RX ORDER — CEFDINIR 300 MG/1
300 CAPSULE ORAL 2 TIMES DAILY
Qty: 14 CAPSULE | Refills: 0 | Status: SHIPPED | OUTPATIENT
Start: 2023-02-09 | End: 2023-02-16

## 2023-02-09 NOTE — PROGRESS NOTES
Chief Complaint   Patient presents with   • Establish Care     Was going to Dr. Jessica and hasn't had a primary care provider since he retired.    • Nasal Congestion     Head congestion and cough for a week now has been taking Nyquil. He took a home Covid test on Friday and was negative.    • Cough   • Headache       Subjective          Jw Lazcano presents to [facility]    History of Present Illness  He is here to establish care. He was going to Dr. Jessica but they retired. He has nasal congestion, cough, and headache. He was tested for covid and flu and was negative for both. He has been sick for greater than a week. Due to his previous heart conditions he would like to avoid steroids.       Past History:  Medical History: has a past medical history of CAD (coronary artery disease), Chest pain due to CAD ([facility]) (12/2018), CKD (chronic kidney disease), stage III ([facility]), Coronary artery disease involving coronary bypass graft of native heart without angina pectoris (12/26/2018), Coronary artery disease involving native coronary artery of native heart without angina pectoris (12/26/2018), MCINTOSH (dyspnea on exertion), Heart murmur, High blood pressure (12/7/2021), High cholesterol (12/7/2021), History of mitral valve repair (12/7/2021), Hyperlipidemia, Hypertension, Left atrial enlargement (12/24/2018), Mild aortic valve regurgitation (12/24/2018), Mitral valve insufficiency, Mitral valve prolapse (12/24/2018), Mitral valve regurgitation (12/21/18-[facility]), Paroxysmal atrial fibrillation ([facility]) (2/14/2019), and SOB (shortness of breath) (12/2018).   Surgical History: has a past surgical history that includes Cholecystectomy; Cardiac catheterization (Bilateral, 12/21/2018-[facility]); REINALDO (12/24/18-[facility]); Coronary artery bypass graft (N/A, 12/27/2018); Mitral valve repair; and Colonoscopy (2015,,2023).   Family History: family history includes Valvular heart disease in his father.   Social History:  "reports that he has never smoked. He has never been exposed to tobacco smoke. He has never used smokeless tobacco. He reports that he does not currently use alcohol. He reports that he does not use drugs.  Allergies: Penicillins  (Not in a hospital admission)       Social History     Socioeconomic History   • Marital status:    Tobacco Use   • Smoking status: Never     Passive exposure: Never   • Smokeless tobacco: Never   Substance and Sexual Activity   • Alcohol use: Not Currently   • Drug use: No   • Sexual activity: Not Currently     Partners: Female     Birth control/protection: Vasectomy       Health Maintenance Due   Topic Date Due   • HEPATITIS C SCREENING  Never done       Objective     Vital Signs:   /77 (BP Location: Left arm, Patient Position: Sitting)   Pulse 59   Temp 97.4 °F (36.3 °C) (Infrared)   Ht 188 cm (74\")   Wt 99.2 kg (218 lb 9.6 oz)   SpO2 97%   BMI 28.07 kg/m²       Physical Exam  Constitutional:       Appearance: Normal appearance.   HENT:      Nose: Nose normal.      Mouth/Throat:      Mouth: Mucous membranes are moist.   Cardiovascular:      Rate and Rhythm: Normal rate and regular rhythm.   Pulmonary:      Effort: Pulmonary effort is normal.      Breath sounds: Normal breath sounds.   Skin:     General: Skin is warm and dry.   Neurological:      General: No focal deficit present.      Mental Status: He is alert and oriented to person, place, and time.   Psychiatric:         Mood and Affect: Mood normal.         Behavior: Behavior normal.          Review of Systems     Result Review :                 Assessment and Plan    Diagnoses and all orders for this visit:    1. Nasal congestion (Primary)  -     POCT SARS-CoV-2 Antigen YARY + Flu    2. Acute cough  -     POCT SARS-CoV-2 Antigen YARY + Flu    3. Sinusitis, unspecified chronicity, unspecified location  -     benzonatate (Tessalon Perles) 100 MG capsule; Take 1 capsule by mouth 3 (Three) Times a Day As Needed for " Cough for up to 7 days.  Dispense: 21 capsule; Refill: 0  -     cefdinir (OMNICEF) 300 MG capsule; Take 1 capsule by mouth 2 (Two) Times a Day for 7 days.  Dispense: 14 capsule; Refill: 0    4. Screening for malignant neoplasm of prostate  -     PSA SCREENING; Future    5. Coronary artery disease involving coronary bypass graft of native heart without angina pectoris  Comments:  Has regular follow ups with cardiology.     Orders:  -     CBC w AUTO Differential; Future  -     Comprehensive metabolic panel; Future    6. High cholesterol  Comments:  cholesterol WNL 5/2022. Will recheck again in May          Pt thought to be clinically stable at this time.    Follow Up   Return if symptoms worsen or fail to improve.  Patient was given instructions and counseling regarding his condition or for health maintenance advice. Please see specific information pulled into the AVS if appropriate.

## 2023-02-10 NOTE — PROGRESS NOTES
WBCs are elevated. Make sure to finish full course of antibiotics as this does indicate probable sinus infection

## 2023-03-13 ENCOUNTER — TELEPHONE (OUTPATIENT)
Dept: GASTROENTEROLOGY | Facility: CLINIC | Age: 68
End: 2023-03-13
Payer: COMMERCIAL

## 2023-03-13 NOTE — TELEPHONE ENCOUNTER
I spoke with Mr Lazcano confirmed his scheduled colonoscopy on 03.27.23, estimated arrival time of 7:00am. Reminded of liquid diet the day prior. Reminded of bowel prep and instructions. Reminded to hold Eliquis 2 days prior.  Stated a hospital nurse will call to go over Rx's and confirm time.  Voiced understanding. [email or web address]

## 2023-03-22 NOTE — PRE-PROCEDURE INSTRUCTIONS
PT INSTRUCTED ON CLEAR LIQ DIET AND PO SPLIT PREP LAST BM SHOULD BE CLEAR no meds to take in am ,takes his meds at night  ARRIVAL TIME IS 0630 am ON 3/27/23 last dose of eliquis Friday 3/24/23 pm dose

## 2023-03-27 ENCOUNTER — ANESTHESIA EVENT (OUTPATIENT)
Dept: GASTROENTEROLOGY | Facility: HOSPITAL | Age: 68
End: 2023-03-27
Payer: COMMERCIAL

## 2023-03-27 ENCOUNTER — HOSPITAL ENCOUNTER (OUTPATIENT)
Facility: HOSPITAL | Age: 68
Setting detail: HOSPITAL OUTPATIENT SURGERY
Discharge: HOME | End: 2023-03-27
Attending: INTERNAL MEDICINE | Admitting: INTERNAL MEDICINE
Payer: COMMERCIAL

## 2023-03-27 ENCOUNTER — ANESTHESIA (OUTPATIENT)
Dept: GASTROENTEROLOGY | Facility: HOSPITAL | Age: 68
End: 2023-03-27
Payer: COMMERCIAL

## 2023-03-27 VITALS
OXYGEN SATURATION: 98 % | TEMPERATURE: 97.6 F | SYSTOLIC BLOOD PRESSURE: 117 MMHG | HEIGHT: 74 IN | DIASTOLIC BLOOD PRESSURE: 84 MMHG | HEART RATE: 51 BPM | RESPIRATION RATE: 13 BRPM | BODY MASS INDEX: 27.95 KG/M2 | WEIGHT: 217.81 LBS

## 2023-03-27 DIAGNOSIS — Z12.11 ENCOUNTER FOR SCREENING COLONOSCOPY: ICD-10-CM

## 2023-03-27 DIAGNOSIS — Z86.010 PERSONAL HISTORY OF COLONIC POLYPS: ICD-10-CM

## 2023-03-27 PROCEDURE — 88305 TISSUE EXAM BY PATHOLOGIST: CPT | Performed by: INTERNAL MEDICINE

## 2023-03-27 PROCEDURE — 25010000002 PROPOFOL 10 MG/ML EMULSION: Performed by: ANESTHESIOLOGY

## 2023-03-27 RX ORDER — SODIUM CHLORIDE, SODIUM LACTATE, POTASSIUM CHLORIDE, CALCIUM CHLORIDE 600; 310; 30; 20 MG/100ML; MG/100ML; MG/100ML; MG/100ML
30 INJECTION, SOLUTION INTRAVENOUS CONTINUOUS
Status: DISCONTINUED | OUTPATIENT
Start: 2023-03-27 | End: 2023-03-27 | Stop reason: HOSPADM

## 2023-03-27 RX ORDER — LIDOCAINE HYDROCHLORIDE 20 MG/ML
INJECTION, SOLUTION EPIDURAL; INFILTRATION; INTRACAUDAL; PERINEURAL AS NEEDED
Status: DISCONTINUED | OUTPATIENT
Start: 2023-03-27 | End: 2023-03-27 | Stop reason: SURG

## 2023-03-27 RX ORDER — EPHEDRINE SULFATE 50 MG/ML
INJECTION, SOLUTION INTRAVENOUS AS NEEDED
Status: DISCONTINUED | OUTPATIENT
Start: 2023-03-27 | End: 2023-03-27 | Stop reason: SURG

## 2023-03-27 RX ORDER — GLYCOPYRROLATE 0.2 MG/ML
INJECTION INTRAMUSCULAR; INTRAVENOUS AS NEEDED
Status: DISCONTINUED | OUTPATIENT
Start: 2023-03-27 | End: 2023-03-27 | Stop reason: SURG

## 2023-03-27 RX ADMIN — PROPOFOL 130 MCG/KG/MIN: 10 INJECTION, EMULSION INTRAVENOUS at 07:37

## 2023-03-27 RX ADMIN — SODIUM CHLORIDE, POTASSIUM CHLORIDE, SODIUM LACTATE AND CALCIUM CHLORIDE 30 ML/HR: 600; 310; 30; 20 INJECTION, SOLUTION INTRAVENOUS at 07:23

## 2023-03-27 RX ADMIN — GLYCOPYRROLATE 0.2 MG: 0.2 INJECTION INTRAMUSCULAR; INTRAVENOUS at 07:48

## 2023-03-27 RX ADMIN — LIDOCAINE HYDROCHLORIDE 100 MG: 20 INJECTION, SOLUTION EPIDURAL; INFILTRATION; INTRACAUDAL; PERINEURAL at 07:37

## 2023-03-27 RX ADMIN — EPHEDRINE SULFATE 10 MG: 50 INJECTION INTRAVENOUS at 07:53

## 2023-03-27 NOTE — ANESTHESIA PREPROCEDURE EVALUATION
Anesthesia Evaluation     Patient summary reviewed and Nursing notes reviewed   no history of anesthetic complications:  NPO Solid Status: > 8 hours  NPO Liquid Status: > 2 hours           Airway   Mallampati: II  TM distance: >3 FB  Neck ROM: full  No difficulty expected  Dental      Pulmonary - negative pulmonary ROS and normal exam    breath sounds clear to auscultation  Cardiovascular - normal exam  Exercise tolerance: good (4-7 METS)    Rhythm: regular  Rate: normal    (+) hypertension, valvular problems/murmurs, CABG >6 Months, dysrhythmias Atrial Fib,       Neuro/Psych- negative ROS  GI/Hepatic/Renal/Endo    (+)   renal disease CRI,     Musculoskeletal (-) negative ROS    Abdominal    Substance History - negative use     OB/GYN negative ob/gyn ROS         Other - negative ROS       ROS/Med Hx Other: PAT Nursing Notes unavailable.                   Anesthesia Plan    ASA 3     general       Anesthetic plan, risks, benefits, and alternatives have been provided, discussed and informed consent has been obtained with: patient and spouse/significant other.        CODE STATUS:

## 2023-03-27 NOTE — H&P
ScreeningPre Procedure History & Physical    Chief Complaint:   Screening     Subjective     HPI:   Screening     Past Medical History:   Past Medical History:   Diagnosis Date   • CAD (coronary artery disease)     S/p CABG 12/2018   • Chest pain due to CAD ([facility]) 12/2018   • CKD (chronic kidney disease), stage III (HCC)    • Coronary artery disease involving coronary bypass graft of native heart without angina pectoris 12/26/2018     with bypass x1 and mitral valve repair 12/27/2018   • Coronary artery disease involving native coronary artery of native heart without angina pectoris 12/26/2018     with bypass x1 and mitral valve repair 12/27/2018   • MCINTOSH (dyspnea on exertion)    • Heart murmur    • High blood pressure 12/07/2021   • High cholesterol 12/07/2021   • History of mitral valve repair 12/07/2021   • Hyperlipidemia     Mild   • Hypertension    • Left atrial enlargement 12/24/2018    Noted on REINALDO   • Mild aortic valve regurgitation 12/24/2018    Noted on REINALDO   • Mitral valve insufficiency     S/p MVR 12/2018   • Mitral valve prolapse 12/24/2018    Moderate to Severe Noted on REINALDO; S/p MVR 12/2018   • Mitral valve regurgitation 12/21/18-[facility]    Severe Noted on Cardiac Cath & REINALDO-12/24/18   • Paroxysmal atrial fibrillation (HCC) 02/14/2019   • SOB (shortness of breath) 12/2018       Past Surgical History:  Past Surgical History:   Procedure Laterality Date   • CARDIAC CATHETERIZATION Bilateral 12/21/2018-[facility]    w/L Ventriculography/Coronary Angiography--Single-Vessel CAD Noted w/Severe MVR   • CARDIAC SURGERY     • CHOLECYSTECTOMY     • COLONOSCOPY  2015,,2023   • CORONARY ARTERY BYPASS GRAFT N/A 12/27/2018    Procedure: REINALDO STERNOTOMY CORONARY ARTERY BYPASS GRAFT TIMES 1 USING LEFT INTERNAL MAMMARY ARTERY AND LEFT GREATER SAPHENOUS VEIN GRAFT PER ENDOSCOPIC VEIN HARVESTING, MITRAL VALVE REPAIR AND PRP;  Surgeon: Miguel Lobo MD;  Location: [facility];  Service: Cardiothoracic   • MITRAL VALVE  "REPAIR/REPLACEMENT     • NOSE SURGERY N/A     deviated septum   • REINALDO  12/24/18-[facility]    Moderate-Severe MV Prolapse; Mild Calcific Change; L Atrial Enlargement; EF 65%; Mild AVR Noted; Severe MVR Noted   • TONSILLECTOMY         Family History:  Family History   Problem Relation Age of Onset   • Valvular heart disease Father         In the 1970's   • Malig Hyperthermia Neg Hx        Social History:   reports that he has never smoked. He has never been exposed to tobacco smoke. He has never used smokeless tobacco. He reports that he does not currently use alcohol. He reports that he does not use drugs.    Medications:   Medications Prior to Admission   Medication Sig Dispense Refill Last Dose   • aspirin 81 MG EC tablet Take 1 tablet by mouth Every Other Day.   Past Week   • atorvastatin (LIPITOR) 40 MG tablet Take 1 tablet by mouth Daily. (Patient taking differently: Take 1 tablet by mouth Every Night.) 90 tablet 3 3/26/2023   • losartan (COZAAR) 25 MG tablet Take 1 tablet by mouth Daily. (Patient taking differently: Take 1 tablet by mouth Every Other Day. morning) 90 tablet 3 3/26/2023   • Multiple Vitamin (MULTIVITAMIN) tablet tablet Take 1 tablet by mouth Daily.   Past Week   • spironolactone (ALDACTONE) 25 MG tablet 1 Tablet by mouth every other day or as directed (Patient taking differently: Take 1 tablet by mouth Daily. 1 Tablet by mouth every other day or as directed) 45 tablet 3 3/26/2023   • apixaban (ELIQUIS) 5 MG tablet tablet Take 1 tablet by mouth 2 (Two) Times a Day. (Patient taking differently: Take 1 tablet by mouth 2 (Two) Times a Day. Last dose Friday 3/24/23) 180 tablet 3 3/24/2023       Allergies:  Penicillins        Objective     Blood pressure 133/99, pulse (!) 45, temperature 97.9 °F (36.6 °C), temperature source Temporal, resp. rate 16, height 188 cm (74.02\"), weight 98.8 kg (217 lb 13 oz), SpO2 96 %.    Physical Exam   Constitutional: Pt is oriented to person, place, and time and " well-developed, well-nourished, and in no distress.   Mouth/Throat: Oropharynx is clear and moist.   Neck: Normal range of motion.   Cardiovascular: Normal rate, regular rhythm and normal heart sounds.    Pulmonary/Chest: Effort normal and breath sounds normal.   Abdominal: Soft. Nontender  Skin: Skin is warm and dry.   Psychiatric: Mood, memory, affect and judgment normal.     Assessment & Plan     Diagnosis:  Screening colonoscopy  H/o colon polyps     Anticipated Surgical Procedure:  colonoscopy    The risks, benefits, and alternatives of this procedure have been discussed with the patient or the responsible party- the patient understands and agrees to proceed.

## 2023-03-27 NOTE — ANESTHESIA POSTPROCEDURE EVALUATION
Patient: Jw Lazcano    Procedure Summary     Date: 03/27/23 Room / Location: [facility] ENDOSCOPY 2 / [facility] ENDOSCOPY    Anesthesia Start: 0735 Anesthesia Stop: 0811    Procedure: COLONOSCOPY with hot snare polypectomy Diagnosis:       Encounter for screening colonoscopy      Personal history of colonic polyps      (Encounter for screening colonoscopy [Z12.11])      (Personal history of colonic polyps [Z86.010])    Surgeons: Cyril Garcia MD Provider: Madeleine Lopez CRNA    Anesthesia Type: general ASA Status: 3          Anesthesia Type: general    Vitals  Vitals Value Taken Time   /84 03/27/23 0826   Temp 36.4 °C (97.6 °F) 03/27/23 0825   Pulse 50 03/27/23 0828   Resp 13 03/27/23 0825   SpO2 97 % 03/27/23 0828   Vitals shown include unvalidated device data.        Post Anesthesia Care and Evaluation    Patient location during evaluation: bedside  Patient participation: complete - patient participated  Level of consciousness: awake  Pain management: adequate    Airway patency: patent  PONV Status: none  Cardiovascular status: acceptable and stable  Respiratory status: acceptable  Hydration status: acceptable    Comments: An Anesthesiologist personally participated in the most demanding procedures (including induction and emergence if applicable) in the anesthesia plan, monitored the course of anesthesia administration at frequent intervals and remained physically present and available for immediate diagnosis and treatment of emergencies.

## 2023-03-28 ENCOUNTER — TELEPHONE (OUTPATIENT)
Dept: GASTROENTEROLOGY | Facility: CLINIC | Age: 68
End: 2023-03-28
Payer: COMMERCIAL

## 2023-03-28 LAB
CYTO UR: NORMAL
LAB AP CASE REPORT: NORMAL
LAB AP CLINICAL INFORMATION: NORMAL
PATH REPORT.FINAL DX SPEC: NORMAL
PATH REPORT.GROSS SPEC: NORMAL

## 2023-03-28 NOTE — TELEPHONE ENCOUNTER
Patient placed in 5 year colon recall.  updatd. Letter sent to patient ----- Message from ZULMA Fraire sent at 3/28/2023 10:23 AM EDT -----  I have reviewed the patient colonoscopy and pathology report. Patient has a sessile serrated polyp on pathology report that was completely removed. It is recommended the patient be on a 5 year recall. Please place in the recall system.

## 2023-08-31 ENCOUNTER — OFFICE VISIT (OUTPATIENT)
Dept: FAMILY MEDICINE CLINIC | Facility: CLINIC | Age: 68
End: 2023-08-31
Payer: COMMERCIAL

## 2023-08-31 VITALS
OXYGEN SATURATION: 97 % | RESPIRATION RATE: 20 BRPM | SYSTOLIC BLOOD PRESSURE: 106 MMHG | BODY MASS INDEX: 27.98 KG/M2 | TEMPERATURE: 98.4 F | WEIGHT: 218 LBS | HEART RATE: 87 BPM | DIASTOLIC BLOOD PRESSURE: 81 MMHG | HEIGHT: 74 IN

## 2023-08-31 DIAGNOSIS — E78.00 HIGH CHOLESTEROL: Chronic | ICD-10-CM

## 2023-08-31 DIAGNOSIS — J32.9 SINUSITIS, UNSPECIFIED CHRONICITY, UNSPECIFIED LOCATION: Primary | ICD-10-CM

## 2023-08-31 DIAGNOSIS — I25.810 CORONARY ARTERY DISEASE INVOLVING CORONARY BYPASS GRAFT OF NATIVE HEART WITHOUT ANGINA PECTORIS: Chronic | ICD-10-CM

## 2023-08-31 PROCEDURE — 99214 OFFICE O/P EST MOD 30 MIN: CPT

## 2023-08-31 RX ORDER — BENZONATATE 100 MG/1
100 CAPSULE ORAL 3 TIMES DAILY PRN
Qty: 21 CAPSULE | Refills: 0 | Status: SHIPPED | OUTPATIENT
Start: 2023-08-31 | End: 2023-09-07

## 2023-08-31 RX ORDER — CEFDINIR 300 MG/1
300 CAPSULE ORAL 2 TIMES DAILY
Qty: 14 CAPSULE | Refills: 0 | Status: SHIPPED | OUTPATIENT
Start: 2023-08-31 | End: 2023-09-07

## 2023-08-31 NOTE — PROGRESS NOTES
Chief Complaint   Patient presents with    Cough     Has had a cough for a week and a half now. [place] water up is nose. He ran a low grade fever last weekend. Cannot get rid of the cough and at night he is coughing up drainage. Thinks he may have a sinus infection.        Subjective          Jw Lazcano presents to [facility]    History of Present Illness  Jw is here to be seen for cough for a week and a half now. This [place]. He has ran a low grade fever last weekend and has been coughing up so drainage. His throat has post nasal drip but is not red. His ears are clear JUAN ANTONIO. He does have some sinus congestion. The biggest problem he is complaining of is cough that is worse at night. He has tried some cough syrup and it isn't working well for him and he's saying it's making things taste bad now.     Cough  This is a recurrent problem. The current episode started 1 to 4 weeks ago. The problem has been waxing and waning. The problem occurs constantly. The cough is Productive of sputum. Associated symptoms include a fever and nasal congestion. Pertinent negatives include no chest pain, chills, ear congestion, ear pain, headaches, heartburn, hemoptysis, myalgias, postnasal drip, rash, rhinorrhea, sore throat, shortness of breath, sweats, weight loss or wheezing. The symptoms are aggravated by lying down and pollens.     Past History:  Medical History: has a past medical history of CAD (coronary artery disease), Chest pain due to CAD (12/2018), CKD (chronic kidney disease), stage III, Coronary artery disease involving coronary bypass graft of native heart without angina pectoris (12/26/2018), Coronary artery disease involving native coronary artery of native heart without angina pectoris (12/26/2018), MCINTOSH (dyspnea on exertion), Heart murmur, High blood pressure (12/07/2021), High cholesterol (12/07/2021),  "History of mitral valve repair (12/07/2021), Hyperlipidemia, Hypertension, Left atrial enlargement (12/24/2018), Mild aortic valve regurgitation (12/24/2018), Mitral valve insufficiency, Mitral valve prolapse (12/24/2018), Mitral valve regurgitation (12/21/18-[facility]), Paroxysmal atrial fibrillation (02/14/2019), and SOB (shortness of breath) (12/2018).   Surgical History: has a past surgical history that includes Cholecystectomy; Cardiac catheterization (Bilateral, 12/21/2018-[facility]); REINALDO (12/24/18-[facility]); Coronary artery bypass graft (N/A, 12/27/2018); Mitral valve repair; Colonoscopy (2015,,2023); Cardiac surgery; Nose surgery (N/A); Tonsillectomy; and Colonoscopy (N/A, 3/27/2023).   Family History: family history includes Valvular heart disease in his father.   Social History: reports that he has never smoked. He has never been exposed to tobacco smoke. He has never used smokeless tobacco. He reports that he does not currently use alcohol. He reports that he does not use drugs.  Allergies: Penicillins  (Not in a hospital admission)       Social History     Socioeconomic History    Marital status:    Tobacco Use    Smoking status: Never     Passive exposure: Never    Smokeless tobacco: Never   Substance and Sexual Activity    Alcohol use: Not Currently    Drug use: No    Sexual activity: Not Currently     Partners: Female     Birth control/protection: Vasectomy       Health Maintenance Due   Topic Date Due    HEPATITIS C SCREENING  Never done    LIPID PANEL  05/04/2023       Objective     Vital Signs:   /81 (BP Location: Left arm, Patient Position: Sitting)   Pulse 87   Temp 98.4 øF (36.9 øC) (Infrared)   Resp 20   Ht 188 cm (74.02\")   Wt 98.9 kg (218 lb)   SpO2 97%   BMI 27.97 kg/mý       Physical Exam  Constitutional:       Appearance: Normal appearance.   HENT:      Nose: Nose normal.      Mouth/Throat:      Mouth: Mucous membranes are moist.   Cardiovascular:      Rate and Rhythm: Normal rate and " regular rhythm.      Pulses: Normal pulses.      Heart sounds: Normal heart sounds.   Pulmonary:      Effort: Pulmonary effort is normal.      Breath sounds: Normal breath sounds.   Skin:     General: Skin is warm and dry.   Neurological:      General: No focal deficit present.      Mental Status: He is alert and oriented to person, place, and time.   Psychiatric:         Mood and Affect: Mood normal.         Behavior: Behavior normal.        Review of Systems   Constitutional:  Positive for fever. Negative for chills and unexpected weight loss.   HENT:  Negative for ear pain, postnasal drip, rhinorrhea and sore throat.    Respiratory:  Positive for cough. Negative for hemoptysis, shortness of breath and wheezing.    Cardiovascular:  Negative for chest pain.   Musculoskeletal:  Negative for myalgias.   Skin:  Negative for rash.      Result Review :                 Assessment and Plan    Diagnoses and all orders for this visit:    1. Sinusitis, unspecified chronicity, unspecified location (Primary)  -     benzonatate (Tessalon Perles) 100 MG capsule; Take 1 capsule by mouth 3 (Three) Times a Day As Needed for Cough for up to 7 days.  Dispense: 21 capsule; Refill: 0  -     cefdinir (OMNICEF) 300 MG capsule; Take 1 capsule by mouth 2 (Two) Times a Day for 7 days.  Dispense: 14 capsule; Refill: 0    2. High cholesterol  Comments:  Continue on atorvastatin 40 mg nightly. Recheck levels in February.    3. Coronary artery disease involving coronary bypass graft of native heart without angina pectoris  Comments:  Continue on eliquis daily.          Pt thought to be clinically stable at this time.    Follow Up   Return in about 6 months (around 2/29/2024), or if symptoms worsen or fail to improve.  Patient was given instructions and counseling regarding his condition or for health maintenance advice. Please see specific information pulled into the AVS if appropriate.

## 2023-09-11 DIAGNOSIS — J32.9 SINUSITIS, UNSPECIFIED CHRONICITY, UNSPECIFIED LOCATION: ICD-10-CM

## 2023-09-11 NOTE — TELEPHONE ENCOUNTER
Sent Yvonne a message to see if she wants to refill. She saw him on 08/31/23 for cough and congestion.

## 2023-09-13 RX ORDER — CEFDINIR 300 MG/1
CAPSULE ORAL
Qty: 14 CAPSULE | Refills: 0 | Status: SHIPPED | OUTPATIENT
Start: 2023-09-13

## 2023-10-05 ENCOUNTER — TELEPHONE (OUTPATIENT)
Dept: CARDIOLOGY | Facility: CLINIC | Age: 68
End: 2023-10-05
Payer: COMMERCIAL

## 2023-10-05 DIAGNOSIS — E78.00 HIGH CHOLESTEROL: Chronic | ICD-10-CM

## 2023-10-05 RX ORDER — ATORVASTATIN CALCIUM 40 MG/1
40 TABLET, FILM COATED ORAL DAILY
Qty: 90 TABLET | Refills: 0 | Status: SHIPPED | OUTPATIENT
Start: 2023-10-05

## 2023-10-05 NOTE — TELEPHONE ENCOUNTER
[facility] received a fax that requires your attention. The document has been indexed to the patient’s chart for your review.      Reason for sending: EXTERNAL MEDICAL RECORD NOTIFICATION     Documents Description: MEDS-ATORVASTATIN REFILL-10.5.23    Name of Sender: [employer]     Date Indexed: 10.5.23

## 2023-10-20 ENCOUNTER — HOSPITAL ENCOUNTER (OUTPATIENT)
Dept: GENERAL RADIOLOGY | Facility: HOSPITAL | Age: 68
Discharge: HOME | End: 2023-10-20
Payer: COMMERCIAL
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
## 2023-10-20 DIAGNOSIS — M25.552 LEFT HIP PAIN: ICD-10-CM
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
## 2023-10-20 PROCEDURE — 73502 X-RAY EXAM HIP UNI 2-3 VIEWS: CPT

## 2023-10-20 PROCEDURE — 73552 X-RAY EXAM OF FEMUR 2/>: CPT

## 2023-10-29 ENCOUNTER — APPOINTMENT (OUTPATIENT)
Dept: GENERAL RADIOLOGY | Facility: HOSPITAL | Age: 68
End: 2023-10-29
Payer: COMMERCIAL

## 2023-10-29 ENCOUNTER — HOSPITAL ENCOUNTER (EMERGENCY)
Facility: HOSPITAL | Age: 68
Discharge: HOME | End: 2023-10-29
Attending: EMERGENCY MEDICINE | Admitting: EMERGENCY MEDICINE
Payer: COMMERCIAL

## 2023-10-29 VITALS
DIASTOLIC BLOOD PRESSURE: 80 MMHG | HEART RATE: 52 BPM | RESPIRATION RATE: 18 BRPM | OXYGEN SATURATION: 99 % | SYSTOLIC BLOOD PRESSURE: 115 MMHG | TEMPERATURE: 98.2 F

## 2023-10-29 DIAGNOSIS — S89.92XA INJURY OF LEFT LOWER EXTREMITY, INITIAL ENCOUNTER: Primary | ICD-10-CM

## 2023-10-29 PROCEDURE — 99282 EMERGENCY DEPT VISIT SF MDM: CPT

## 2023-10-29 PROCEDURE — 73502 X-RAY EXAM HIP UNI 2-3 VIEWS: CPT

## 2023-10-29 NOTE — ED PROVIDER NOTES
Time: 4:05 PM EDT  Date of encounter:  10/29/2023  Independent Historian/Clinical History and Information was obtained by:   Patient    History is limited by: N/A    Chief Complaint: Left leg pain      History of Present Illness:  Patient is a 68 y.o. year old male who presents to the emergency department for evaluation of left hip pain after fall 9 days ago.  Patient states he had x-rays completed at that time that were negative.  Patient states bruising and swelling have gone down his leg into his knee and left ankle.  Denies tenderness at the knee or ankle.  Small abrasion to left upper thigh with bruising of left knee leg and ankle.  States he has been walking on the leg but it is difficult due to pain in the left thigh. .    HPI    Patient Care Team  Primary Care Provider: Yvonne Riojas APRN    Past Medical History:     Allergies   Allergen Reactions    Penicillins Rash     Past Medical History:   Diagnosis Date    CAD (coronary artery disease)     S/p CABG 12/2018    Chest pain due to CAD 12/2018    CKD (chronic kidney disease), stage III     Coronary artery disease involving coronary bypass graft of native heart without angina pectoris 12/26/2018     with bypass x1 and mitral valve repair 12/27/2018    Coronary artery disease involving native coronary artery of native heart without angina pectoris 12/26/2018     with bypass x1 and mitral valve repair 12/27/2018    MCINTOSH (dyspnea on exertion)     Heart murmur     High blood pressure 12/07/2021    High cholesterol 12/07/2021    History of mitral valve repair 12/07/2021    Hyperlipidemia     Mild    Hypertension     Left atrial enlargement 12/24/2018    Noted on REINALDO    Mild aortic valve regurgitation 12/24/2018    Noted on REINALDO    Mitral valve insufficiency     S/p MVR 12/2018    Mitral valve prolapse 12/24/2018    Moderate to Severe Noted on REINALDO; S/p MVR 12/2018    Mitral valve regurgitation 12/21/18-[facility]    Severe Noted on Cardiac Cath & REINALDO-12/24/18     Paroxysmal atrial fibrillation 02/14/2019    SOB (shortness of breath) 12/2018     Past Surgical History:   Procedure Laterality Date    CARDIAC CATHETERIZATION Bilateral 12/21/2018-[facility]    w/L Ventriculography/Coronary Angiography--Single-Vessel CAD Noted w/Severe MVR    CARDIAC SURGERY      CHOLECYSTECTOMY      COLONOSCOPY  2015,,2023    COLONOSCOPY N/A 3/27/2023    Procedure: COLONOSCOPY with hot snare polypectomy;  Surgeon: Cyril Garcia MD;  Location: [facility] ENDOSCOPY;  Service: Gastroenterology;  Laterality: N/A;  colon polyps, diverticulosis    CORONARY ARTERY BYPASS GRAFT N/A 12/27/2018    Procedure: REINALDO STERNOTOMY CORONARY ARTERY BYPASS GRAFT TIMES 1 USING LEFT INTERNAL MAMMARY ARTERY AND LEFT GREATER SAPHENOUS VEIN GRAFT PER ENDOSCOPIC VEIN HARVESTING, MITRAL VALVE REPAIR AND PRP;  Surgeon: Miguel Lobo MD;  Location: [facility] MAIN OR;  Service: Cardiothoracic    MITRAL VALVE REPAIR/REPLACEMENT      NOSE SURGERY N/A     deviated septum    REINALDO  12/24/18-[facility]    Moderate-Severe MV Prolapse; Mild Calcific Change; L Atrial Enlargement; EF 65%; Mild AVR Noted; Severe MVR Noted    TONSILLECTOMY       Family History   Problem Relation Age of Onset    Valvular heart disease Father         In the 1970's    Malig Hyperthermia Neg Hx        Home Medications:  Prior to Admission medications    Medication Sig Start Date End Date Taking? Authorizing Provider   apixaban (ELIQUIS) 5 MG tablet tablet Take 1 tablet by mouth 2 (Two) Times a Day.  Patient taking differently: Take 1 tablet by mouth 2 (Two) Times a Day. Last dose Friday 3/24/23 1/25/23   Wayne Black MD   aspirin 81 MG EC tablet Take 1 tablet by mouth Every Other Day.    Provider, MD Lashanda   atorvastatin (LIPITOR) 40 MG tablet Take 1 tablet by mouth Daily. 10/5/23   Wayne Black MD   cefdinir (OMNICEF) 300 MG capsule TAKE 1 CAPSULE BY MOUTH TWICE DAILY FOR 7 DAYS FOR INFECTION (TAKE WITH FOOD) 9/13/23   Yvonne Riojas,  APRN   losartan (COZAAR) 25 MG tablet Take 1 tablet by mouth Daily.  Patient taking differently: Take 1 tablet by mouth Every Other Day. morning 12/19/22   Wayne Black MD   Multiple Vitamin (MULTIVITAMIN) tablet tablet Take 1 tablet by mouth Daily.    Provider, MD Lashanda   spironolactone (ALDACTONE) 25 MG tablet 1 Tablet by mouth every other day or as directed  Patient taking differently: Take 1 tablet by mouth Daily. 1 Tablet by mouth every other day or as directed 1/25/23   Wayne Black MD        Social History:   Social History     Tobacco Use    Smoking status: Never     Passive exposure: Never    Smokeless tobacco: Never   Substance Use Topics    Alcohol use: Not Currently    Drug use: No         Review of Systems:  Review of Systems   Constitutional:  Negative for chills, fatigue and fever.   HENT:  Negative for ear pain, rhinorrhea and sore throat.    Eyes:  Negative for visual disturbance.   Respiratory:  Negative for cough and shortness of breath.    Cardiovascular:  Negative for chest pain.   Gastrointestinal:  Negative for abdominal pain, diarrhea and vomiting.   Genitourinary:  Negative for difficulty urinating.   Musculoskeletal:  Positive for arthralgias, gait problem and myalgias. Negative for back pain.   Skin:  Negative for rash.   Neurological:  Negative for light-headedness and headaches.   Hematological:  Negative for adenopathy.   Psychiatric/Behavioral: Negative.          Physical Exam:  /79   Pulse 55   Temp 98.2 °F (36.8 °C) (Oral)   Resp 18   SpO2 97%     Physical Exam  Vitals and nursing note reviewed.   Constitutional:       General: He is not in acute distress.     Appearance: Normal appearance. He is not toxic-appearing.   HENT:      Head: Normocephalic and atraumatic.      Nose: Nose normal.      Mouth/Throat:      Mouth: Mucous membranes are moist.   Eyes:      Conjunctiva/sclera: Conjunctivae normal.   Cardiovascular:      Rate and Rhythm: Normal  rate.      Pulses: Normal pulses.   Pulmonary:      Effort: Pulmonary effort is normal.   Abdominal:      Palpations: Abdomen is soft.   Musculoskeletal:      Cervical back: Normal range of motion.      Comments: Patient states he cannot fully flex at the left knee due to pain in the thigh when he tries to do so.   Skin:     General: Skin is warm and dry.      Findings: Bruising (Bruising left leg dependent of abrasion and injury to left thigh.) present.      Comments: Abrasion to outer left thigh.   Neurological:      General: No focal deficit present.      Mental Status: He is alert and oriented to person, place, and time.   Psychiatric:         Mood and Affect: Mood normal.         Behavior: Behavior normal.         Thought Content: Thought content normal.         Judgment: Judgment normal.                  Procedures:  Procedures      Medical Decision Making:      Comorbidities that affect care:    Hypertension    External Notes reviewed:    None      The following orders were placed and all results were independently analyzed by me:  Orders Placed This Encounter   Procedures    XR Hip With or Without Pelvis 2 - 3 View Left       Medications Given in the Emergency Department:  Medications - No data to display     ED Course:         Labs:    Lab Results (last 24 hours)       ** No results found for the last 24 hours. **             Imaging:    XR Hip With or Without Pelvis 2 - 3 View Left    Result Date: 10/29/2023  PROCEDURE: XR HIP W OR WO PELVIS 2-3 VIEW LEFT  COMPARISON: [facility] [address], CR, XR HIP W OR WO PELVIS 2-3 VIEW LEFT, 10/20/2023, 11:47.  INDICATIONS: left hip and quadricep pain from fall  FINDINGS:  There is no evidence for acute fracture or dislocation. The bilateral hips are intact. No focal osseous abnormalities are seen.  Mild osteoarthritic degenerative changes of the bilateral hips are noted. Degenerative changes of the SI joints, pubic symphysis, and lower lumbosacral spine are  also noted. The sacral alae are intact. The SI joints are intact.  The soft tissues are unremarkable.        1. No evidence for acute displaced fracture or dislocation. 2. Degenerative changes are noted.        KORINA GALLARDO MD       Electronically Signed and Approved By: KORINA GALLARDO MD on 10/29/2023 at 15:13                Differential Diagnosis and Discussion:    Extremity Pain: Differential diagnosis includes but is not limited to soft tissue sprain, tendonitis, tendon injury, dislocation, fracture, deep vein thrombosis, arterial insufficiency, osteoarthritis, bursitis, and ligamentous damage.    All X-rays impressions were independently interpreted by me.    MDM  Number of Diagnoses or Management Options  Injury of left lower extremity, initial encounter  Diagnosis management comments: I have explained the patient´s condition, diagnoses and treatment plan based on the information available to me at this time. I have answered questions and addressed any concerns. The patient has a good  understanding of the patient´s diagnosis, condition, and treatment plan as can be expected at this point. The vital signs have been stable. The patient´s condition is stable and appropriate for discharge from the emergency department.      The patient will pursue further outpatient evaluation with the primary care physician or other designated or consulting physician as outlined in the discharge instructions. They are agreeable to this plan of care and follow-up instructions have been explained in detail. The patient has received these instructions in written format and have expressed an understanding of the discharge instructions. The patient is aware that any significant change in condition or worsening of symptoms should prompt an immediate return to this or the closest emergency department or call to 911.              Patient Care Considerations:    DUPLEX ULTRASOUND: I considered ordering a duplex ultrasound, however the  patient is low risk for dvt and has no signs of arterial occlusion.      Consultants/Shared Management Plan:    None    Social Determinants of Health:    Patient is independent, reliable, and has access to care.       Disposition and Care Coordination:    Discharged: The patient is suitable and stable for discharge with no need for consideration of observation or admission.    I have explained the patient´s condition, diagnoses and treatment plan based on the information available to me at this time. I have answered questions and addressed any concerns. The patient has a good  understanding of the patient´s diagnosis, condition, and treatment plan as can be expected at this point. The vital signs have been stable. The patient´s condition is stable and appropriate for discharge from the emergency department.      The patient will pursue further outpatient evaluation with the primary care physician or other designated or consulting physician as outlined in the discharge instructions. They are agreeable to this plan of care and follow-up instructions have been explained in detail. The patient has received these instructions in written format and have expressed an understanding of the discharge instructions. The patient is aware that any significant change in condition or worsening of symptoms should prompt an immediate return to this or the closest emergency department or call to 911.  I have explained discharge medications and the need for follow up with the patient/caretakers. This was also printed in the discharge instructions. Patient was discharged with the following medications and follow up:      Medication List        Changed      apixaban 5 MG tablet tablet  Commonly known as: ELIQUIS  Take 1 tablet by mouth 2 (Two) Times a Day.  What changed: additional instructions     losartan 25 MG tablet  Commonly known as: COZAAR  Take 1 tablet by mouth Daily.  What changed:   when to take this  additional instructions      spironolactone 25 MG tablet  Commonly known as: ALDACTONE  1 Tablet by mouth every other day or as directed  What changed:   how much to take  how to take this  when to take this           Yvonne Riojas, APRN  [address] [id number] [phone]    Go in 1 week  As needed       Final diagnoses:   Injury of left lower extremity, initial encounter        ED Disposition       ED Disposition   Discharge    Condition   Stable    Comment   --               This medical record created using voice recognition software.             Eve Bro, APRN  10/29/23 [id number]

## 2023-10-29 NOTE — DISCHARGE INSTRUCTIONS
Return to ED for worsening swelling of the left leg, if your foot becomes numb, cold or painful, for redness or warmth in the leg, fever or chills.  Follow-up with your primary care provider as needed if symptoms do not resolve.  May use ice and elevate the leg as tolerated.

## 2023-12-14 ENCOUNTER — OFFICE VISIT (OUTPATIENT)
Dept: CARDIOLOGY | Facility: CLINIC | Age: 68
End: 2023-12-14
Payer: COMMERCIAL

## 2023-12-14 VITALS
BODY MASS INDEX: 28.06 KG/M2 | HEIGHT: 74 IN | WEIGHT: 218.6 LBS | HEART RATE: 50 BPM | SYSTOLIC BLOOD PRESSURE: 145 MMHG | DIASTOLIC BLOOD PRESSURE: 96 MMHG

## 2023-12-14 DIAGNOSIS — Z98.890 HISTORY OF MITRAL VALVE REPAIR: ICD-10-CM

## 2023-12-14 DIAGNOSIS — I25.810 CORONARY ARTERY DISEASE INVOLVING CORONARY BYPASS GRAFT OF NATIVE HEART WITHOUT ANGINA PECTORIS: Primary | ICD-10-CM

## 2023-12-14 DIAGNOSIS — I10 PRIMARY HYPERTENSION: ICD-10-CM

## 2023-12-14 DIAGNOSIS — I48.0 PAROXYSMAL ATRIAL FIBRILLATION: Chronic | ICD-10-CM

## 2023-12-14 DIAGNOSIS — E78.00 HIGH CHOLESTEROL: Chronic | ICD-10-CM

## 2023-12-14 PROCEDURE — 99214 OFFICE O/P EST MOD 30 MIN: CPT

## 2023-12-14 RX ORDER — ATORVASTATIN CALCIUM 40 MG/1
40 TABLET, FILM COATED ORAL DAILY
Qty: 90 TABLET | Refills: 3 | Status: SHIPPED | OUTPATIENT
Start: 2023-12-14

## 2023-12-14 RX ORDER — SPIRONOLACTONE 25 MG/1
TABLET ORAL
Qty: 45 TABLET | Refills: 3 | Status: SHIPPED | OUTPATIENT
Start: 2023-12-14

## 2023-12-14 RX ORDER — LOSARTAN POTASSIUM 25 MG/1
25 TABLET ORAL DAILY
Qty: 90 TABLET | Refills: 3 | Status: SHIPPED | OUTPATIENT
Start: 2023-12-14

## 2023-12-14 NOTE — PROGRESS NOTES
Chief Complaint  Hypertension and Follow-up (1 YEAR F/U. )    Subjective        History of Present Illness  Jw Lazcano presents to [facility] CARDIOLOGY for follow up.  Patient is a 68-year-old male with past medical history outlined below, significant for coronary artery disease status post bypass x 1 and mitral valve repair in 2018, hypertension, hyperlipidemia who presents for routine annual follow-up.  He notes no chest pain or discomfort.  He is fairly active with no exertional complaints.  He denies any shortness of breath.  He fell recently and hurt his left leg and had significant bruising on the Eliquis but otherwise reports no bleeding issues.  He denies any dizziness, lightheadedness, syncope or presyncope.      Past Medical History:   Diagnosis Date    CAD (coronary artery disease)     S/p CABG 12/2018    Chest pain due to CAD 12/2018    CKD (chronic kidney disease), stage III     Coronary artery disease involving coronary bypass graft of native heart without angina pectoris 12/26/2018     with bypass x1 and mitral valve repair 12/27/2018    Coronary artery disease involving native coronary artery of native heart without angina pectoris 12/26/2018     with bypass x1 and mitral valve repair 12/27/2018    MCINTOSH (dyspnea on exertion)     Heart murmur     High blood pressure 12/07/2021    High cholesterol 12/07/2021    History of mitral valve repair 12/07/2021    Hyperlipidemia     Mild    Hypertension     Left atrial enlargement 12/24/2018    Noted on REINALDO    Mild aortic valve regurgitation 12/24/2018    Noted on REINALDO    Mitral valve insufficiency     S/p MVR 12/2018    Mitral valve prolapse 12/24/2018    Moderate to Severe Noted on REINALDO; S/p MVR 12/2018    Mitral valve regurgitation 12/21/18-[facility]    Severe Noted on Cardiac Cath & REINALDO-12/24/18    Paroxysmal atrial fibrillation 02/14/2019    SOB (shortness of breath) 12/2018       ALLERGY  Allergies   Allergen Reactions    Penicillins Rash         Past Surgical History:   Procedure Laterality Date    CARDIAC CATHETERIZATION Bilateral 12/21/2018-[facility]    w/L Ventriculography/Coronary Angiography--Single-Vessel CAD Noted w/Severe MVR    CARDIAC SURGERY      CHOLECYSTECTOMY      COLONOSCOPY  2015,,2023    COLONOSCOPY N/A 03/27/2023    Procedure: COLONOSCOPY with hot snare polypectomy;  Surgeon: Cyril Garcia MD;  Location: [facility] ENDOSCOPY;  Service: Gastroenterology;  Laterality: N/A;  colon polyps, diverticulosis    CORONARY ARTERY BYPASS GRAFT N/A 12/27/2018    Procedure: REINALDO STERNOTOMY CORONARY ARTERY BYPASS GRAFT TIMES 1 USING LEFT INTERNAL MAMMARY ARTERY AND LEFT GREATER SAPHENOUS VEIN GRAFT PER ENDOSCOPIC VEIN HARVESTING, MITRAL VALVE REPAIR AND PRP;  Surgeon: Miguel Lobo MD;  Location: [facility] OR;  Service: Cardiothoracic    MITRAL VALVE REPAIR/REPLACEMENT      NOSE SURGERY N/A     deviated septum    REINALDO  12/24/18-[facility]    Moderate-Severe MV Prolapse; Mild Calcific Change; L Atrial Enlargement; EF 65%; Mild AVR Noted; Severe MVR Noted    TONSILLECTOMY          Social History     Socioeconomic History    Marital status:    Tobacco Use    Smoking status: Never     Passive exposure: Never    Smokeless tobacco: Never   Vaping Use    Vaping Use: Never used   Substance and Sexual Activity    Alcohol use: Not Currently    Drug use: No    Sexual activity: Not Currently     Partners: Female     Birth control/protection: Vasectomy       Family History   Problem Relation Age of Onset    Valvular heart disease Father         In the 1970's    Malig Hyperthermia Neg Hx         Current Outpatient Medications on File Prior to Visit   Medication Sig    aspirin 81 MG EC tablet Take 1 tablet by mouth Every Other Day.    Multiple Vitamin (MULTIVITAMIN) tablet tablet Take 1 tablet by mouth Daily.    [DISCONTINUED] apixaban (ELIQUIS) 5 MG tablet tablet Take 1 tablet by mouth 2 (Two) Times a Day. (Patient taking differently: Take 1 tablet by mouth 2  "(Two) Times a Day. Last dose Friday 3/24/23)    [DISCONTINUED] atorvastatin (LIPITOR) 40 MG tablet Take 1 tablet by mouth Daily.    [DISCONTINUED] losartan (COZAAR) 25 MG tablet Take 1 tablet by mouth Daily. (Patient taking differently: Take 1 tablet by mouth Every Other Day. morning)    [DISCONTINUED] spironolactone (ALDACTONE) 25 MG tablet 1 Tablet by mouth every other day or as directed (Patient taking differently: Take 1 tablet by mouth Daily. 1 Tablet by mouth every other day or as directed)    [DISCONTINUED] cefdinir (OMNICEF) 300 MG capsule TAKE 1 CAPSULE BY MOUTH TWICE DAILY FOR 7 DAYS FOR INFECTION (TAKE WITH FOOD)     No current facility-administered medications on file prior to visit.       Objective   Vitals:    12/14/23 1146   BP: 145/96   Pulse: 50   Weight: 99.2 kg (218 lb 9.6 oz)   Height: 188 cm (74.02\")       Physical Exam  Constitutional:       General: He is awake. He is not in acute distress.     Appearance: Normal appearance.   HENT:      Head: Normocephalic.      Nose: Nose normal. No congestion.   Eyes:      Extraocular Movements: Extraocular movements intact.      Conjunctiva/sclera: Conjunctivae normal.      Pupils: Pupils are equal, round, and reactive to light.   Neck:      Thyroid: No thyromegaly.      Vascular: No JVD.   Cardiovascular:      Rate and Rhythm: Normal rate and regular rhythm.      Chest Wall: PMI is not displaced.      Pulses: Normal pulses.      Heart sounds: Normal heart sounds, S1 normal and S2 normal. No murmur heard.     No friction rub. No gallop. No S3 or S4 sounds.   Pulmonary:      Effort: Pulmonary effort is normal.      Breath sounds: Normal breath sounds. No wheezing, rhonchi or rales.   Abdominal:      General: Bowel sounds are normal.      Palpations: Abdomen is soft.      Tenderness: There is no abdominal tenderness.   Musculoskeletal:      Cervical back: No tenderness.      Right lower leg: No edema.      Left lower leg: No edema.   Lymphadenopathy:      " Cervical: No cervical adenopathy.   Skin:     General: Skin is warm and dry.      Capillary Refill: Capillary refill takes less than 2 seconds.      Coloration: Skin is not cyanotic.      Findings: No petechiae or rash.      Nails: There is no clubbing.   Neurological:      Mental Status: He is alert.   Psychiatric:         Mood and Affect: Mood normal.         Behavior: Behavior is cooperative.           Result Review     The following data was reviewed by ZULMA Montoya on 12/14/23.    proBNP   Date Value Ref Range Status   12/26/2018 208.9 5.0 - 900.0 pg/mL Final     CMP          2/9/2023    11:29   CMP   Glucose 85    BUN 22    Creatinine 1.24    EGFR 64.5    Sodium 139    Potassium 4.3    Chloride 104    Calcium 9.3    Total Protein 7.3    Albumin 4.0    Globulin 3.3    Total Bilirubin 0.5    Alkaline Phosphatase 112    AST (SGOT) 27    ALT (SGPT) 19    Albumin/Globulin Ratio 1.2    BUN/Creatinine Ratio 17.7    Anion Gap 8.0      CBC w/diff          2/9/2023    11:29   CBC w/Diff   WBC 11.80    RBC 4.95    Hemoglobin 15.4    Hematocrit 44.4    MCV 89.7    MCH 31.1    MCHC 34.7    RDW 12.7    Platelets 261    Neutrophil Rel % 66.5    Immature Granulocyte Rel % 0.5    Lymphocyte Rel % 20.7    Monocyte Rel % 7.9    Eosinophil Rel % 3.9    Basophil Rel % 0.5           Results for orders placed in visit on 12/08/21    Adult Transthoracic Echo Complete w/ Color, Spectral and Contrast if necessary per protocol    Interpretation Summary  · Left ventricular wall thickness is consistent with mild concentric hypertrophy.  · Estimated left ventricular EF was in agreement with the calculated left ventricular EF. Left ventricular ejection fraction appears to be 56 - 60%.  · Left ventricular diastolic function is consistent with (grade I) impaired relaxation.  · Left atrial volume is mildly increased.  · There is mild mitral regurgitation and trace aortic valve regurgitation noted           Procedures    Assessment  & Plan  Diagnoses and all orders for this visit:    1. Coronary artery disease involving coronary bypass graft of native heart without angina pectoris (Primary)    2. Paroxysmal atrial fibrillation  -     apixaban (ELIQUIS) 5 MG tablet tablet; Take 1 tablet by mouth 2 (Two) Times a Day.  Dispense: 180 tablet; Refill: 3    3. High cholesterol  -     atorvastatin (LIPITOR) 40 MG tablet; Take 1 tablet by mouth Daily.  Dispense: 90 tablet; Refill: 3    4. History of mitral valve repair    5. Primary hypertension    Other orders  -     losartan (COZAAR) 25 MG tablet; Take 1 tablet by mouth Daily.  Dispense: 90 tablet; Refill: 3  -     spironolactone (ALDACTONE) 25 MG tablet; 1 Tablet by mouth every other day or as directed  Dispense: 45 tablet; Refill: 3    1.  Stable without angina or anginal-like symptoms.  Continue current medical therapy.  2.  Continue Eliquis 5 mg twice daily.  He did have some significant bruising with a fall recently but notes no other bleeding issues or concerns.  3.  Continue statin therapy.  4.  Most recent echocardiogram demonstrated mild mitral regurgitation and trace aortic valve regurgitation.  Would consider repeating echocardiogram at next follow-up visit.  5.  Blood pressure is well-controlled on current regimen.  Continue the same.    Follow Up   Return in about 1 year (around 12/14/2024) for With Dr. Black.    Patient was given instructions and counseling regarding his condition or for health maintenance advice. Please see specific information pulled into the AVS if appropriate.     Prisca Albrecht, APRN  12/14/23  11:43 EST    Dictated Utilizing Dragon Dictation

## 2024-04-04 ENCOUNTER — OFFICE VISIT (OUTPATIENT)
Dept: SLEEP MEDICINE | Facility: HOSPITAL | Age: 69
End: 2024-04-04
Payer: COMMERCIAL

## 2024-04-04 VITALS — HEART RATE: 59 BPM | BODY MASS INDEX: 28.23 KG/M2 | OXYGEN SATURATION: 97 % | HEIGHT: 74 IN | WEIGHT: 220 LBS

## 2024-04-04 DIAGNOSIS — G47.8 NON-RESTORATIVE SLEEP: ICD-10-CM

## 2024-04-04 DIAGNOSIS — I48.0 PAROXYSMAL ATRIAL FIBRILLATION: Chronic | ICD-10-CM

## 2024-04-04 DIAGNOSIS — E66.3 OVERWEIGHT WITH BODY MASS INDEX (BMI) 25.0-29.9: ICD-10-CM

## 2024-04-04 DIAGNOSIS — R06.83 SNORING: ICD-10-CM

## 2024-04-04 DIAGNOSIS — G47.30 SLEEP APNEA, UNSPECIFIED TYPE: Primary | ICD-10-CM

## 2024-04-04 DIAGNOSIS — G47.10 HYPERSOMNIA: ICD-10-CM

## 2024-04-04 PROCEDURE — G0463 HOSPITAL OUTPT CLINIC VISIT: HCPCS

## 2024-04-04 NOTE — PROGRESS NOTES
[facility] New Patient/Consultation       Reason for Consultation: Sleep disturbances      Patient Care Team:  Yvonne Riojas APRN as PCP - General (Family Medicine)  Indira Patricio MD as Consulting Physician (Sleep Medicine)      History of present illness:  Thank you for asking me to see your patient.  The patient is a 69 y.o. male presents today with concern for sleep disorder.  Per cardiology records from December 2023 patient has CAD paroxysmal atrial fibrillation and primary hypertension with a history of mitral valve repair.  No history of prior sleep study.  Reports history of tonsillectomy and nasal surgery in the past.  Sleep latency 10 to 20 minutes sleep 6 to 8 hours 0 naps no rotating shifts.  Reports snoring witnessed apneas morning headaches waking with dry mouth waking up 5 to 10 pounds over the past 5 years nocturia 1-2 times a night.  BMI 28.2.    Medical Conditions (PMH):   Atrial fibrillation    Social history:  Do you drive a commercial vehicle:  No   Shift work:  No   Tobacco use:  No   Alcohol use: 1-2 per month  Caffeinated drinks: 2 per day  Occupation: [employer]    Family History (parents and siblings) (pertaining to sleep medicine):  Negative    Allergies:  Penicillins       Current Outpatient Medications:     apixaban (ELIQUIS) 5 MG tablet tablet, Take 1 tablet by mouth 2 (Two) Times a Day., Disp: 180 tablet, Rfl: 3    aspirin 81 MG EC tablet, Take 1 tablet by mouth Every Other Day., Disp: , Rfl:     atorvastatin (LIPITOR) 40 MG tablet, Take 1 tablet by mouth Daily., Disp: 90 tablet, Rfl: 3    losartan (COZAAR) 25 MG tablet, Take 1 tablet by mouth Daily., Disp: 90 tablet, Rfl: 3    Multiple Vitamin (MULTIVITAMIN) tablet tablet, Take 1 tablet by mouth Daily., Disp: , Rfl:     spironolactone (ALDACTONE) 25 MG tablet, 1 Tablet by mouth every other day or as directed, Disp: 45 tablet, Rfl: 3    Vital Signs:    Vitals:    04/04/24 1100   Pulse: 59   SpO2: 97%   Weight: 99.8 kg  "(220 lb)   Height: 188 cm (74.02\")      Body mass index is 28.23 kg/m².  Neck Circumference: 16.5 inches      REVIEW OF SYSTEMS:  Pertinent positive symptoms are:  Snoring  Witnessed apnea  [place] Sleepiness Scale of Total score: 8   Fatigue  Cough secondary to allergies      Physical exam:  Vitals:    04/04/24 1100   Pulse: 59   SpO2: 97%   Weight: 99.8 kg (220 lb)   Height: 188 cm (74.02\")    Body mass index is 28.23 kg/m². Neck Circumference: 16.5 inches  HEENT: Head is atraumatic, normocephalic  Eyes: pupils are round equal and reacting to light and accommodation, conjunctiva normal  Throat: tongue normal  NECK:Neck Circumference: 16.5 inches  RESPIRATORY SYSTEM: Regular respirations  CARDIOVASULAR SYSTEM: Regular rate  EXTREMITES: No cyanosis, clubbing  NEUROLOGICAL SYSTEM: Oriented x 3, no gross motor defects, gait normal      Impression:  1. Sleep apnea, unspecified type    2. Snoring    3. Overweight with body mass index (BMI) 25.0-29.9    4. Hypersomnia    5. Non-restorative sleep    6. Paroxysmal atrial fibrillation        Plan:    Office note(s) from care team reviewed. Office note(s) reviewed: 12/14/2023 cardiology    Labs/ Test Results Reviewed:      N/A          ASSESSMENT AND PLAN:   Witnessed apnea: patient's symptoms and physical examination are concerning for possible sleep apnea.   I discussed the signs, symptoms, and pathophysiology of sleep apnea with this patient.  I also discussed the potential complications of untreated sleep apnea including but not limited to resistant hypertension, insulin resistance, pulmonary hypertension, atrial fibrillation, heart attack, stroke, nonrestorative sleep with hypersomnia which can increase risk for motor vehicle accidents, etc.   Different testing methods including home-based and lab based sleep studies were discussed with this patient.   Based on patient history and physical examination, will proceed with home sleep study.  The order for the sleep study " is placed in [facility].  The test will be scheduled after prior authorization has been obtained through patient's insurance.  Treatment and management will be discussed in more detail with this patient after the test is completed.  All questions were answered to patient's satisfaction.   Snoring: snoring is the sound created by turbulent airflow vibrating upper airway soft tissue.  I have also discussed factors affecting snoring including sleep deprivation, sleeping on the back and alcohol ingestion. To minimize snoring, patient is advised to have adequate sleep, sleep on their side, and avoid alcohol and sedative medications around bedtime.   Excessive daytime sleepiness:  [place] Sleepiness Scale of Total score: 8.  There are many causes of excessive daytime sleepiness.  Rule out sleep apnea as a contributing factor, as above.  Do not drive, operate heavy machinery, or do activities that require high concentration if feeling tired/drowsy.  Overweight: Body mass index is 28.23 kg/m².. Patients who are overweight or obese are at increased risk of sleep apnea/ sleep disordered breathing. Weight reduction and healthy lifestyle are encouraged in overweight/ obese patients as part of a comprehensive approach to sleep apnea treatment.    Atrial fibrillation: Continue care per cardiology    I have also discussed with the patient the following  Sleep hygiene: try to maintain a regular bed time and wake time, avoid watching TV/ using electronic devices in bed (including cell phones), limit caffeinated and alcoholic beverages before bed, try to maintain a cool and quiet sleep environment, avoid daytime naps  Adequate amount of sleep: most people need around 7 to 8 hours of sleep each night      Patient will follow-up after study, 31 to 90 days after PAP therapy initiated if applicable, or contact the office sooner for questions or concerns. Patient's questions were answered.      Thank you for allowing me to participate in your  patient's care.    Indira Patricio MD  Sleep Medicine  04/04/24  11:59 EDT

## 2024-04-16 ENCOUNTER — HOSPITAL ENCOUNTER (OUTPATIENT)
Dept: SLEEP MEDICINE | Facility: HOSPITAL | Age: 69
Discharge: HOME | End: 2024-04-16
Admitting: FAMILY MEDICINE
Payer: COMMERCIAL

## 2024-04-16 DIAGNOSIS — I48.0 PAROXYSMAL ATRIAL FIBRILLATION: Chronic | ICD-10-CM

## 2024-04-16 DIAGNOSIS — R06.83 SNORING: ICD-10-CM

## 2024-04-16 DIAGNOSIS — E66.3 OVERWEIGHT WITH BODY MASS INDEX (BMI) 25.0-29.9: ICD-10-CM

## 2024-04-16 DIAGNOSIS — G47.8 NON-RESTORATIVE SLEEP: ICD-10-CM

## 2024-04-16 DIAGNOSIS — G47.30 SLEEP APNEA, UNSPECIFIED TYPE: ICD-10-CM

## 2024-04-16 DIAGNOSIS — G47.10 HYPERSOMNIA: ICD-10-CM

## 2024-04-16 PROCEDURE — 95806 SLEEP STUDY UNATT&RESP EFFT: CPT

## 2024-04-24 DIAGNOSIS — G47.33 OBSTRUCTIVE SLEEP APNEA: Primary | ICD-10-CM

## 2024-04-24 PROCEDURE — 95806 SLEEP STUDY UNATT&RESP EFFT: CPT | Performed by: FAMILY MEDICINE

## 2024-05-02 ENCOUNTER — TELEPHONE (OUTPATIENT)
Dept: SLEEP MEDICINE | Facility: HOSPITAL | Age: 69
End: 2024-05-02
Payer: COMMERCIAL

## 2024-07-09 ENCOUNTER — TELEMEDICINE (OUTPATIENT)
Dept: SLEEP MEDICINE | Facility: HOSPITAL | Age: 69
End: 2024-07-09
Payer: COMMERCIAL

## 2024-07-09 DIAGNOSIS — G47.33 OBSTRUCTIVE SLEEP APNEA: Primary | ICD-10-CM

## 2024-07-09 DIAGNOSIS — Z78.9 DIFFICULTY USING CONTINUOUS POSITIVE AIRWAY PRESSURE (CPAP) DEVICE: ICD-10-CM

## 2024-07-09 PROCEDURE — 99213 OFFICE O/P EST LOW 20 MIN: CPT | Performed by: FAMILY MEDICINE

## 2024-07-09 NOTE — PROGRESS NOTES
Follow Up Sleep Disorders Center Note     Chief Complaint:  FRANCA    The provider is located in [place] using a secure MyChart Video Visit through [employer]. Patient is being seen remotely via telehealth at their home address in [place], and stated that they are in a secure environment for this session. The patient's condition being diagnosed/treated is appropriate for telemedicine. The provider has identified herself as well as her credentials. The patient and/or patient's guardian, consent to be seen remotely, and when consent is given they understand that the consent allows for patient identifiable information to be sent to a third party as needed. They may refuse to be seen remotely at any time. The electronic data is encrypted and password protected, and the patient and/or guardian has been advised of the potential risks to privacy not withstanding such measures.   PT identifiers used: Name and .     You have chosen to receive care through a telehealth visit. Do you consent to use a video/audio connection for your medical care today? Yes.    Primary Care Physician: Yvonne Riojas APRN    Interval History:   The patient is a 69 y.o. male  who was last seen in the sleep lab: 2024 for home sleep study which showed AHI of 24.3 lowest SpO2 83%.  Advised auto CPAP 8-16 cm H2O.  Presents for first follow-up since starting PAP machine.  AHI residually elevated at 9.4; ARIANNA 5.2 OAI 0.7 hypopnea index 3.1.  Patient reports that time he is getting more used to mask and machine.  Has a fullface mask over the nose.  No mask fit issues.  Did struggle with finding the best position to sleep and as he sleeps better on his side; doing better sleeping on his side now however may benefit from CPAP pillow.  Air pressure is not disturbing at current settings.  Has noticed improvement in sleep quality with less sleep interruptions when he is using CPAP machine.  Does not always put mask and machine back on if he wakes up in the middle the  night to use the restroom or get a drink of water.    Downloaded PAP Data Reviewed For Compliance:  DME is [employer].  Downloads between 6/1/2024 - 6/30/2024.  Average usage is 3 hours 25 minutes.  Average AHI is 0.4.  Average auto CPAP pressure is 14.3 cm H2O    I have reviewed the above results and compared them with the patient's last downloads and reviewed with the patient.    Review of Systems:    A complete review of systems was done and all were negative with the exception of that of    Social History:    Social History     Socioeconomic History    Marital status:    Tobacco Use    Smoking status: Never     Passive exposure: Never    Smokeless tobacco: Never   Vaping Use    Vaping status: Never Used   Substance and Sexual Activity    Alcohol use: Not Currently    Drug use: No    Sexual activity: Not Currently     Partners: Female     Birth control/protection: Vasectomy       Allergies:  Penicillins     Medication Review:  Reviewed.      Impression:   1. Obstructive sleep apnea    2. Difficulty using continuous positive airway pressure (CPAP) device        Obstructive sleep apnea adequately treated with CPAP.  The patient is slowly improving compliance and usage with conditioning.  Reports improvement in hypersomnia nonrestorative sleep and interruptions in sleep.  Patient to consider CPAP pillow to help with sleeping on side more comfortably.  Needs more time to help with conditioning and decreasing central events.    Plan:  Good sleep hygiene measures should be maintained.      After evaluating the patient and assessing results available, the patient is benefiting from the treatment being provided.     The patient will continue auto CPAP.  After clinical evaluation and review of downloads, I recommend no changes to the patient's pressures.  A new prescription will be sent to the patient's DME.    Caution during activities that require prolonged concentration is strongly advised if sleepiness returns.  Changing of PAP supplies regularly is important for effective use. Patient needs to change cushion on the mask or plugs on nasal pillows along with disposable filters once every month and change mask frame, tubing, headgear and Velcro straps every 6 months at the minimum.    I answered all of the patient's questions.  The patient will call for any problems and will follow up in 3 months.      Indira Patricio MD  Sleep Medicine  07/09/24  12:51 EDT

## 2024-11-09 ENCOUNTER — TELEPHONE (OUTPATIENT)
Dept: CARDIOLOGY | Facility: CLINIC | Age: 69
End: 2024-11-09
Payer: COMMERCIAL

## 2024-11-09 NOTE — TELEPHONE ENCOUNTER
The [facility] received a fax that requires your attention. The document has been indexed to the patient’s chart for your review.      Reason for sending: EXTERNAL MEDICAL RECORD NOTIFICATION     Documents Description: LOSARTAN NON [id number]    Name of Sender: [employer]     Date Indexed: 11.7.24

## 2024-12-02 ENCOUNTER — OFFICE VISIT (OUTPATIENT)
Dept: CARDIOLOGY | Facility: CLINIC | Age: 69
End: 2024-12-02
Payer: COMMERCIAL

## 2024-12-02 VITALS
WEIGHT: 225 LBS | DIASTOLIC BLOOD PRESSURE: 100 MMHG | HEART RATE: 100 BPM | SYSTOLIC BLOOD PRESSURE: 130 MMHG | HEIGHT: 74 IN | BODY MASS INDEX: 28.88 KG/M2

## 2024-12-02 DIAGNOSIS — E78.2 MIXED HYPERLIPIDEMIA: ICD-10-CM

## 2024-12-02 DIAGNOSIS — I48.0 PAROXYSMAL ATRIAL FIBRILLATION: Primary | Chronic | ICD-10-CM

## 2024-12-02 DIAGNOSIS — E78.00 HIGH CHOLESTEROL: Chronic | ICD-10-CM

## 2024-12-02 DIAGNOSIS — I10 PRIMARY HYPERTENSION: ICD-10-CM

## 2024-12-02 PROCEDURE — 99214 OFFICE O/P EST MOD 30 MIN: CPT | Performed by: INTERNAL MEDICINE

## 2024-12-02 RX ORDER — ATORVASTATIN CALCIUM 40 MG/1
40 TABLET, FILM COATED ORAL DAILY
Qty: 90 TABLET | Refills: 3 | Status: SHIPPED | OUTPATIENT
Start: 2024-12-02

## 2024-12-02 RX ORDER — SPIRONOLACTONE 25 MG/1
TABLET ORAL
Qty: 45 TABLET | Refills: 3 | Status: SHIPPED | OUTPATIENT
Start: 2024-12-02

## 2024-12-02 NOTE — PROGRESS NOTES
Chief Complaint  Coronary Artery Disease (1 Year Follow Up)    Subjective        Jw Lazcano presents to [facility] CARDIOLOG[facility]  History of present illness:    Patient states overall he is doing well.  He is still very active.  He notes no exertional chest pain.  He denies any palpitations or edema.  He notes no bleeding problems.      Past Medical History:   Diagnosis Date    A-fib     CAD (coronary artery disease)     S/p CABG 12/2018    Chest pain due to CAD 12/2018    CKD (chronic kidney disease), stage III     Coronary artery disease involving coronary bypass graft of native heart without angina pectoris 12/26/2018     with bypass x1 and mitral valve repair 12/27/2018    Coronary artery disease involving native coronary artery of native heart without angina pectoris 12/26/2018     with bypass x1 and mitral valve repair 12/27/2018    MCINTOSH (dyspnea on exertion)     Heart murmur     High blood pressure 12/07/2021    High cholesterol 12/07/2021    History of mitral valve repair 12/07/2021    Hyperlipidemia     Mild    Hypertension     Left atrial enlargement 12/24/2018    Noted on REINALDO    Mild aortic valve regurgitation 12/24/2018    Noted on REINALDO    Mitral valve insufficiency     S/p MVR 12/2018    Mitral valve prolapse 12/24/2018    Moderate to Severe Noted on REINALDO; S/p MVR 12/2018    Mitral valve regurgitation 12/21/18-[facility]    Severe Noted on Cardiac Cath & REINALDO-12/24/18    Paroxysmal atrial fibrillation 02/14/2019    SOB (shortness of breath) 12/2018         Past Surgical History:   Procedure Laterality Date    CARDIAC CATHETERIZATION Bilateral 12/21/2018-[facility]    w/L Ventriculography/Coronary Angiography--Single-Vessel CAD Noted w/Severe MVR    CARDIAC SURGERY      CHOLECYSTECTOMY      COLONOSCOPY  2015,,2023    COLONOSCOPY N/A 03/27/2023    Procedure: COLONOSCOPY with hot snare polypectomy;  Surgeon: Cyril Garcia MD;  Location: [facility] ENDOSCOPY;  Service: Gastroenterology;  Laterality:  N/A;  colon polyps, diverticulosis    CORONARY ARTERY BYPASS GRAFT N/A 12/27/2018    Procedure: REINALDO STERNOTOMY CORONARY ARTERY BYPASS GRAFT TIMES 1 USING LEFT INTERNAL MAMMARY ARTERY AND LEFT GREATER SAPHENOUS VEIN GRAFT PER ENDOSCOPIC VEIN HARVESTING, MITRAL VALVE REPAIR AND PRP;  Surgeon: Miguel Lobo MD;  Location: [facility];  Service: Cardiothoracic    MITRAL VALVE REPAIR/REPLACEMENT      NOSE SURGERY N/A     deviated septum    REINALDO  12/24/18-[facility]    Moderate-Severe MV Prolapse; Mild Calcific Change; L Atrial Enlargement; EF 65%; Mild AVR Noted; Severe MVR Noted    TONSILLECTOMY            Social History     Socioeconomic History    Marital status:    Tobacco Use    Smoking status: Never     Passive exposure: Never    Smokeless tobacco: Never   Vaping Use    Vaping status: Never Used   Substance and Sexual Activity    Alcohol use: Not Currently    Drug use: No    Sexual activity: Not Currently     Partners: Female     Birth control/protection: Vasectomy         Family History   Problem Relation Age of Onset    Valvular heart disease Father         In the 1970's    Malig Hyperthermia Neg Hx           Allergies   Allergen Reactions    Penicillins Rash            Current Outpatient Medications:     apixaban (ELIQUIS) 5 MG tablet tablet, Take 1 tablet by mouth 2 (Two) Times a Day., Disp: 180 tablet, Rfl: 3    aspirin 81 MG EC tablet, Take 1 tablet by mouth Every Other Day., Disp: , Rfl:     atorvastatin (LIPITOR) 40 MG tablet, Take 1 tablet by mouth Daily., Disp: 90 tablet, Rfl: 3    losartan (COZAAR) 25 MG tablet, Take 1 tablet by mouth Daily. (Patient taking differently: Take 1 tablet by mouth Every Other Day.), Disp: 90 tablet, Rfl: 3    Multiple Vitamin (MULTIVITAMIN) tablet tablet, Take 1 tablet by mouth Daily., Disp: , Rfl:     spironolactone (ALDACTONE) 25 MG tablet, 1 Tablet by mouth every other day or as directed, Disp: 45 tablet, Rfl: 3      ROS:  Cardiac review of systems  "negative.    Objective     /100   Pulse 100   Ht 188 cm (74\")   Wt 102 kg (225 lb)   BMI 28.89 kg/m²       General Appearance:   well developed  well nourished  HENT:   oropharynx moist  lips not cyanotic  Respiratory:  no respiratory distress  normal breath sounds  no rales  Cardiovascular:  no jugular venous distention  regular rhythm  S1 normal, S2 normal  no S3, no S4   no murmur  no rub, no thrill  No carotid bruit  pedal pulses normal  lower extremity edema: none    Musculoskeletal:  no clubbing of fingers.   normocephalic, head atraumatic  Skin:   warm, dry  Psychiatric:  judgement and insight appropriate  normal mood and affect    ECHO:  Results for orders placed in visit on 12/08/21    Adult Transthoracic Echo Complete w/ Color, Spectral and Contrast if necessary per protocol    Interpretation Summary  · Left ventricular wall thickness is consistent with mild concentric hypertrophy.  · Estimated left ventricular EF was in agreement with the calculated left ventricular EF. Left ventricular ejection fraction appears to be 56 - 60%.  · Left ventricular diastolic function is consistent with (grade I) impaired relaxation.  · Left atrial volume is mildly increased.  · There is mild mitral regurgitation and trace aortic valve regurgitation noted    STRESS:    CATH:  No results found for this or any previous visit.    BMP:     Glucose   Date Value Ref Range Status   02/09/2023 85 65 - 99 mg/dL Final     BUN   Date Value Ref Range Status   02/09/2023 22 8 - 23 mg/dL Final     Creatinine   Date Value Ref Range Status   02/09/2023 1.24 0.76 - 1.27 mg/dL Final     Sodium   Date Value Ref Range Status   02/09/2023 139 136 - 145 mmol/L Final     Potassium   Date Value Ref Range Status   02/09/2023 4.3 3.5 - 5.2 mmol/L Final     Chloride   Date Value Ref Range Status   02/09/2023 104 98 - 107 mmol/L Final     CO2   Date Value Ref Range Status   02/09/2023 27.0 22.0 - 29.0 mmol/L Final     Calcium   Date Value " Ref Range Status   02/09/2023 9.3 8.6 - 10.5 mg/dL Final     BUN/Creatinine Ratio   Date Value Ref Range Status   02/09/2023 17.7 7.0 - 25.0 Final     Anion Gap   Date Value Ref Range Status   02/09/2023 8.0 5.0 - 15.0 mmol/L Final     eGFR   Date Value Ref Range Status   02/09/2023 64.5 >60.0 mL/min/1.73 Final     LIPIDS:  Total Cholesterol   Date Value Ref Range Status   05/04/2022 120 0 - 200 mg/dL Final     Triglycerides   Date Value Ref Range Status   05/04/2022 81 0 - 150 mg/dL Final     HDL Cholesterol   Date Value Ref Range Status   05/04/2022 35 (L) 40 - 60 mg/dL Final     LDL Cholesterol    Date Value Ref Range Status   05/04/2022 69 0 - 100 mg/dL Final     VLDL Cholesterol   Date Value Ref Range Status   05/04/2022 16 5 - 40 mg/dL Final     LDL/HDL Ratio   Date Value Ref Range Status   05/04/2022 1.97  Final         Procedures             ASSESSMENT:  Diagnoses and all orders for this visit:    1. Paroxysmal atrial fibrillation (Primary)  -     apixaban (ELIQUIS) 5 MG tablet tablet; Take 1 tablet by mouth 2 (Two) Times a Day.  Dispense: 180 tablet; Refill: 3    2. Primary hypertension    3. Mixed hyperlipidemia    4. High cholesterol  -     atorvastatin (LIPITOR) 40 MG tablet; Take 1 tablet by mouth Daily.  Dispense: 90 tablet; Refill: 3    Other orders  -     spironolactone (ALDACTONE) 25 MG tablet; 1 Tablet by mouth every other day or as directed  Dispense: 45 tablet; Refill: 3         PLAN:    1.  Patient was put on Eliquis when he had SVTs possibly consistent with atrial fibrillation per the previous cardiologist.  He is tolerating it with no bleeding problems.  He notes no palpitations.  2.  Patient remains active with no chest pain.  3.  Will repeat an echocardiogram as it has been 3 years since the last 1 looking at his LV function along with his previous mitral valve repair.  4.  Blood pressures elevated.  He apparently is taking the losartan every other day.  He states this was due to low blood  pressures.  I did ask him to cut this in half and take 12.5 mg every day as some afraid that his blood pressure is going to be very labile.  He is going to continue to monitor it closer at home.  4.  Continue the Lipitor.  5.  Encouraged the patient to continue to remain active and call us if any exertional chest pain.      Return in about 1 year (around 12/2/2025).     Patient was given instructions and counseling regarding his condition or for health maintenance advice. Please see specific information pulled into the AVS if appropriate.         Wayne Black MD   12/2/2024  12:51 EST

## 2025-01-14 ENCOUNTER — HOSPITAL ENCOUNTER (OUTPATIENT)
Facility: HOSPITAL | Age: 70
Discharge: HOME | End: 2025-01-14
Admitting: INTERNAL MEDICINE
Payer: MEDICARE

## 2025-01-14 DIAGNOSIS — I48.0 PAROXYSMAL ATRIAL FIBRILLATION: Chronic | ICD-10-CM

## 2025-01-14 PROCEDURE — 93306 TTE W/DOPPLER COMPLETE: CPT

## 2025-01-15 LAB
AORTIC DIMENSIONLESS INDEX: 0.72 (DI)
ASCENDING AORTA: 4.6 CM
AV MEAN PRESS GRAD SYS DOP V1V2: 7 MMHG
AV VMAX SYS DOP: 184 CM/SEC
BH CV ECHO MEAS - AO MAX PG: 13.5 MMHG
BH CV ECHO MEAS - AO ROOT DIAM: 3.6 CM
BH CV ECHO MEAS - AO V2 VTI: 46 CM
BH CV ECHO MEAS - AVA(I,D): 3 CM2
BH CV ECHO MEAS - EDV(CUBED): 125 ML
BH CV ECHO MEAS - EDV(MOD-SP2): 136 ML
BH CV ECHO MEAS - EDV(MOD-SP4): 145 ML
BH CV ECHO MEAS - EF(MOD-SP2): 58.6 %
BH CV ECHO MEAS - EF(MOD-SP4): 54.5 %
BH CV ECHO MEAS - ESV(CUBED): 42.9 ML
BH CV ECHO MEAS - ESV(MOD-SP2): 56.3 ML
BH CV ECHO MEAS - ESV(MOD-SP4): 66 ML
BH CV ECHO MEAS - FS: 30 %
BH CV ECHO MEAS - IVS/LVPW: 1 CM
BH CV ECHO MEAS - IVSD: 1.3 CM
BH CV ECHO MEAS - LA DIMENSION: 5.1 CM
BH CV ECHO MEAS - LAT PEAK E' VEL: 6.3 CM/SEC
BH CV ECHO MEAS - LV MASS(C)D: 261.8 GRAMS
BH CV ECHO MEAS - LV MAX PG: 7.7 MMHG
BH CV ECHO MEAS - LV MEAN PG: 3 MMHG
BH CV ECHO MEAS - LV V1 MAX: 139 CM/SEC
BH CV ECHO MEAS - LV V1 VTI: 33.1 CM
BH CV ECHO MEAS - LVIDD: 5 CM
BH CV ECHO MEAS - LVIDS: 3.5 CM
BH CV ECHO MEAS - LVOT AREA: 4.2 CM2
BH CV ECHO MEAS - LVOT DIAM: 2.3 CM
BH CV ECHO MEAS - LVPWD: 1.3 CM
BH CV ECHO MEAS - MED PEAK E' VEL: 4.8 CM/SEC
BH CV ECHO MEAS - MV A MAX VEL: 134 CM/SEC
BH CV ECHO MEAS - MV DEC SLOPE: 237 CM/SEC2
BH CV ECHO MEAS - MV DEC TIME: 0.45 SEC
BH CV ECHO MEAS - MV E MAX VEL: 107 CM/SEC
BH CV ECHO MEAS - MV E/A: 0.8
BH CV ECHO MEAS - MV MEAN PG: 3 MMHG
BH CV ECHO MEAS - MV P1/2T: 169.3 MSEC
BH CV ECHO MEAS - MV V2 VTI: 68.6 CM
BH CV ECHO MEAS - MVA(P1/2T): 1.3 CM2
BH CV ECHO MEAS - MVA(VTI): 2 CM2
BH CV ECHO MEAS - PA V2 MAX: 103.5 CM/SEC
BH CV ECHO MEAS - PI END-D VEL: 103 CM/SEC
BH CV ECHO MEAS - SV(LVOT): 137.5 ML
BH CV ECHO MEAS - SV(MOD-SP2): 79.7 ML
BH CV ECHO MEAS - SV(MOD-SP4): 79 ML
BH CV ECHO MEAS - TAPSE (>1.6): 1.7 CM
BH CV ECHO MEAS - TR MAX PG: 19.4 MMHG
BH CV ECHO MEAS - TR MAX VEL: 220 CM/SEC
BH CV ECHO MEASUREMENTS AVERAGE E/E' RATIO: 19.28
BH CV XLRA - TDI S': 9.8 CM/SEC
IVRT: 123 MS
LEFT ATRIUM VOLUME INDEX: 45.2 ML/M2
LV EF BIPLANE MOD: 55.4 %

## 2025-01-22 RX ORDER — LOSARTAN POTASSIUM 25 MG/1
25 TABLET ORAL DAILY
Qty: 90 TABLET | Refills: 0 | OUTPATIENT
Start: 2025-01-22

## 2025-01-24 ENCOUNTER — LAB (OUTPATIENT)
Dept: LAB | Facility: HOSPITAL | Age: 70
End: 2025-01-24
Payer: MEDICARE

## 2025-01-24 ENCOUNTER — OFFICE VISIT (OUTPATIENT)
Dept: FAMILY MEDICINE CLINIC | Facility: CLINIC | Age: 70
End: 2025-01-24
Payer: MEDICARE

## 2025-01-24 VITALS
HEIGHT: 74 IN | BODY MASS INDEX: 29.9 KG/M2 | HEART RATE: 54 BPM | WEIGHT: 233 LBS | OXYGEN SATURATION: 99 % | DIASTOLIC BLOOD PRESSURE: 91 MMHG | SYSTOLIC BLOOD PRESSURE: 133 MMHG | TEMPERATURE: 98.4 F

## 2025-01-24 DIAGNOSIS — E78.5 HYPERLIPIDEMIA, UNSPECIFIED HYPERLIPIDEMIA TYPE: ICD-10-CM

## 2025-01-24 DIAGNOSIS — E78.5 HYPERLIPIDEMIA, UNSPECIFIED HYPERLIPIDEMIA TYPE: Primary | ICD-10-CM

## 2025-01-24 DIAGNOSIS — Z00.00 ANNUAL PHYSICAL EXAM: ICD-10-CM

## 2025-01-24 DIAGNOSIS — Z11.59 NEED FOR HEPATITIS C SCREENING TEST: ICD-10-CM

## 2025-01-24 DIAGNOSIS — Z00.00 MEDICARE ANNUAL WELLNESS VISIT, INITIAL: ICD-10-CM

## 2025-01-24 DIAGNOSIS — Z12.5 SCREENING FOR PROSTATE CANCER: ICD-10-CM

## 2025-01-24 DIAGNOSIS — Z13.1 SCREENING FOR DIABETES MELLITUS: ICD-10-CM

## 2025-01-24 DIAGNOSIS — Z13.29 SCREENING FOR THYROID DISORDER: ICD-10-CM

## 2025-01-24 LAB
ALBUMIN SERPL-MCNC: 3.9 G/DL (ref 3.5–5.2)
ALBUMIN/GLOB SERPL: 1.3 G/DL
ALP SERPL-CCNC: 137 U/L (ref 39–117)
ALT SERPL W P-5'-P-CCNC: 34 U/L (ref 1–41)
ANION GAP SERPL CALCULATED.3IONS-SCNC: 8 MMOL/L (ref 5–15)
AST SERPL-CCNC: 32 U/L (ref 1–40)
BASOPHILS # BLD AUTO: 0.05 10*3/MM3 (ref 0–0.2)
BASOPHILS NFR BLD AUTO: 0.6 % (ref 0–1.5)
BILIRUB SERPL-MCNC: 0.3 MG/DL (ref 0–1.2)
BUN SERPL-MCNC: 18 MG/DL (ref 8–23)
BUN/CREAT SERPL: 14.6 (ref 7–25)
CALCIUM SPEC-SCNC: 9 MG/DL (ref 8.6–10.5)
CHLORIDE SERPL-SCNC: 105 MMOL/L (ref 98–107)
CHOLEST SERPL-MCNC: 119 MG/DL (ref 0–200)
CO2 SERPL-SCNC: 25 MMOL/L (ref 22–29)
CREAT SERPL-MCNC: 1.23 MG/DL (ref 0.76–1.27)
DEPRECATED RDW RBC AUTO: 40.7 FL (ref 37–54)
EGFRCR SERPLBLD CKD-EPI 2021: 64.3 ML/MIN/1.73
EOSINOPHIL # BLD AUTO: 0.46 10*3/MM3 (ref 0–0.4)
EOSINOPHIL NFR BLD AUTO: 5.4 % (ref 0.3–6.2)
ERYTHROCYTE [DISTWIDTH] IN BLOOD BY AUTOMATED COUNT: 12.5 % (ref 12.3–15.4)
GLOBULIN UR ELPH-MCNC: 3 GM/DL
GLUCOSE SERPL-MCNC: 92 MG/DL (ref 65–99)
HBA1C MFR BLD: 5.6 % (ref 4.8–5.6)
HCT VFR BLD AUTO: 45.4 % (ref 37.5–51)
HCV AB SER QL: NORMAL
HDLC SERPL-MCNC: 37 MG/DL (ref 40–60)
HGB BLD-MCNC: 15.4 G/DL (ref 13–17.7)
IMM GRANULOCYTES # BLD AUTO: 0.05 10*3/MM3 (ref 0–0.05)
IMM GRANULOCYTES NFR BLD AUTO: 0.6 % (ref 0–0.5)
LDLC SERPL CALC-MCNC: 62 MG/DL (ref 0–100)
LDLC/HDLC SERPL: 1.65 {RATIO}
LYMPHOCYTES # BLD AUTO: 2.25 10*3/MM3 (ref 0.7–3.1)
LYMPHOCYTES NFR BLD AUTO: 26.6 % (ref 19.6–45.3)
MCH RBC QN AUTO: 30.4 PG (ref 26.6–33)
MCHC RBC AUTO-ENTMCNC: 33.9 G/DL (ref 31.5–35.7)
MCV RBC AUTO: 89.7 FL (ref 79–97)
MONOCYTES # BLD AUTO: 0.62 10*3/MM3 (ref 0.1–0.9)
MONOCYTES NFR BLD AUTO: 7.3 % (ref 5–12)
NEUTROPHILS NFR BLD AUTO: 5.02 10*3/MM3 (ref 1.7–7)
NEUTROPHILS NFR BLD AUTO: 59.5 % (ref 42.7–76)
NRBC BLD AUTO-RTO: 0 /100 WBC (ref 0–0.2)
PLATELET # BLD AUTO: 232 10*3/MM3 (ref 140–450)
PMV BLD AUTO: 10.6 FL (ref 6–12)
POTASSIUM SERPL-SCNC: 4 MMOL/L (ref 3.5–5.2)
PROT SERPL-MCNC: 6.9 G/DL (ref 6–8.5)
PSA SERPL-MCNC: 1.16 NG/ML (ref 0–4)
RBC # BLD AUTO: 5.06 10*6/MM3 (ref 4.14–5.8)
SODIUM SERPL-SCNC: 138 MMOL/L (ref 136–145)
TRIGL SERPL-MCNC: 105 MG/DL (ref 0–150)
TSH SERPL DL<=0.05 MIU/L-ACNC: 2.78 UIU/ML (ref 0.27–4.2)
VLDLC SERPL-MCNC: 20 MG/DL (ref 5–40)
WBC NRBC COR # BLD AUTO: 8.45 10*3/MM3 (ref 3.4–10.8)

## 2025-01-24 PROCEDURE — G0103 PSA SCREENING: HCPCS

## 2025-01-24 PROCEDURE — 86803 HEPATITIS C AB TEST: CPT

## 2025-01-24 PROCEDURE — 83036 HEMOGLOBIN GLYCOSYLATED A1C: CPT

## 2025-01-24 PROCEDURE — 80053 COMPREHEN METABOLIC PANEL: CPT

## 2025-01-24 PROCEDURE — 84443 ASSAY THYROID STIM HORMONE: CPT

## 2025-01-24 PROCEDURE — 36415 COLL VENOUS BLD VENIPUNCTURE: CPT

## 2025-01-24 PROCEDURE — 85025 COMPLETE CBC W/AUTO DIFF WBC: CPT

## 2025-01-24 PROCEDURE — 80061 LIPID PANEL: CPT

## 2025-01-24 NOTE — PROGRESS NOTES
Subjective   The ABCs of the Annual Wellness Visit  Medicare Wellness Visit      Jw Lazcano is a 69 y.o. patient who presents for a Medicare Wellness Visit.    The following portions of the patient's history were reviewed and   updated as appropriate: allergies, current medications, past family history, past medical history, past social history, past surgical history, and problem list.    Compared to one year ago, the patient's physical   health is the same.  Compared to one year ago, the patient's mental   health is the same.    Recent Hospitalizations:  He was not admitted to the hospital during the last year.     Current Medical Providers:  Patient Care Team:  Yvonne Riojas APRN as PCP - General (Family Medicine)    Outpatient Medications Prior to Visit   Medication Sig Dispense Refill    apixaban (ELIQUIS) 5 MG tablet tablet Take 1 tablet by mouth 2 (Two) Times a Day. 180 tablet 3    aspirin 81 MG EC tablet Take 1 tablet by mouth Every Other Day.      atorvastatin (LIPITOR) 40 MG tablet Take 1 tablet by mouth Daily. 90 tablet 3    losartan (COZAAR) 25 MG tablet Take 1 tablet by mouth Daily. (Patient taking differently: Take 0.5 tablets by mouth Daily.) 90 tablet 3    Multiple Vitamin (MULTIVITAMIN) tablet tablet Take 1 tablet by mouth Daily.      spironolactone (ALDACTONE) 25 MG tablet 1 Tablet by mouth every other day or as directed 45 tablet 3     No facility-administered medications prior to visit.     No opioid medication identified on active medication list. I have reviewed chart for other potential  high risk medication/s and harmful drug interactions in the elderly.      Aspirin is on active medication list. Aspirin use is indicated based on review of current medical condition/s. Pros and cons of this therapy have been discussed today. Benefits of this medication outweigh potential harm.  Patient has been encouraged to continue taking this medication.  .      Patient Active Problem List  "  Diagnosis    Non-rheumatic mitral regurgitation    CKD (chronic kidney disease) stage 3, GFR 30-59 ml/min    Coronary artery disease involving coronary bypass graft of native heart without angina pectoris    CHF (congestive heart failure), NYHA class III, acute on chronic, combined    Junctional rhythm    Paroxysmal atrial fibrillation    Nonsustained ventricular tachycardia    High blood pressure    High cholesterol    History of mitral valve repair    Encounter for screening colonoscopy    Personal history of colonic polyps     Advance Care Planning Advance Directive is not on file.  ACP discussion was declined by the patient. Patient does not have an advance directive, declines further assistance.            Objective   Vitals:    25 0809   BP: 133/91   BP Location: Left arm   Patient Position: Sitting   Cuff Size: Adult   Pulse: 54   Temp: 98.4 °F (36.9 °C)   TempSrc: Temporal   SpO2: 99%   Weight: 106 kg (233 lb)   Height: 188 cm (74\")   PainSc: 0-No pain       Estimated body mass index is 29.92 kg/m² as calculated from the following:    Height as of this encounter: 188 cm (74\").    Weight as of this encounter: 106 kg (233 lb).                Does the patient have evidence of cognitive impairment? No                                                                                                Health  Risk Assessment    Smoking Status:  Social History     Tobacco Use   Smoking Status Never    Passive exposure: Never   Smokeless Tobacco Never     Alcohol Consumption:  Social History     Substance and Sexual Activity   Alcohol Use Not Currently       Fall Risk Screen  STEADI Fall Risk Assessment was completed, and patient is at LOW risk for falls.Assessment completed on:2025    Depression Screening   Little interest or pleasure in doing things? Not at all   Feeling down, depressed, or hopeless? Not at all   PHQ-2 Total Score 0      Health Habits and Functional and Cognitive Screenin/24/2025 "     8:00 AM   Functional & Cognitive Status   Do you have difficulty preparing food and eating? No   Do you have difficulty bathing yourself, getting dressed or grooming yourself? No   Do you have difficulty using the toilet? No   Do you have difficulty moving around from place to place? No   Do you have trouble with steps or getting out of a bed or a chair? No   Current Diet Well Balanced Diet   Dental Exam Up to date   Eye Exam Up to date   Exercise (times per week) 3 times per week   Current Exercises Include Yard Work   Do you need help using the phone?  No   Are you deaf or do you have serious difficulty hearing?  No   Do you need help to go to places out of walking distance? No   Do you need help shopping? No   Do you need help preparing meals?  No   Do you need help with housework?  No   Do you need help with laundry? No   Do you need help taking your medications? No   Do you need help managing money? No   Do you ever drive or ride in a car without wearing a seat belt? No   Have you felt unusual stress, anger or loneliness in the last month? No   Who do you live with? Spouse   If you need help, do you have trouble finding someone available to you? No   Have you been bothered in the last four weeks by sexual problems? No   Do you have difficulty concentrating, remembering or making decisions? No           Age-appropriate Screening Schedule:  Refer to the list below for future screening recommendations based on patient's age, sex and/or medical conditions. Orders for these recommended tests are listed in the plan section. The patient has been provided with a written plan.    Health Maintenance List  Health Maintenance   Topic Date Due    Pneumococcal Vaccine 65+ (1 of 2 - PCV) Never done    TDAP/TD VACCINES (2 - Tdap) 09/01/2010    HEPATITIS C SCREENING  Never done    ZOSTER VACCINE (2 of 2) 12/24/2021    LIPID PANEL  05/04/2023    COVID-19 Vaccine (5 - 2024-25 season) 09/01/2024    ANNUAL WELLNESS VISIT   "01/24/2026    BMI FOLLOWUP  01/24/2026    COLORECTAL CANCER SCREENING  03/27/2028    INFLUENZA VACCINE  Completed                                                                                                                                                CMS Preventative Services Quick Reference  Risk Factors Identified During Encounter  None Identified    The above risks/problems have been discussed with the patient.  Pertinent information has been shared with the patient in the After Visit Summary.  An After Visit Summary and PPPS were made available to the patient.    Follow Up:   Next Medicare Wellness visit to be scheduled in 1 year.         Additional E&M Note during same encounter follows:  Patient has additional, significant, and separately identifiable condition(s)/problem(s) that require work above and beyond the Medicare Wellness Visit     Chief Complaint  Medicare Wellness-Initial Visit    Subjective   HPI                  Objective   Vital Signs:  /91 (BP Location: Left arm, Patient Position: Sitting, Cuff Size: Adult)   Pulse 54   Temp 98.4 °F (36.9 °C) (Temporal)   Ht 188 cm (74\")   Wt 106 kg (233 lb)   SpO2 99%   BMI 29.92 kg/m²   Physical Exam  Constitutional:       Appearance: Normal appearance.   HENT:      Nose: Nose normal.      Mouth/Throat:      Mouth: Mucous membranes are moist.   Cardiovascular:      Rate and Rhythm: Normal rate and regular rhythm.      Pulses: Normal pulses.      Heart sounds: Normal heart sounds.   Pulmonary:      Effort: Pulmonary effort is normal.      Breath sounds: Normal breath sounds.   Skin:     General: Skin is warm and dry.   Neurological:      General: No focal deficit present.      Mental Status: He is alert and oriented to person, place, and time.   Psychiatric:         Mood and Affect: Mood normal.         Behavior: Behavior normal.                       Assessment and Plan            Need for hepatitis C screening test    Orders:    Hepatitis C " Antibody; Future    Hyperlipidemia, unspecified hyperlipidemia type   Lipid abnormalities are stable    Plan:  Continue same medication/s without change.      Discussed medication dosage, use, side effects, and goals of treatment in detail.    Counseled patient on lifestyle modifications to help control hyperlipidemia.     Patient Treatment Goals:   LDL goal is less than 70    Followup in 6 months.    Orders:    Lipid Panel; Future    Screening for thyroid disorder    Orders:    TSH; Future    Medicare annual wellness visit, initial    Orders:    Comprehensive Metabolic Panel; Future    CBC & Differential; Future    Screening for prostate cancer    Orders:    PSA Screen; Future    Screening for diabetes mellitus    Orders:    Hemoglobin A1c; Future            Follow Up   Return if symptoms worsen or fail to improve.  Patient was given instructions and counseling regarding his condition or for health maintenance advice. Please see specific information pulled into the AVS if appropriate.

## 2025-02-07 NOTE — TELEPHONE ENCOUNTER
Medication failed protocol for bp under 130/80. Recent bp's are:  12/2/24 - 130/100  12/14/23 - 145/95  12/29/22 - 129/88  Please advise on medication refill.

## 2025-02-10 RX ORDER — LOSARTAN POTASSIUM 25 MG/1
25 TABLET ORAL DAILY
Qty: 90 TABLET | Refills: 0 | Status: SHIPPED | OUTPATIENT
Start: 2025-02-10

## 2025-08-07 RX ORDER — LOSARTAN POTASSIUM 25 MG/1
12.5 TABLET ORAL DAILY
Qty: 90 TABLET | Refills: 0 | Status: SHIPPED | OUTPATIENT
Start: 2025-08-07

## (undated) DEVICE — SUT PROLN 5/0 RB1 D/A 36IN 8556H

## (undated) DEVICE — SUT VIC 0 CT1 CR8 27IN JJ41G

## (undated) DEVICE — SNAR E/S POLYP SNAREMASTER OVL/10MM 2.8X2300MM YEL

## (undated) DEVICE — SOL ISO/ALC RUB 70PCT 4OZ

## (undated) DEVICE — SOLIDIFIER LIQLOC PLS 1500CC BT

## (undated) DEVICE — BIOPATCH™ ANTIMICROBIAL DRESSING WITH CHLORHEXIDINE GLUCONATE IS A HYDROPHILLIC POLYURETHANE ABSORPTIVE FOAM WITH CHLORHEXIDINE GLUCONATE (CHG) WHICH INHIBITS BACTERIAL GROWTH UNDER THE DRESSING. THE DRESSING IS INTENDED TO BE USED TO ABSORB EXUDATE, COVER A WOUND CAUSED BY VASCULAR AND NONVASCULAR PERCUTANEOUS MEDICAL DEVICES DURING SURGERY, AS WELL AS REDUCE LOCAL INFECTION AND COLONIZATION OF MICROORGANISMS.: Brand: BIOPATCH

## (undated) DEVICE — LINER SURG CANSTR SXN S/RIGD 1500CC

## (undated) DEVICE — HEMOCONCENTRATOR PERFUS LPS06

## (undated) DEVICE — PK HEART OPN 40

## (undated) DEVICE — Device

## (undated) DEVICE — SUT PROLN 4/0 BB D/A 36IN 8581H

## (undated) DEVICE — Device: Brand: LEVEL 1

## (undated) DEVICE — GLV SURG BIOGEL LTX PF 7

## (undated) DEVICE — ST TOURNI COMPL A/ 7IN

## (undated) DEVICE — ST. SORBAVIEW ULTIMATE IJ SYSTEM A,C: Brand: CENTURION

## (undated) DEVICE — LOU OPEN HEART DR POLLOCK: Brand: MEDLINE INDUSTRIES, INC.

## (undated) DEVICE — ADHS SKIN DERMABOND TOP ADVANCED

## (undated) DEVICE — BLOWER/MISTER AXIOUS OPCAB W/TBG

## (undated) DEVICE — PK ATS CUST W CARDIOTOMY RESEVOIR

## (undated) DEVICE — SUT SILK 0 CT1 CR8 18IN C021D

## (undated) DEVICE — SPNG GZ WOVN 4X4IN 12PLY 10/BX STRL

## (undated) DEVICE — DRSNG SURESITE WNDW 2.38X2.75

## (undated) DEVICE — SUT PROLN 3/0 SH D/A 36IN 8522H

## (undated) DEVICE — CORONARY ARTERY BYPASS GRAFT MARKERS, STAINLESS STEEL, DISTAL, WITHOUT HOLDER: Brand: ANASTOMARK CORONARY ARTERY BYPASS GRAFT MARKERS, STAINLESS STEEL, DISTAL

## (undated) DEVICE — CANN ART DLP STR/TIP 18F7IN

## (undated) DEVICE — SYS PERFUS SEP PLATLT W TIPS CUST

## (undated) DEVICE — SOL IRR H2O BTL 1000ML STRL

## (undated) DEVICE — 3M™ MEDIPORE™ H SOFT CLOTH SURGICAL TAPE 2862, 2 INCH X 10 YARD (5CM X 9,1M), 12 ROLLS/CASE: Brand: 3M™ MEDIPORE™

## (undated) DEVICE — CONN JET HYDRA H20 AUXILIARY DISP

## (undated) DEVICE — OASIS DRAIN, SINGLE, INLINE & ATS COMPATIBLE: Brand: OASIS

## (undated) DEVICE — SOL IRRG H2O PL/BG 1000ML STRL

## (undated) DEVICE — SOL IRR NACL 0.9PCT BT 1000ML

## (undated) DEVICE — PK PERFUS CUST W/CARDIOPLEGIA

## (undated) DEVICE — THE SINGLE USE ETRAP – POLYP TRAP IS USED FOR SUCTION RETRIEVAL OF ENDOSCOPICALLY REMOVED POLYPS.: Brand: ETRAP

## (undated) DEVICE — INSUFFLATION TUBING,LAPAROSCOPIC: Brand: DEROYAL

## (undated) DEVICE — SUT VIC 2 TP 1MS/4 27IN DYED J649G

## (undated) DEVICE — ROTATING SURGICAL PUNCHES, 1 PER POUCH: Brand: A&E MEDICAL / ROTATING SURGICAL PUNCHES

## (undated) DEVICE — OPTIFOAM GENTLE SA, POSTOP, 4X12: Brand: MEDLINE

## (undated) DEVICE — IRRIGATOR BULB ASEPTO 60CC STRL

## (undated) DEVICE — GLV SURG BIOGEL LTX PF 6 1/2

## (undated) DEVICE — PAD GRND REM POLYHESIVE A/ DISP

## (undated) DEVICE — 28 FR STRAIGHT – SOFT PVC CATHETER: Brand: PVC THORACIC CATHETERS

## (undated) DEVICE — DRSNG WND GEL FIBR OPTICELL AG PLS W/SLV LF 4X5IN  STRL

## (undated) DEVICE — CATHETER,URETHRAL,REDRUBBER,STERILE,20FR: Brand: MEDLINE

## (undated) DEVICE — 12 FOOT DISPOSABLE EXTENSION CABLE WITH SAFE CONNECT / SCREW-DOWN

## (undated) DEVICE — Device: Brand: DEFENDO AIR/WATER/SUCTION AND BIOPSY VALVE

## (undated) DEVICE — SUT PROLN MO.5 7/0 DBLARM BV175 6 2X30 BX/12

## (undated) DEVICE — VASOVIEW HEMOPRO: Brand: VASOVIEW HEMOPRO

## (undated) DEVICE — GLV SURG BIOGEL M LTX PF 7 1/2

## (undated) DEVICE — HARMONIC SYNERGY DISSECTING HOOK WITH TORQUE WRENCH. FOR USE WITH BLUE HAND PIECE ONLY: Brand: HARMONIC SYNERGY

## (undated) DEVICE — CLAMP INSERT: Brand: STEALTH® CLAMP INSERT

## (undated) DEVICE — SENSR CERBRL O2 PK/2